# Patient Record
Sex: FEMALE | Race: WHITE | NOT HISPANIC OR LATINO | Employment: UNEMPLOYED | URBAN - NONMETROPOLITAN AREA
[De-identification: names, ages, dates, MRNs, and addresses within clinical notes are randomized per-mention and may not be internally consistent; named-entity substitution may affect disease eponyms.]

---

## 2017-01-04 ENCOUNTER — OFFICE VISIT (OUTPATIENT)
Dept: PODIATRY | Facility: CLINIC | Age: 32
End: 2017-01-04

## 2017-01-04 VITALS — HEIGHT: 63 IN | BODY MASS INDEX: 40.57 KG/M2 | WEIGHT: 229 LBS

## 2017-01-04 DIAGNOSIS — M76.61 ACHILLES TENDINITIS OF BOTH LOWER EXTREMITIES: ICD-10-CM

## 2017-01-04 DIAGNOSIS — G57.51 TARSAL TUNNEL SYNDROME, RIGHT LOWER LIMB: Primary | ICD-10-CM

## 2017-01-04 DIAGNOSIS — M76.62 ACHILLES TENDINITIS OF BOTH LOWER EXTREMITIES: ICD-10-CM

## 2017-01-04 PROCEDURE — 99213 OFFICE O/P EST LOW 20 MIN: CPT | Performed by: PODIATRIST

## 2017-01-04 NOTE — PROGRESS NOTES
Veronica Hicks  1985  31 y.o. female     Patient is here for a recheck of her right achilles tendinitis.      1/4/2017  Chief Complaint   Patient presents with   • Right Foot - Follow-up, achilles tendinitis           History of Present Illness    Patient returns to clinic for follow-up of her bilateral Achilles tendinitis.  She has been ambulating in a tall cam walker. States that she recently had a fall and now has to see an orthopedic surgeon for her shoulder.  Relates that the pain is better today.  However, she states that she has had transient numbness and swelling with Ecchymosis in her right foot and ankle.        Past Medical History   Diagnosis Date   • Abdominal pain    • Acute bronchitis    • Acute left otitis media    • Ankle pain    • Asthma    • Attention deficit hyperactivity disorder    • Bipolar disorder    • Candidiasis    • Candidiasis of vagina    • Diarrhea    • Dizziness    • Dysuria    • Elbow joint pain      elbow joint - painful on movement   • Elbow joint pain    • Elevated blood pressure    • Encounter for long-term (current) use of medications      Long-term (current) use of other medications    • Epigastric pain    • Excessive thirst    • Fall    • Fatigue    • Feeling tired      tired all the time   • Flank pain    • Gastroesophageal reflux disease    • High risk sexual behavior    • History of malignant neoplasm of cervix    • Hordeolum internum right upper eyelid    • Hypokalemia    • Increased frequency of urination    • Infection of skin and subcutaneous tissue    • Irritable bowel syndrome    • Knee pain, left    • Low back pain    • Muscle weakness    • Nausea and vomiting    • Pain in limb    • Pain in wrist    • Postartificial menopausal syndrome    • Postmenopausal state    • Serous otitis media    • Skin lesion    • Smokes tobacco daily    • Upper respiratory infection          Past Surgical History   Procedure Laterality Date   • Appendectomy     • Injection of  medication  04/17/2015     depo medrol 80mg   • Leg surgery     • Oophorectomy  07/22/2015     bilatral, for pain and recurrent ovarian cysts   • Pap smear  09/2012   • Total abdominal hysterectomy       ovaries retained   • Tubal abdominal ligation     • Orif ulna/radius fractures       treat radius/ulna fracutre-2; no pins, fracture L fa         Family History   Problem Relation Age of Onset   • ADD / ADHD Daughter    • Lung cancer Maternal Grandmother    • Cancer Paternal Grandmother    • Diabetes Other    • Liver cancer Other    • Breast cancer Neg Hx    • Colon cancer Neg Hx    • Endometrial cancer Neg Hx    • Ovarian cancer Neg Hx          Social History     Social History   • Marital status: Single     Spouse name: N/A   • Number of children: N/A   • Years of education: N/A     Occupational History   • disabled      Social History Main Topics   • Smoking status: Former Smoker     Types: Electronic Cigarette   • Smokeless tobacco: Never Used      Comment: Pt states she vapes   • Alcohol use No   • Drug use: No   • Sexual activity: Not on file     Other Topics Concern   • Not on file     Social History Narrative         Current Outpatient Prescriptions   Medication Sig Dispense Refill   • amitriptyline (ELAVIL) 25 MG tablet Take one tablet by mouth every other night as needed for sleep. 15 tablet 2   • Ergocalciferol 400 UNITS tablet Take 400 Int'l Units by mouth daily. 30 tablet 2   • glucose blood test strip To test blood sugars once daily 50 each 12   • glucose monitor monitoring kit To test blood sugars once daily 1 each 0   • MethylPREDNISolone (MEDROL) 4 MG tablet Take  by mouth. Take as directed on package instructions.     • MethylPREDNISolone (MEDROL, SARITA,) 4 MG tablet Take as directed on package instructions. 21 tablet 0   • naproxen sodium (ANAPROX) 275 MG tablet Take one tablet by mouth twice daily with food as needed for pain. 60 tablet 0   • neomycin-polymyxin-hydrocortisone (CORTISPORIN)  "3.5-95718-9 ophthalmic suspension Administer 1-2 drops to the right eye Every 4 (Four) Hours While Awake.     • Omega-3 Fatty Acids (FISH OIL) 1000 MG capsule capsule Take 1 capsule by mouth daily with breakfast. 30 capsule 0   • simvastatin (ZOCOR) 20 MG tablet Take 1 tablet by mouth every night. 30 tablet 5   • venlafaxine XR (EFFEXOR XR) 37.5 MG 24 hr capsule Take one capsule by mouth daily for one week and then two capsules by mouth daily. 50 capsule 0   • venlafaxine XR (EFFEXOR-XR) 75 MG 24 hr capsule Take 75 mg by mouth Daily.  0   • clindamycin (CLEOCIN) 300 MG capsule Take 300 mg by mouth 2 (Two) Times a Day.       No current facility-administered medications for this visit.          OBJECTIVE    Visit Vitals   • Ht 62.5\" (158.8 cm)   • Wt 229 lb (104 kg)   • BMI 41.22 kg/m2     Review of Systems   Constitutional: Negative for chills and fever.   Cardiovascular: Negative for chest pain.   Gastrointestinal: Negative for constipation, diarrhea, nausea and vomiting.   Skin: Negative for wound.   Musculoskeletal: bilateral ankle pain      Constitutional: well developed, well nourished    Respiratory: Non labored respirations noted    Cardiovascular:    DP/PT pulses palpable    CFT brisk  to all digits  Skin temp is warm to warm from proximal tibia to distal digits  Pedal hair growth present.   No erythema   Edema noted to bilateral lower extremities.    Musculoskeletal:  Muscle strength is 5/5 for all muscle groups tested   ROM of the 1st MTP is full without pain or crepitus  ROM of the MTJ is full without pain or crepitus    ROM of the STJ is full without pain or crepitus    ROM of the ankle joint is limited with  Pain without crepitus    Pain on palpation to the distal Achilles tendon insertion bilateral.  Rectus foot type     Dermatological:   Nails 1-5 are within normal limits for length and thickness    Skin is warm, dry and intact    Webspaces 1-4 bilateral are clean, dry and intact.   No subcutaneous " nodules or masses noted    No open wounds noted     Neurological:   Protective sensation intact    Sensation intact to light touch  Positive Tinel sign with percussion of the tibial nerve of the right lower extremity.    DTR intact    Psychiatric: A&O x 3 with normal mood and affect. NAD              Procedures        ASSESSMENT AND PLAN    Veronica was seen today for follow-up and achilles tendinitis.    Diagnoses and all orders for this visit:    Tarsal tunnel syndrome, right lower limb  -     MRI ankle right w wo contrast; Future    Achilles tendinitis of both lower extremities        Ordered MRI to evaluate for mass in the tarsal tunnel  Continue weightbearing as tolerated in the Cam Walker.  Patient is in agreement with the current treatment plan. All her questions were answered to her satisfaction.  Return to clinic after MRI.    Jordan Houston DPM  1/5/2017  4:02 PM            This document has been electronically signed by Jordan Houston DPM on January 5, 2017 4:02 PM

## 2017-01-04 NOTE — MR AVS SNAPSHOT
Veronica Hicks   1/4/2017 2:00 PM   Office Visit    Dept Phone:  [phone]   Encounter #:  [id number]    Provider:  Jordan Houston DPM   Department:  [facility] PODIATRY                Your Full Care Plan              Your Updated Medication List          This list is accurate as of: 1/4/17  2:24 PM.  Always use your most recent med list.                amitriptyline 25 MG tablet   Commonly known as:  ELAVIL   Take one tablet by mouth every other night as needed for sleep.       clindamycin 300 MG capsule   Commonly known as:  CLEOCIN       Ergocalciferol 400 UNITS tablet   Take 400 Int'l Units by mouth daily.       fish oil 1000 MG capsule capsule   Take 1 capsule by mouth daily with breakfast.       glucose blood test strip   To test blood sugars once daily       glucose monitor monitoring kit   To test blood sugars once daily       * MethylPREDNISolone 4 MG tablet   Commonly known as:  MEDROL (SARITA)   Take as directed on package instructions.       * MEDROL 4 MG tablet   Generic drug:  MethylPREDNISolone       naproxen sodium 275 MG tablet   Commonly known as:  ANAPROX   Take one tablet by mouth twice daily with food as needed for pain.       neomycin-polymyxin-hydrocortisone 3.5-87075-4 ophthalmic suspension   Commonly known as:  CORTISPORIN       simvastatin 20 MG tablet   Commonly known as:  ZOCOR   Take 1 tablet by mouth every night.       * venlafaxine XR 37.5 MG 24 hr capsule   Commonly known as:  EFFEXOR XR   Take one capsule by mouth daily for one week and then two capsules by mouth daily.       * venlafaxine XR 75 MG 24 hr capsule   Commonly known as:  EFFEXOR-XR       * Notice:  This list has 4 medication(s) that are the same as other medications prescribed for you. Read the directions carefully, and ask your doctor or other care provider to review them with you.            You Were Diagnosed With        Codes Comments    Tarsal tunnel syndrome, right lower  "limb    -  Primary ICD-10-CM: G57.51  ICD-9-CM: 355.5       Instructions     None    Patient Instructions History      Upcoming Appointments     Visit Type Date Time Department    FOLLOW UP 1/4/2017  2:00 PM [facility]    FOLLOW UP 1/16/2017  4:00 PM [facility]      MyChart Signup     Our records indicate that you have declined [facility]. If you would like to sign up for [employer], please email [email or web address] or call [phone] to obtain an activation code.             Other Info from Your Visit           Your Appointments     Jan 16, 2017  4:00 PM CST   Follow Up with Jordan Houston DPM   [facility] PODIATRY (--)    [address]  [facility] [place] [address] [place] [id number] [phone]           Arrive 15 minutes prior to appointment.              Allergies     Bactrim [Sulfamethoxazole-trimethoprim]      Codeine      Erythromycin      Gabapentin      Keflex [Cephalexin]      Penicillins      Zoloft [Sertraline Hcl]        Reason for Visit     Right Foot - Follow-up, achilles tendinitis           Vital Signs     Height Weight Body Mass Index Smoking Status          62.5\" (158.8 cm) 229 lb (104 kg) 41.22 kg/m2 Former Smoker        Problems and Diagnoses Noted     Tarsal tunnel syndrome    -  Primary        "

## 2017-02-03 ENCOUNTER — OFFICE VISIT (OUTPATIENT)
Dept: OBSTETRICS AND GYNECOLOGY | Facility: CLINIC | Age: 32
End: 2017-02-03

## 2017-02-03 VITALS
RESPIRATION RATE: 18 BRPM | DIASTOLIC BLOOD PRESSURE: 90 MMHG | WEIGHT: 228.2 LBS | HEART RATE: 87 BPM | SYSTOLIC BLOOD PRESSURE: 130 MMHG | HEIGHT: 63 IN | BODY MASS INDEX: 40.43 KG/M2

## 2017-02-03 DIAGNOSIS — Z11.3 ENCOUNTER FOR SPECIAL SCREENING EXAMINATION FOR INFECTION WITH PREDOMINANTLY SEXUAL MODE OF TRANSMISSION: ICD-10-CM

## 2017-02-03 DIAGNOSIS — A59.01 TRICHOMONAL VAGINITIS: Primary | ICD-10-CM

## 2017-02-03 PROCEDURE — 87210 SMEAR WET MOUNT SALINE/INK: CPT | Performed by: NURSE PRACTITIONER

## 2017-02-03 PROCEDURE — 99213 OFFICE O/P EST LOW 20 MIN: CPT | Performed by: NURSE PRACTITIONER

## 2017-02-03 PROCEDURE — 87800 DETECT AGNT MULT DNA DIREC: CPT | Performed by: NURSE PRACTITIONER

## 2017-02-03 RX ORDER — LANCETS 33 GAUGE
EACH MISCELLANEOUS
Refills: 12 | COMMUNITY
Start: 2016-11-15 | End: 2017-11-02 | Stop reason: SDUPTHER

## 2017-02-03 RX ORDER — BLOOD-GLUCOSE METER
KIT MISCELLANEOUS
Refills: 0 | COMMUNITY
Start: 2016-11-15

## 2017-02-03 RX ORDER — METRONIDAZOLE 500 MG/1
500 TABLET ORAL 2 TIMES DAILY
Qty: 14 TABLET | Refills: 0 | Status: SHIPPED | OUTPATIENT
Start: 2017-02-03 | End: 2017-02-10

## 2017-02-03 NOTE — PROGRESS NOTES
"Subjective   Veronica Hicks is a 31 y.o. female.     History of Present Illness   Pt presents complaining of vaginal itching, pain, swelling and large amount of discharge with foul odor. States symptoms started approximately 2 weeks ago. Denies intercourse in 3 weeks. Agrees to G/C testing. States she has only had 1 partner in 5 years but showed up positive for STD from a toilet seat before.     Pt has had a complete hysterectomy after abnormal pap smear and cervical cancer. States she went through menopause before her 20's. Had tubal first to \"keep her children from getting HPV.\"     The following portions of the patient's history were reviewed and updated as appropriate: allergies, current medications, past family history, past medical history, past social history, past surgical history and problem list.    Review of Systems   Constitutional: Negative.    Respiratory: Negative.    Cardiovascular: Negative.    Genitourinary: Positive for dysuria, pelvic pain, vaginal discharge and vaginal pain. Negative for genital sores and vaginal bleeding.   Psychiatric/Behavioral:        Bipolar disorder, very anxious about exam       Objective   Physical Exam   Constitutional: She is oriented to person, place, and time. She appears well-developed and well-nourished.   Cardiovascular: Normal rate, regular rhythm and normal heart sounds.    Pulmonary/Chest: Effort normal and breath sounds normal.   Genitourinary: There is tenderness on the right labia. There is no rash, lesion or injury on the right labia. There is tenderness on the left labia. There is no rash, lesion or injury on the left labia. There is erythema and tenderness in the vagina. No bleeding in the vagina. No foreign body in the vagina. No signs of injury around the vagina. Vaginal discharge (copious, foul smelling yellow green frothy discharge, wet prep obtained G/C swab obtained) found.   Genitourinary Comments: Erythema of the entire vulva. S/P hysterectomy " w/BSO. Wet prep results: positive for trichomonads TNTC, WBC TNTC, some clue cells, negative for yeast buds or hyphae. Evaluated by DC Knutson    Neurological: She is alert and oriented to person, place, and time.   Psychiatric: Her mood appears anxious. Her speech is rapid and/or pressured. She is hyperactive.   Vitals reviewed.      Assessment/Plan   Veronica was seen today for vaginitis.    Diagnoses and all orders for this visit:    Trichomonal vaginitis  -     metroNIDAZOLE (FLAGYL) 500 MG tablet; Take 1 tablet by mouth 2 (Two) Times a Day for 7 days. No alcohol up to 48 hours after last dose    Encounter for special screening examination for infection with predominantly sexual mode of transmission  -     C Trachomatis / N Gonorrhoeae PCR         I discussed findings of trichomoniasis with pt x 10 minutes. Pt is insistent that she did not get this infection through sexual contact as she states neither she nor her partner have been with anyone else in many years. She is persistent that she got this from a toilet seat despite my educational information. I printed patient info from [employer] for pt. She told partner while in my office via text message and he was going to go to Urgent Care to receive treatment. I will call with G/C results and prescribe prn.

## 2017-02-05 LAB
C TRACH RRNA CVX QL NAA+PROBE: NOT DETECTED
N GONORRHOEA RRNA SPEC QL NAA+PROBE: NOT DETECTED

## 2017-05-11 ENCOUNTER — OFFICE VISIT (OUTPATIENT)
Dept: ORTHOPEDIC SURGERY | Facility: CLINIC | Age: 32
End: 2017-05-11

## 2017-05-11 VITALS — WEIGHT: 225 LBS | HEIGHT: 62 IN | BODY MASS INDEX: 41.41 KG/M2

## 2017-05-11 DIAGNOSIS — G89.29 CHRONIC PAIN OF BOTH KNEES: Primary | ICD-10-CM

## 2017-05-11 DIAGNOSIS — Z00.00 EVALUATION BY MEDICAL SERVICE REQUIRED: ICD-10-CM

## 2017-05-11 DIAGNOSIS — M25.561 CHRONIC PAIN OF BOTH KNEES: Primary | ICD-10-CM

## 2017-05-11 DIAGNOSIS — M25.562 CHRONIC PAIN OF BOTH KNEES: Primary | ICD-10-CM

## 2017-05-11 DIAGNOSIS — M22.2X2 PATELLOFEMORAL STRESS SYNDROME OF BOTH KNEES: ICD-10-CM

## 2017-05-11 DIAGNOSIS — M22.2X1 PATELLOFEMORAL STRESS SYNDROME OF BOTH KNEES: ICD-10-CM

## 2017-05-11 PROCEDURE — 99214 OFFICE O/P EST MOD 30 MIN: CPT | Performed by: ORTHOPAEDIC SURGERY

## 2017-05-18 DIAGNOSIS — Z00.00 EXAMINATION, MEDICAL, GENERAL: Primary | ICD-10-CM

## 2017-07-05 PROBLEM — M25.571 ACUTE RIGHT ANKLE PAIN: Status: ACTIVE | Noted: 2017-07-05

## 2017-10-02 PROBLEM — M79.671 PAIN OF RIGHT HEEL: Status: ACTIVE | Noted: 2017-10-02

## 2017-10-02 PROBLEM — M79.10 MUSCLE SORENESS: Status: ACTIVE | Noted: 2017-10-02

## 2017-10-02 PROBLEM — M54.50 LOW BACK PAIN: Status: ACTIVE | Noted: 2017-10-02

## 2017-10-02 PROBLEM — S29.011A CHEST WALL MUSCLE STRAIN: Status: ACTIVE | Noted: 2017-10-02

## 2017-11-02 ENCOUNTER — OFFICE VISIT (OUTPATIENT)
Dept: FAMILY MEDICINE CLINIC | Facility: CLINIC | Age: 32
End: 2017-11-02

## 2017-11-02 VITALS
WEIGHT: 236 LBS | BODY MASS INDEX: 43.43 KG/M2 | OXYGEN SATURATION: 99 % | HEART RATE: 72 BPM | DIASTOLIC BLOOD PRESSURE: 74 MMHG | TEMPERATURE: 98.9 F | HEIGHT: 62 IN | SYSTOLIC BLOOD PRESSURE: 110 MMHG | RESPIRATION RATE: 18 BRPM

## 2017-11-02 DIAGNOSIS — R07.9 CHEST PAIN, UNSPECIFIED TYPE: ICD-10-CM

## 2017-11-02 DIAGNOSIS — Z09 HOSPITAL DISCHARGE FOLLOW-UP: Primary | ICD-10-CM

## 2017-11-02 DIAGNOSIS — J45.40 MODERATE PERSISTENT ASTHMA, UNSPECIFIED WHETHER COMPLICATED: ICD-10-CM

## 2017-11-02 DIAGNOSIS — R50.9 FEVER, UNSPECIFIED FEVER CAUSE: ICD-10-CM

## 2017-11-02 DIAGNOSIS — J40 BRONCHITIS: ICD-10-CM

## 2017-11-02 DIAGNOSIS — R09.1 PLEURISY: ICD-10-CM

## 2017-11-02 DIAGNOSIS — I73.9 INTERMITTENT CLAUDICATION (HCC): ICD-10-CM

## 2017-11-02 DIAGNOSIS — T36.95XA ANTIBIOTIC-INDUCED YEAST INFECTION: ICD-10-CM

## 2017-11-02 DIAGNOSIS — IMO0002 UNCONTROLLED TYPE 2 DIABETES MELLITUS WITH COMPLICATION, WITHOUT LONG-TERM CURRENT USE OF INSULIN: ICD-10-CM

## 2017-11-02 DIAGNOSIS — B37.9 ANTIBIOTIC-INDUCED YEAST INFECTION: ICD-10-CM

## 2017-11-02 PROCEDURE — 99214 OFFICE O/P EST MOD 30 MIN: CPT | Performed by: NURSE PRACTITIONER

## 2017-11-02 PROCEDURE — 96372 THER/PROPH/DIAG INJ SC/IM: CPT | Performed by: NURSE PRACTITIONER

## 2017-11-02 RX ORDER — FLUOXETINE 10 MG/1
CAPSULE ORAL
COMMUNITY
Start: 2017-10-19 | End: 2017-11-02 | Stop reason: SDUPTHER

## 2017-11-02 RX ORDER — FLUOXETINE 10 MG/1
TABLET, FILM COATED ORAL
COMMUNITY
End: 2017-11-17 | Stop reason: ALTCHOICE

## 2017-11-02 RX ORDER — BETAMETHASONE SODIUM PHOSPHATE AND BETAMETHASONE ACETATE 3; 3 MG/ML; MG/ML
6 INJECTION, SUSPENSION INTRA-ARTICULAR; INTRALESIONAL; INTRAMUSCULAR; SOFT TISSUE ONCE
Status: COMPLETED | OUTPATIENT
Start: 2017-11-02 | End: 2017-11-02

## 2017-11-02 RX ORDER — LEVOFLOXACIN 500 MG/1
500 TABLET, FILM COATED ORAL DAILY
Qty: 7 TABLET | Refills: 0 | Status: SHIPPED | OUTPATIENT
Start: 2017-11-02 | End: 2017-11-14

## 2017-11-02 RX ORDER — TRAZODONE HYDROCHLORIDE 50 MG/1
TABLET ORAL
COMMUNITY
End: 2018-01-03 | Stop reason: ALTCHOICE

## 2017-11-02 RX ORDER — LANCETS 33 GAUGE
1 EACH MISCELLANEOUS DAILY
Qty: 50 EACH | Refills: 11 | Status: SHIPPED | OUTPATIENT
Start: 2017-11-02 | End: 2019-03-18

## 2017-11-02 RX ORDER — ALBUTEROL SULFATE 90 UG/1
2 AEROSOL, METERED RESPIRATORY (INHALATION) EVERY 6 HOURS PRN
Qty: 1 INHALER | Refills: 3 | Status: SHIPPED | OUTPATIENT
Start: 2017-11-02 | End: 2018-08-28 | Stop reason: SDUPTHER

## 2017-11-02 RX ORDER — METHYLPREDNISOLONE 4 MG/1
TABLET ORAL
COMMUNITY
Start: 2017-10-31 | End: 2017-11-02

## 2017-11-02 RX ORDER — NAPROXEN 500 MG/1
TABLET ORAL
COMMUNITY
Start: 2017-10-31 | End: 2017-11-14

## 2017-11-02 RX ORDER — ALBUTEROL SULFATE 90 UG/1
AEROSOL, METERED RESPIRATORY (INHALATION)
COMMUNITY
End: 2017-11-02

## 2017-11-02 RX ORDER — BLOOD-GLUCOSE METER
KIT MISCELLANEOUS
Qty: 1 EACH | Refills: 0 | Status: SHIPPED | OUTPATIENT
Start: 2017-11-02 | End: 2019-09-16

## 2017-11-02 RX ORDER — FLUCONAZOLE 150 MG/1
TABLET ORAL
Qty: 2 TABLET | Refills: 0 | Status: SHIPPED | OUTPATIENT
Start: 2017-11-02 | End: 2017-11-14

## 2017-11-02 RX ADMIN — BETAMETHASONE SODIUM PHOSPHATE AND BETAMETHASONE ACETATE 6 MG: 3; 3 INJECTION, SUSPENSION INTRA-ARTICULAR; INTRALESIONAL; INTRAMUSCULAR; SOFT TISSUE at 11:19
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
## 2017-11-14 ENCOUNTER — OFFICE VISIT (OUTPATIENT)
Dept: GASTROENTEROLOGY | Facility: CLINIC | Age: 32
End: 2017-11-14

## 2017-11-14 VITALS
DIASTOLIC BLOOD PRESSURE: 72 MMHG | WEIGHT: 226 LBS | BODY MASS INDEX: 41.59 KG/M2 | HEIGHT: 62 IN | SYSTOLIC BLOOD PRESSURE: 118 MMHG | HEART RATE: 97 BPM

## 2017-11-14 DIAGNOSIS — K21.00 GASTROESOPHAGEAL REFLUX DISEASE WITH ESOPHAGITIS: Primary | ICD-10-CM

## 2017-11-14 DIAGNOSIS — R11.2 NON-INTRACTABLE VOMITING WITH NAUSEA, UNSPECIFIED VOMITING TYPE: ICD-10-CM

## 2017-11-14 DIAGNOSIS — R19.7 DIARRHEA, UNSPECIFIED TYPE: ICD-10-CM

## 2017-11-14 DIAGNOSIS — R10.84 GENERALIZED ABDOMINAL PAIN: ICD-10-CM

## 2017-11-14 LAB — ERYTHROCYTE [SEDIMENTATION RATE] IN BLOOD: 7 MM/HR (ref 0–20)

## 2017-11-14 PROCEDURE — 83516 IMMUNOASSAY NONANTIBODY: CPT | Performed by: PHYSICIAN ASSISTANT

## 2017-11-14 PROCEDURE — 86140 C-REACTIVE PROTEIN: CPT | Performed by: PHYSICIAN ASSISTANT

## 2017-11-14 PROCEDURE — 86003 ALLG SPEC IGE CRUDE XTRC EA: CPT | Performed by: PHYSICIAN ASSISTANT

## 2017-11-14 PROCEDURE — 99214 OFFICE O/P EST MOD 30 MIN: CPT | Performed by: PHYSICIAN ASSISTANT

## 2017-11-14 PROCEDURE — 86256 FLUORESCENT ANTIBODY TITER: CPT | Performed by: PHYSICIAN ASSISTANT

## 2017-11-14 PROCEDURE — 85651 RBC SED RATE NONAUTOMATED: CPT | Performed by: PHYSICIAN ASSISTANT

## 2017-11-14 RX ORDER — POLYETHYLENE GLYCOL 3350, SODIUM CHLORIDE, SODIUM BICARBONATE, POTASSIUM CHLORIDE 420; 11.2; 5.72; 1.48 G/4L; G/4L; G/4L; G/4L
4000 POWDER, FOR SOLUTION ORAL ONCE
Qty: 4000 ML | Refills: 0 | Status: SHIPPED | OUTPATIENT
Start: 2017-11-14 | End: 2017-11-14 | Stop reason: SDDI

## 2017-11-14 RX ORDER — DEXTROSE AND SODIUM CHLORIDE 5; .45 G/100ML; G/100ML
30 INJECTION, SOLUTION INTRAVENOUS CONTINUOUS PRN
Status: CANCELLED | OUTPATIENT
Start: 2018-01-10

## 2017-11-14 RX ORDER — POLYETHYLENE GLYCOL 3350 17 G/17G
POWDER, FOR SOLUTION ORAL
Qty: 255 G | Refills: 0 | Status: SHIPPED | OUTPATIENT
Start: 2017-11-14 | End: 2018-01-17

## 2017-11-15 ENCOUNTER — TELEPHONE (OUTPATIENT)
Dept: FAMILY MEDICINE CLINIC | Facility: CLINIC | Age: 32
End: 2017-11-15

## 2017-11-15 DIAGNOSIS — Z13.220 SCREENING FOR HYPERLIPIDEMIA: ICD-10-CM

## 2017-11-15 DIAGNOSIS — Z13.29 SCREENING FOR THYROID DISORDER: ICD-10-CM

## 2017-11-15 DIAGNOSIS — Z13.1 SCREENING FOR DIABETES MELLITUS: ICD-10-CM

## 2017-11-15 DIAGNOSIS — Z13.0 SCREENING FOR DEFICIENCY ANEMIA: ICD-10-CM

## 2017-11-15 DIAGNOSIS — R79.1 PROLONGED BLEEDING TIME: Primary | ICD-10-CM

## 2017-11-15 LAB — CRP SERPL-MCNC: 0.6 MG/DL (ref 0–1)

## 2017-11-15 NOTE — TELEPHONE ENCOUNTER
Just left message for pt to call back need her to fast and get bloodwork done before appt on Friday

## 2017-11-15 NOTE — TELEPHONE ENCOUNTER
Pt called back and spoke about her coming in for blood work needs to be fasting and she was talking about a lot of other things as well.    She was talking about her blood being thin and when she checks her blood sugar it bleeds for over a minute and soaks 2 kleenex so I will ask Valentina to add something to test that pt states that her blood is so thin she cant be on blood thinners.

## 2017-11-16 LAB
ALBUMIN SERPL-MCNC: 4.3 G/DL (ref 3.2–5.5)
ALBUMIN/GLOB SERPL: 1.2 G/DL (ref 1–3)
ALP SERPL-CCNC: 70 U/L (ref 15–121)
ALT SERPL W P-5'-P-CCNC: 30 U/L (ref 10–60)
ANION GAP SERPL CALCULATED.3IONS-SCNC: 11 MMOL/L (ref 5–15)
AST SERPL-CCNC: 22 U/L (ref 10–60)
BASOPHILS # BLD AUTO: 0.03 10*3/MM3 (ref 0–0.2)
BASOPHILS NFR BLD AUTO: 0.3 % (ref 0–2)
BILIRUB SERPL-MCNC: 0.8 MG/DL (ref 0.2–1)
BUN BLD-MCNC: 14 MG/DL (ref 8–25)
BUN/CREAT SERPL: 15.6 (ref 7–25)
CALCIUM SPEC-SCNC: 9.6 MG/DL (ref 8.4–10.8)
CHLORIDE SERPL-SCNC: 103 MMOL/L (ref 100–112)
CHOLEST SERPL-MCNC: 211 MG/DL (ref 150–200)
CO2 SERPL-SCNC: 27 MMOL/L (ref 20–32)
CREAT BLD-MCNC: 0.9 MG/DL (ref 0.4–1.3)
DEPRECATED RDW RBC AUTO: 42.9 FL (ref 36.4–46.3)
EOSINOPHIL # BLD AUTO: 0.24 10*3/MM3 (ref 0–0.7)
EOSINOPHIL NFR BLD AUTO: 2.3 % (ref 0–7)
ERYTHROCYTE [DISTWIDTH] IN BLOOD BY AUTOMATED COUNT: 14.4 % (ref 11.5–14.5)
GFR SERPL CREATININE-BSD FRML MDRD: 74 ML/MIN/1.73 (ref 71–165)
GLIADIN PEPTIDE IGA SER-ACNC: 2 UNITS (ref 0–19)
GLIADIN PEPTIDE IGG SER-ACNC: 2 UNITS (ref 0–19)
GLOBULIN UR ELPH-MCNC: 3.5 GM/DL (ref 2.5–4.6)
GLUCOSE BLD-MCNC: 89 MG/DL (ref 70–100)
HCT VFR BLD AUTO: 46.2 % (ref 35–45)
HDLC SERPL-MCNC: 52 MG/DL (ref 35–100)
HGB BLD-MCNC: 14.7 G/DL (ref 12–15.5)
INR PPP: 1 (ref 0.8–1.2)
LDLC SERPL CALC-MCNC: 134 MG/DL
LDLC/HDLC SERPL: 2.58 {RATIO}
LYMPHOCYTES # BLD AUTO: 2.39 10*3/MM3 (ref 0.6–4.2)
LYMPHOCYTES NFR BLD AUTO: 22.8 % (ref 10–50)
MCH RBC QN AUTO: 26.2 PG (ref 26.5–34)
MCHC RBC AUTO-ENTMCNC: 31.8 G/DL (ref 31.4–36)
MCV RBC AUTO: 82.2 FL (ref 80–98)
MONOCYTES # BLD AUTO: 0.53 10*3/MM3 (ref 0–0.9)
MONOCYTES NFR BLD AUTO: 5.1 % (ref 0–12)
NEUTROPHILS # BLD AUTO: 7.27 10*3/MM3 (ref 2–8.6)
NEUTROPHILS NFR BLD AUTO: 69.5 % (ref 37–80)
PLATELET # BLD AUTO: 266 10*3/MM3 (ref 150–450)
PMV BLD AUTO: 9 FL (ref 8–12)
POTASSIUM BLD-SCNC: 4.2 MMOL/L (ref 3.4–5.4)
PROT SERPL-MCNC: 7.8 G/DL (ref 6.7–8.2)
PROTHROMBIN TIME: 13.1 SECONDS (ref 11.1–15.3)
RBC # BLD AUTO: 5.62 10*6/MM3 (ref 3.77–5.16)
SODIUM BLD-SCNC: 141 MMOL/L (ref 134–146)
TRIGL SERPL-MCNC: 125 MG/DL (ref 35–160)
TTG IGA SER-ACNC: <2 U/ML (ref 0–3)
TTG IGG SER-ACNC: <2 U/ML (ref 0–5)
VLDLC SERPL-MCNC: 25 MG/DL
WBC NRBC COR # BLD: 10.46 10*3/MM3 (ref 3.2–9.8)

## 2017-11-16 PROCEDURE — 85610 PROTHROMBIN TIME: CPT | Performed by: NURSE PRACTITIONER

## 2017-11-16 PROCEDURE — 80053 COMPREHEN METABOLIC PANEL: CPT | Performed by: NURSE PRACTITIONER

## 2017-11-16 PROCEDURE — 84439 ASSAY OF FREE THYROXINE: CPT | Performed by: NURSE PRACTITIONER

## 2017-11-16 PROCEDURE — 85025 COMPLETE CBC W/AUTO DIFF WBC: CPT | Performed by: NURSE PRACTITIONER

## 2017-11-16 PROCEDURE — 80061 LIPID PANEL: CPT | Performed by: NURSE PRACTITIONER

## 2017-11-16 PROCEDURE — 84443 ASSAY THYROID STIM HORMONE: CPT | Performed by: NURSE PRACTITIONER

## 2017-11-17 ENCOUNTER — OFFICE VISIT (OUTPATIENT)
Dept: FAMILY MEDICINE CLINIC | Facility: CLINIC | Age: 32
End: 2017-11-17

## 2017-11-17 VITALS
HEIGHT: 63 IN | BODY MASS INDEX: 40.04 KG/M2 | HEART RATE: 102 BPM | SYSTOLIC BLOOD PRESSURE: 126 MMHG | OXYGEN SATURATION: 99 % | DIASTOLIC BLOOD PRESSURE: 82 MMHG | RESPIRATION RATE: 18 BRPM | WEIGHT: 226 LBS | TEMPERATURE: 99.3 F

## 2017-11-17 DIAGNOSIS — B37.9 ANTIBIOTIC-INDUCED YEAST INFECTION: ICD-10-CM

## 2017-11-17 DIAGNOSIS — G89.29 CHRONIC PAIN OF BOTH KNEES: ICD-10-CM

## 2017-11-17 DIAGNOSIS — M25.561 CHRONIC PAIN OF BOTH KNEES: ICD-10-CM

## 2017-11-17 DIAGNOSIS — J01.01 ACUTE RECURRENT MAXILLARY SINUSITIS: ICD-10-CM

## 2017-11-17 DIAGNOSIS — I73.9 PAD (PERIPHERAL ARTERY DISEASE) (HCC): Primary | ICD-10-CM

## 2017-11-17 DIAGNOSIS — E78.5 HYPERLIPIDEMIA, UNSPECIFIED HYPERLIPIDEMIA TYPE: ICD-10-CM

## 2017-11-17 DIAGNOSIS — F41.9 ANXIETY: ICD-10-CM

## 2017-11-17 DIAGNOSIS — IMO0002 UNCONTROLLED TYPE 2 DIABETES MELLITUS WITH COMPLICATION, WITHOUT LONG-TERM CURRENT USE OF INSULIN: ICD-10-CM

## 2017-11-17 DIAGNOSIS — M79.2 PERIPHERAL NEUROPATHIC PAIN: ICD-10-CM

## 2017-11-17 DIAGNOSIS — M25.562 CHRONIC PAIN OF BOTH KNEES: ICD-10-CM

## 2017-11-17 DIAGNOSIS — T36.95XA ANTIBIOTIC-INDUCED YEAST INFECTION: ICD-10-CM

## 2017-11-17 DIAGNOSIS — G25.81 RESTLESS LEG SYNDROME: ICD-10-CM

## 2017-11-17 DIAGNOSIS — J45.20 MILD INTERMITTENT ASTHMA WITHOUT COMPLICATION: ICD-10-CM

## 2017-11-17 LAB
BAKER'S YEAST IGG QN: <20 UNITS (ref 0–24.9)
P-ANCA ATYPICAL TITR SER IF: NORMAL TITER
SACCHAROMYCES CEREVISIAE AB IGA: <20 UNITS (ref 0–24.9)
T4 FREE SERPL-MCNC: 0.94 NG/DL (ref 0.78–2.19)
TSH SERPL DL<=0.05 MIU/L-ACNC: 1.26 MIU/ML (ref 0.46–4.68)

## 2017-11-17 PROCEDURE — 99214 OFFICE O/P EST MOD 30 MIN: CPT | Performed by: NURSE PRACTITIONER

## 2017-11-17 RX ORDER — DULOXETIN HYDROCHLORIDE 30 MG/1
30 CAPSULE, DELAYED RELEASE ORAL DAILY
Qty: 30 CAPSULE | Refills: 0 | Status: SHIPPED | OUTPATIENT
Start: 2017-11-17 | End: 2017-12-18 | Stop reason: SINTOL

## 2017-11-17 RX ORDER — FLUCONAZOLE 150 MG/1
TABLET ORAL
Qty: 2 TABLET | Refills: 0 | Status: SHIPPED | OUTPATIENT
Start: 2017-11-17 | End: 2018-01-03

## 2017-11-17 RX ORDER — ROPINIROLE 1 MG/1
1 TABLET, FILM COATED ORAL NIGHTLY
Qty: 30 TABLET | Refills: 0 | Status: SHIPPED | OUTPATIENT
Start: 2017-11-17 | End: 2018-01-03 | Stop reason: ALTCHOICE

## 2017-11-17 RX ORDER — LOVASTATIN 10 MG/1
10 TABLET ORAL NIGHTLY
Qty: 30 TABLET | Refills: 0 | Status: SHIPPED | OUTPATIENT
Start: 2017-11-17 | End: 2017-12-18 | Stop reason: SINTOL

## 2017-11-17 RX ORDER — CILOSTAZOL 100 MG/1
100 TABLET ORAL DAILY
Qty: 30 TABLET | Refills: 0 | Status: SHIPPED | OUTPATIENT
Start: 2017-11-17 | End: 2017-12-18 | Stop reason: SDUPTHER

## 2017-11-17 RX ORDER — LEVOFLOXACIN 500 MG/1
500 TABLET, FILM COATED ORAL DAILY
Qty: 5 TABLET | Refills: 0 | Status: SHIPPED | OUTPATIENT
Start: 2017-11-17 | End: 2017-11-22

## 2017-11-17 RX ORDER — ASPIRIN 81 MG/1
81 TABLET ORAL DAILY
Qty: 30 TABLET | Refills: 0 | Status: SHIPPED | OUTPATIENT
Start: 2017-11-17 | End: 2017-12-18 | Stop reason: SDUPTHER

## 2017-11-18 LAB
CALIF WALNUT POLN IGE QN: <0.1 KU/L
CODFISH IGE QN: <0.1 KU/L
CONV CLASS DESCRIPTION: ABNORMAL
COW MILK IGE QN: 0.14 KU/L
EGG WHITE IGE QN: <0.1 KU/L
GLUTEN IGE QN: <0.1 KU/L
HAZELNUT IGE QN: <0.1 KU/L
PEANUT IGE QN: 0.12 KU/L
SCALLOP IGE QN: 0.1 KU/L
SESAME SEED IGE: 0.11 KU/L
SHRIMP IGE: <0.1 KU/L
SOYBEAN IGE QN: <0.1 KU/L
WHEAT IGE QN: <0.1 KU/L
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
## 2017-12-18 ENCOUNTER — OFFICE VISIT (OUTPATIENT)
Dept: FAMILY MEDICINE CLINIC | Facility: CLINIC | Age: 32
End: 2017-12-18

## 2017-12-18 VITALS
SYSTOLIC BLOOD PRESSURE: 110 MMHG | HEIGHT: 63 IN | DIASTOLIC BLOOD PRESSURE: 80 MMHG | BODY MASS INDEX: 40.04 KG/M2 | TEMPERATURE: 98 F | HEART RATE: 93 BPM | WEIGHT: 226 LBS

## 2017-12-18 DIAGNOSIS — R06.02 SHORTNESS OF BREATH: ICD-10-CM

## 2017-12-18 DIAGNOSIS — R05.9 COUGH: ICD-10-CM

## 2017-12-18 DIAGNOSIS — E78.5 HYPERLIPIDEMIA, UNSPECIFIED HYPERLIPIDEMIA TYPE: ICD-10-CM

## 2017-12-18 DIAGNOSIS — F32.A DEPRESSION, UNSPECIFIED DEPRESSION TYPE: ICD-10-CM

## 2017-12-18 DIAGNOSIS — R73.09 ELEVATED RANDOM BLOOD GLUCOSE LEVEL: ICD-10-CM

## 2017-12-18 DIAGNOSIS — I73.9 PAD (PERIPHERAL ARTERY DISEASE) (HCC): Primary | ICD-10-CM

## 2017-12-18 DIAGNOSIS — R11.2 NAUSEA AND VOMITING, INTRACTABILITY OF VOMITING NOT SPECIFIED, UNSPECIFIED VOMITING TYPE: ICD-10-CM

## 2017-12-18 DIAGNOSIS — R50.9 FEVER, UNSPECIFIED FEVER CAUSE: ICD-10-CM

## 2017-12-18 DIAGNOSIS — M79.10 MUSCLE SORENESS: ICD-10-CM

## 2017-12-18 DIAGNOSIS — F41.1 GAD (GENERALIZED ANXIETY DISORDER): ICD-10-CM

## 2017-12-18 LAB
BASOPHILS # BLD AUTO: 0.05 10*3/MM3 (ref 0–0.2)
BASOPHILS NFR BLD AUTO: 0.3 % (ref 0–2)
DEPRECATED RDW RBC AUTO: 41.9 FL (ref 36.4–46.3)
EOSINOPHIL # BLD AUTO: 0.29 10*3/MM3 (ref 0–0.7)
EOSINOPHIL NFR BLD AUTO: 2 % (ref 0–7)
ERYTHROCYTE [DISTWIDTH] IN BLOOD BY AUTOMATED COUNT: 14.5 % (ref 11.5–14.5)
HCT VFR BLD AUTO: 44.7 % (ref 35–45)
HGB BLD-MCNC: 14.6 G/DL (ref 12–15.5)
LYMPHOCYTES # BLD AUTO: 3.76 10*3/MM3 (ref 0.6–4.2)
LYMPHOCYTES NFR BLD AUTO: 26 % (ref 10–50)
MCH RBC QN AUTO: 26.1 PG (ref 26.5–34)
MCHC RBC AUTO-ENTMCNC: 32.7 G/DL (ref 31.4–36)
MCV RBC AUTO: 80 FL (ref 80–98)
MONOCYTES # BLD AUTO: 0.94 10*3/MM3 (ref 0–0.9)
MONOCYTES NFR BLD AUTO: 6.5 % (ref 0–12)
NEUTROPHILS # BLD AUTO: 9.41 10*3/MM3 (ref 2–8.6)
NEUTROPHILS NFR BLD AUTO: 65.2 % (ref 37–80)
PLATELET # BLD AUTO: 317 10*3/MM3 (ref 150–450)
PMV BLD AUTO: 9.2 FL (ref 8–12)
RBC # BLD AUTO: 5.59 10*6/MM3 (ref 3.77–5.16)
WBC NRBC COR # BLD: 14.45 10*3/MM3 (ref 3.2–9.8)

## 2017-12-18 PROCEDURE — 99214 OFFICE O/P EST MOD 30 MIN: CPT | Performed by: NURSE PRACTITIONER

## 2017-12-18 PROCEDURE — 85025 COMPLETE CBC W/AUTO DIFF WBC: CPT | Performed by: NURSE PRACTITIONER

## 2017-12-18 PROCEDURE — 83036 HEMOGLOBIN GLYCOSYLATED A1C: CPT | Performed by: NURSE PRACTITIONER

## 2017-12-18 PROCEDURE — 96372 THER/PROPH/DIAG INJ SC/IM: CPT | Performed by: NURSE PRACTITIONER

## 2017-12-18 RX ORDER — CHLORAL HYDRATE 500 MG
1000 CAPSULE ORAL
Qty: 90 CAPSULE | Refills: 5 | Status: SHIPPED | OUTPATIENT
Start: 2017-12-18 | End: 2019-03-18 | Stop reason: SDUPTHER

## 2017-12-18 RX ORDER — FLUOXETINE HYDROCHLORIDE 20 MG/1
20 CAPSULE ORAL DAILY
Qty: 30 CAPSULE | Refills: 0 | Status: SHIPPED | OUTPATIENT
Start: 2017-12-18 | End: 2018-01-17

## 2017-12-18 RX ORDER — DEXTROMETHORPHAN HYDROBROMIDE AND PROMETHAZINE HYDROCHLORIDE 15; 6.25 MG/5ML; MG/5ML
5 SYRUP ORAL 4 TIMES DAILY PRN
Qty: 118 ML | Refills: 0 | Status: SHIPPED | OUTPATIENT
Start: 2017-12-18 | End: 2018-01-24

## 2017-12-18 RX ORDER — CILOSTAZOL 100 MG/1
100 TABLET ORAL DAILY
Qty: 30 TABLET | Refills: 5 | Status: SHIPPED | OUTPATIENT
Start: 2017-12-18 | End: 2018-08-28 | Stop reason: SDUPTHER

## 2017-12-18 RX ORDER — ASPIRIN 81 MG/1
81 TABLET ORAL DAILY
Qty: 30 TABLET | Refills: 5 | Status: SHIPPED | OUTPATIENT
Start: 2017-12-18 | End: 2019-02-22 | Stop reason: SDUPTHER

## 2017-12-19 LAB — HBA1C MFR BLD: 5.5 % (ref 4–5.6)

## 2017-12-19 RX ORDER — ONDANSETRON 2 MG/ML
4 INJECTION INTRAMUSCULAR; INTRAVENOUS ONCE
Status: COMPLETED | OUTPATIENT
Start: 2017-12-18 | End: 2017-12-19

## 2017-12-19 RX ORDER — ONDANSETRON 2 MG/ML
4 INJECTION INTRAMUSCULAR; INTRAVENOUS ONCE
Qty: 2 ML | Refills: 0
Start: 2017-12-19 | End: 2017-12-19 | Stop reason: SDUPTHER

## 2017-12-19 RX ORDER — METHYLPREDNISOLONE ACETATE 80 MG/ML
80 INJECTION, SUSPENSION INTRA-ARTICULAR; INTRALESIONAL; INTRAMUSCULAR; SOFT TISSUE ONCE
Status: COMPLETED | OUTPATIENT
Start: 2017-12-19 | End: 2017-12-19

## 2017-12-19 RX ADMIN — METHYLPREDNISOLONE ACETATE 80 MG: 80 INJECTION, SUSPENSION INTRA-ARTICULAR; INTRALESIONAL; INTRAMUSCULAR; SOFT TISSUE at 10:01

## 2017-12-19 RX ADMIN — ONDANSETRON 4 MG: 2 INJECTION INTRAMUSCULAR; INTRAVENOUS at 10:27

## 2017-12-19 NOTE — PROGRESS NOTES
"Subjective   Veronica Hicks is a 32 y.o. female who presents to the office for one mtn F/U.     History of Present Illness     DM-Patient states that she is a 'diabetic that her blood sugar goes down when she eats a lot of sugar, but when she eats healthy her blood sugars elevated'.  Patient states that this is a phenomenon that has to do with her pancreas and she has  'had her pancreas enzymes checked in the past and she was told they were elevated but not enough to worry'.  Patient does take her fingerstick blood sugars frequently because she was told to by some doctor in her past to check them, patient states that she is checking her FSBS in the ams and 45mins-1 hr after breakfast, she didn't bring a log but knows she is running 200-300s since I last saw her. Patient denies any changes in diet. Agreeable to A1c today since FSBS level normal with last lab draw. Patient states she needs lancets.       Cardiac problems-states that she has seen a cardiologist years ago and they suggested a stress test on the treadmill however patient wasn't able to use it due to her knee problems.  Patient has not seen a cardiologist from then, states that she has chest pain and can \"feel\" blood clots moving through her chest and into her arm on several different occasions over the past couple years.  She states that when these episodes happen, she has an arrhthymia (no diagnosed she just feels it) and she stops breathing and turns blue, but if her boyfriend hits her sternum, she starts breathing again. Patient states that she's had an EEG done and that they found \"3 dark spots which were mini strokes.\"  Patient also states history of off and on pericarditis. Patient states that her blood pressure fluctuates, usually related to anxiety.  I was unable to find most of the history related to the blood clots in mini strokes and pericarditis in patient's history and records.  Patient could have been misunderstanding some of the " "information given to her in the past on her medical conditions. Patient states at appt today that she has not heard from cardiology, records show that appt with Dr. Antoine cardiologist for 2/9/18 was cancelled \"by patient\". Pt educated to call dr antoine's office and set up appt with him for a time that work for her.      Anxiety/depression-Patient is currently seen Lesa and was taking Prozac for her anxiety but is not working very well. Patient states that her blood pressure fluctuates, usually related to anxiety. Patient states she is not able to get into seeing pennyrile and has tried to schedule a follow up appt with no luck. Patient placed on cymbalta on 11/17 and called about a week later to say it was making her symptoms worse and she wanted to stop it and would F/U with pennyrile. Patient states she recently saw pennyrile and the provider said that she should F/U with me to change her back to prozac. Patient states she would like to go back onto prozac because she knows it works some. Patient states a lot of stress in her life, initially with her son, and that increases her stress level.     Leg pain-Patient describes pain with walking in her legs, along with numbness and tingling, pain will start in her legs after 5-10 minutes of walking every time, decreases when she starts to rest, also hurts to elevate her legs, patient states that she sometimes has swelling in her legs as well. Patient also states have extremely sweating smelly feet despite trying over the counter medications and not wearing socks or tennis shoes, only crocks. Patient states that she shakes her legs because they hurt so much.  Patient states that her friend at nighttime states her legs shake when she is asleep.  LEONID done on 11/14/17 was 0.89 in left leg showing mild PAD.  Patient has not seen cardiologist yet and has an appointment in February. patient states she has had chronic knee pain for years, injured her knees, has " "had multiple surgeries, and states that her knees are no longer fixable with surgery.  States pain when walking on them, more achy pain in the joint. Patient is not taking anything for this would like to be started on something.  Denies any radiation but thinks that it might be linked to her peripheral neuropathy type pain. Patient started on aspirin and pletal and diclofenac and and requip for RLS like symptoms on 11/17/17. Patient states none of them are working and she has stopped taking them. Patient states trying gabapentin 300mg TID in the past and it \"knocked her out\", patient agreeable to continue pletal since its effects take longer than a mtn as well as aspirin and appt with Dr. López to discuss initiation of gabapentin at a lower dose. Patient states history of smoking weed to help with leg pain but has not done it recently.      Sinus infection-Patient went to ER on 10-31-17 at [facility] for suspected pleurisy and was prescribed Medrol Dosepak and naproxen and educated patient to follow-up with primary care provider. Patient states that she is not much better than when I treated her for this on 11/2/2017, patient states no fever because she states she never has a fever.  Patient does have a fever at office visit today.  Also complaining of sinus congestion, sinus pain and pressure maxillary, headache, and cough.  Patient very adamant that she needs a longer dose of antibiotics.  Patient has yeast infections with antibiotic and wants Diflucan to prevent this. On 11/17/17, patient put on levaquin x 5 days and diflucan and albuterol inhaler. Today 12/18/17, patient coming of severe cough, sometimes productive, and pain with coughing. States SOB and chest tightness and that she is no better than a mtn.     HLD: patient placed on lovastatin for hyperlipidemia and states muscle aches in legs since she started and wants to come off of lovastatin but agreeable to being on omega 3 fatty " "acids.     Pt states hx of foreign object described as a metal ring found after she had surgery done by Dr. Smith several years ago. She later states she had a CT or MRI done of her RUQ for chronic pain by Dr. Hutton in [place] and that the radiology tech showed her the metal ring and it had \"migrated into her right pelvic bone.\" patient states Dr. Hutton was not worried about it because it was unrelated to RUQ pain. Patient wants this removed. Patient agreeable to signing for release of records from Dr. Smith and Dr. Hutton to review before further tests are ordered.     The following portions of the patient's history were reviewed and updated as appropriate: allergies, current medications, past family history, past medical history, past social history, past surgical history and problem list.    Review of Systems   Constitutional: Positive for activity change, chills, diaphoresis and fatigue. Negative for fever.   HENT: Positive for congestion, postnasal drip, sinus pain and sinus pressure. Negative for nosebleeds, rhinorrhea, sneezing, sore throat, tinnitus, trouble swallowing and voice change.    Eyes: Negative.    Respiratory: Positive for cough, chest tightness, shortness of breath and wheezing.    Cardiovascular: Positive for chest pain and leg swelling. Negative for palpitations.   Gastrointestinal: Positive for abdominal pain, diarrhea, nausea and vomiting.   Genitourinary: Negative.  Negative for dysuria.   Musculoskeletal: Positive for arthralgias (bilateral lower extremities), gait problem and joint swelling.   Neurological: Negative for light-headedness.   Psychiatric/Behavioral: Negative for self-injury, sleep disturbance and suicidal ideas. The patient is nervous/anxious.        Past Medical History:   Diagnosis Date   • Abdominal pain    • Abnormal Pap smear of cervix    • Acute bronchitis    • Acute left otitis media    • Ankle pain    • Anxiety    • Asthma    • Attention deficit hyperactivity " "disorder    • Bipolar disorder    • Candidiasis    • Candidiasis of vagina    • Depression    • Diabetes mellitus    • Diarrhea    • Dizziness    • Dysuria    • Elbow joint pain     elbow joint - painful on movement   • Elbow joint pain    • Elevated blood pressure    • Encounter for long-term (current) use of medications     Long-term (current) use of other medications    • Epigastric pain    • Excessive thirst    • Fall    • Fatigue    • Feeling tired     tired all the time   • Flank pain    • Gastroesophageal reflux disease    • High risk sexual behavior    • History of malignant neoplasm of cervix    • Hordeolum internum right upper eyelid    • HPV (human papilloma virus) infection    • Hypokalemia    • Increased frequency of urination    • Infection of skin and subcutaneous tissue    • Irritable bowel syndrome    • Knee pain, left    • Low back pain    • Muscle weakness    • Nausea and vomiting    • Pain in limb    • Pain in wrist    • Postartificial menopausal syndrome    • Postmenopausal state    • Serous otitis media    • Skin lesion    • Smokes tobacco daily    • Upper respiratory infection    • Urinary tract infection    • Vaginitis        Family History   Problem Relation Age of Onset   • ADD / ADHD Daughter    • Lung cancer Maternal Grandmother    • Cancer Paternal Grandmother    • Uterine cancer Paternal Grandmother    • Colon cancer Paternal Grandmother    • Diabetes Other    • Liver cancer Other    • Breast cancer Mother    • Ovarian cancer Mother    • Birth defects Brother    • Stroke Paternal Grandfather    • Endometrial cancer Neg Hx           Objective   /80  Pulse 93  Temp 98 °F (36.7 °C)  Ht 160 cm (63\")  Wt 103 kg (226 lb)  Breastfeeding? No  BMI 40.03 kg/m2  Physical Exam   Constitutional: She is oriented to person, place, and time. She appears well-developed and well-nourished. No distress.   HENT:   Right Ear: Hearing, external ear and ear canal normal. Tympanic membrane is " injected and bulging.   Left Ear: Hearing, external ear and ear canal normal. Tympanic membrane is injected and bulging.   Nose: Mucosal edema present. No sinus tenderness. Right sinus exhibits maxillary sinus tenderness. Right sinus exhibits no frontal sinus tenderness. Left sinus exhibits maxillary sinus tenderness. Left sinus exhibits no frontal sinus tenderness.   Mouth/Throat: Uvula is midline and mucous membranes are normal. Dental caries present. Oropharyngeal exudate present.   Eyes: Conjunctivae and EOM are normal. Pupils are equal, round, and reactive to light.   Cardiovascular: Normal rate, regular rhythm, normal heart sounds and intact distal pulses.  Exam reveals no gallop and no friction rub.    No murmur heard.  Pulses:       Radial pulses are 2+ on the right side, and 2+ on the left side.        Dorsalis pedis pulses are 2+ on the right side, and 2+ on the left side.        Posterior tibial pulses are 2+ on the right side, and 2+ on the left side.   No swelling of lower extremities   Pulmonary/Chest: Effort normal. No respiratory distress. She has decreased breath sounds. She has no wheezes. She has no rales.   Abdominal: Soft. Normal appearance and bowel sounds are normal. She exhibits no shifting dullness, no distension, no pulsatile liver, no fluid wave, no abdominal bruit, no ascites, no pulsatile midline mass and no mass. There is no hepatosplenomegaly. There is no tenderness. There is no rigidity, no rebound, no guarding and no CVA tenderness. No hernia.   Musculoskeletal:        Right knee: She exhibits normal range of motion. Tenderness found.        Left knee: She exhibits normal range of motion. Tenderness found.   Neurological: She is alert and oriented to person, place, and time. She is not disoriented.   Skin: Skin is warm, dry and intact.   Psychiatric: She has a normal mood and affect. Her speech is normal and behavior is normal. Thought content normal. She is not actively  hallucinating. Cognition and memory are normal.   Patient is dressed appropriately for weather and situation, makes eye contact, and engages in conversation. Patient will not sit still during office visit keeps shaking legs and rocking in chair intermittently. States leg pain is bothering her a lot today and she doesn't feel good. She is attentive.   Nursing note and vitals reviewed.       PHQ-2/PHQ-9 Depression Screening 11/17/2017   Little interest or pleasure in doing things 2   Feeling down, depressed, or hopeless 2   Trouble falling or staying asleep, or sleeping too much 3   Feeling tired or having little energy 3   Poor appetite or overeating 0   Feeling bad about yourself - or that you are a failure or have let yourself or your family down 0   Trouble concentrating on things, such as reading the newspaper or watching television 3   Moving or speaking so slowly that other people could have noticed. Or the opposite - being so fidgety or restless that you have been moving around a lot more than usual 0   Thoughts that you would be better off dead, or of hurting yourself in some way 0   Total Score 13   If you checked off any problems, how difficult have these problems made it for you to do your work, take care of things at home, or get along with other people? Not difficult at all         Assessment/Plan   Veronica was seen today for follow-up.    Diagnoses and all orders for this visit:    Hyperlipidemia, unspecified hyperlipidemia type  -     Omega-3 Fatty Acids (FISH OIL) 1000 MG capsule capsule; Take 1 capsule by mouth 3 (Three) Times a Day With Meals.    PAD (peripheral artery disease)  Comments:  minimal on left leg  Orders:  -     cilostazol (PLETAL) 100 MG tablet; Take 1 tablet by mouth Daily. For PAD  -     aspirin 81 MG EC tablet; Take 1 tablet by mouth Daily. For PAD    Cough  -     XR Chest 2 View  -     promethazine-dextromethorphan (PROMETHAZINE-DM) 6.25-15 MG/5ML syrup; Take 5 mL by mouth 4  (Four) Times a Day As Needed (cough and nausea). Do not drive with this medication  -     methylPREDNISolone acetate (DEPO-medrol) injection 80 mg; Inject 1 mL into the shoulder, thigh, or buttocks 1 (One) Time.    Shortness of breath  -     XR Chest 2 View  -     methylPREDNISolone acetate (DEPO-medrol) injection 80 mg; Inject 1 mL into the shoulder, thigh, or buttocks 1 (One) Time.    Depression, unspecified depression type  -     FLUoxetine (PROzac) 20 MG capsule; Take 1 capsule by mouth Daily.  -     methylPREDNISolone acetate (DEPO-medrol) injection 80 mg; Inject 1 mL into the shoulder, thigh, or buttocks 1 (One) Time.    OLAMIDE (generalized anxiety disorder)  -     FLUoxetine (PROzac) 20 MG capsule; Take 1 capsule by mouth Daily.    Fever, unspecified fever cause  -     CBC & Differential  -     CBC Auto Differential    Elevated random blood glucose level  -     Hemoglobin A1c    Nausea and vomiting, intractability of vomiting not specified, unspecified vomiting type  -     ondansetron (ZOFRAN) injection 4 mg; Inject 2 mL into the shoulder, thigh, or buttocks 1 (One) Time.    Other orders  -     Discontinue: ondansetron (ZOFRAN) 4 MG/2ML injection; Inject 2 mL into the shoulder, thigh, or buttocks 1 (One) Time for 1 dose.      DM-refill on lancets, ordered A1c, educated patient to take FSBS before meal and at least two hrs after, to record and bring into next appt in one mtn.      Cardiac problems-patient told to schedule appt with Dr. enrique      Anxiety-d/c cymbalta, start on prozac again but at 20mg daily. F/u in one mtn.     Leg pain-d/c diclofenac, d/c requip, continue pletal and aspirin, appt with dr healy to start gabapentin     Sinus infection and fever-CXR ordered, CBC ordered, depo medrol shot and phen dm for cough, f/u based on cxr    HLD-d/c lovastatin, start on omega 3 fatty acids, recheck lipids in six mtns, diet education provided as well     Pt states hx of foreign object found in right  pelvic bone-patient will request records to be faxed from dr benavides and dr butcher    Nausea/Vomitting in office-zofran IM given in office, helped to stop vomiting and nausea reports pt after 30 mins     Patient educated to follow-up sooner than next scheduled appointment if condition(s) worse or do not improve. Patient states understanding and is in agreeance with plan of care. An After Visit Summary was printed and given to the patient.      Current Outpatient Prescriptions:   •  albuterol (PROVENTIL HFA;VENTOLIN HFA) 108 (90 Base) MCG/ACT inhaler, Inhale 2 puffs Every 6 (Six) Hours As Needed for Wheezing or Shortness of Air., Disp: 1 inhaler, Rfl: 3  •  aspirin 81 MG EC tablet, Take 1 tablet by mouth Daily. For PAD, Disp: 30 tablet, Rfl: 5  •  Blood Glucose Monitoring Suppl (ONE TOUCH ULTRA MINI) W/DEVICE kit, , Disp: , Rfl: 0  •  cilostazol (PLETAL) 100 MG tablet, Take 1 tablet by mouth Daily. For PAD, Disp: 30 tablet, Rfl: 5  •  fluconazole (DIFLUCAN) 150 MG tablet, Take 1-150 mg tablet now and 1-150mg tablet in 7 days., Disp: 2 tablet, Rfl: 0  •  glucose blood test strip, 1 each by Other route Daily., Disp: 50 each, Rfl: 11  •  glucose monitor monitoring kit, To test blood sugars once daily, Disp: 1 each, Rfl: 0  •  ONETOUCH DELICA LANCETS 33G misc, 1 application by Other route Daily., Disp: 50 each, Rfl: 11  •  rOPINIRole (REQUIP) 1 MG tablet, Take 1 tablet by mouth Every Night. For leg pain, Disp: 30 tablet, Rfl: 0  •  FLUoxetine (PROzac) 20 MG capsule, Take 1 capsule by mouth Daily., Disp: 30 capsule, Rfl: 0  •  Omega-3 Fatty Acids (FISH OIL) 1000 MG capsule capsule, Take 1 capsule by mouth 3 (Three) Times a Day With Meals., Disp: 90 capsule, Rfl: 5  •  polyethylene glycol (MIRALAX) powder, As directed per instruction sheet for colonoscopy, Disp: 255 g, Rfl: 0  •  promethazine-dextromethorphan (PROMETHAZINE-DM) 6.25-15 MG/5ML syrup, Take 5 mL by mouth 4 (Four) Times a Day As Needed (cough and nausea). Do not  drive with this medication, Disp: 118 mL, Rfl: 0  •  traZODone (DESYREL) 50 MG tablet, QHS PRN as needed for SLEEPLESSNESS, Disp: , Rfl:   No current facility-administered medications for this visit.       ABHISHEK Bowman        This document has been electronically signed by ABHISHEK Bowman on December 19, 2017 6:04 PM      EMR/Transcription Dragon Disclaimer:  Some of this note may be an electronic dragon transcription/translation of spoken language to printed text. The electronic translation of spoken language may permit erroneous, or at times, nonsensical words or phrases to be inadvertently transcribed. Although I have reviewed the note for such errors, some may still exist.

## 2017-12-20 ENCOUNTER — TELEPHONE (OUTPATIENT)
Dept: FAMILY MEDICINE CLINIC | Facility: CLINIC | Age: 32
End: 2017-12-20

## 2017-12-20 RX ORDER — BACLOFEN 10 MG/1
10 TABLET ORAL 2 TIMES DAILY PRN
Qty: 60 TABLET | Refills: 0 | Status: SHIPPED | OUTPATIENT
Start: 2017-12-20 | End: 2018-03-19

## 2017-12-20 NOTE — TELEPHONE ENCOUNTER
----- Message from ABHISHEK Bowman sent at 12/19/2017 10:58 AM CST -----  Chest xray normal so most likely bronchitis which the steriod will help with and tell her to use albuterol inhaler q 4-6 hrs until cough improves some then she can wean off off, WBC still slightly elevated but nothing concerning, need to recheck in one mtn just to be safe. Most likely still elevated because of the bronchitis she has in her chest. Looks like she had cardiology appt with Dr. enrique (patient states she hadn't heard from them yet) on 2/9/18 that states patient cancelled appt, could be a mistake so just tell her to call them and reschedule for an appt that works for her. thanks

## 2017-12-20 NOTE — TELEPHONE ENCOUNTER
Pt has an appt with Violet for blood clots. She isn't going to see heatherly anymore. Pt came in here to get results read them face to face

## 2017-12-21 ENCOUNTER — TELEPHONE (OUTPATIENT)
Dept: FAMILY MEDICINE CLINIC | Facility: CLINIC | Age: 32
End: 2017-12-21

## 2018-01-15 ENCOUNTER — TELEPHONE (OUTPATIENT)
Dept: GASTROENTEROLOGY | Facility: CLINIC | Age: 33
End: 2018-01-15

## 2018-01-15 NOTE — TELEPHONE ENCOUNTER
Patient has been contacted and made aware that an appointment has been scheduled for her to see Dr. Hoffman on Tuesday January 30 at 9:00. Patient voiced understanding.

## 2018-01-15 NOTE — TELEPHONE ENCOUNTER
----- Message from Ousmane uMrphy PA-C sent at 1/11/2018  5:04 PM CST -----  Have her come back and see Dr. Bee  ----- Message -----     From: Pattie Dhaliwal MA     Sent: 1/11/2018   4:57 PM       To: Ousmane Murphy PA-C    I was able to speak to her briefly then her phone shut off. I called her back however she did not answer but I did leave her a voicemail. She stated that she is hypoglycemic and her sugar bottomed out then her phone shut off. Any recommendations for this? She took miralax/gatorade.   ----- Message -----     From: Shannen Matos     Sent: 1/9/2018   2:05 PM       To: Pattie Dhaliwal MA    Pt states during the prep today she could not control her blood sugar, pt would like a call back

## 2018-01-17 ENCOUNTER — OFFICE VISIT (OUTPATIENT)
Dept: CARDIAC SURGERY | Facility: CLINIC | Age: 33
End: 2018-01-17

## 2018-01-17 VITALS
HEART RATE: 82 BPM | TEMPERATURE: 98.3 F | SYSTOLIC BLOOD PRESSURE: 100 MMHG | BODY MASS INDEX: 39.51 KG/M2 | DIASTOLIC BLOOD PRESSURE: 82 MMHG | HEIGHT: 63 IN | WEIGHT: 223 LBS | OXYGEN SATURATION: 98 %

## 2018-01-17 DIAGNOSIS — G45.9 TRANSIENT CEREBRAL ISCHEMIA, UNSPECIFIED TYPE: ICD-10-CM

## 2018-01-17 DIAGNOSIS — F17.218 NICOTINE DEPENDENCE, CIGARETTES, WITH OTHER NICOTINE-INDUCED DISORDERS: ICD-10-CM

## 2018-01-17 DIAGNOSIS — IMO0001 CLASS 3 OBESITY DUE TO EXCESS CALORIES WITH SERIOUS COMORBIDITY AND BODY MASS INDEX (BMI) OF 40.0 TO 44.9 IN ADULT: ICD-10-CM

## 2018-01-17 DIAGNOSIS — I73.9 PAD (PERIPHERAL ARTERY DISEASE) (HCC): Primary | ICD-10-CM

## 2018-01-17 DIAGNOSIS — IMO0002 UNCONTROLLED TYPE 2 DIABETES MELLITUS WITH COMPLICATION, WITHOUT LONG-TERM CURRENT USE OF INSULIN: ICD-10-CM

## 2018-01-17 DIAGNOSIS — K58.9 IRRITABLE BOWEL SYNDROME, UNSPECIFIED TYPE: ICD-10-CM

## 2018-01-17 PROCEDURE — 99204 OFFICE O/P NEW MOD 45 MIN: CPT | Performed by: THORACIC SURGERY (CARDIOTHORACIC VASCULAR SURGERY)

## 2018-01-17 PROCEDURE — 99406 BEHAV CHNG SMOKING 3-10 MIN: CPT | Performed by: THORACIC SURGERY (CARDIOTHORACIC VASCULAR SURGERY)

## 2018-01-17 NOTE — PROGRESS NOTES
1/17/2018    Veronicaphillip Hicks  1985    Chief Complaint:    Chief Complaint   Patient presents with   • Peripheral Vascular Disease       HPI:      PCP:  Fawn Anand APRN, Gina WEISS    32 y.o. female with DM2(stable, uncontrolled, increased risk cardiovascular events), PVD(new, increased risk cardiovascular events), Morbid Obesity(stable, BMI 40, uncontrolled, increased risk cardiovascular events), peripheral neuropathy(stable, increased risk feet injury).  smokes vapor PPD.  Moderate constant burning, stinging, falling asleep feet x 1 year.  Feet swell end of day.  Turn red/purple.  Multiple ministrokes.  No TIA  Amaurosis, prior stroke.  No MI claudication. No other associated signs, symptoms or modifying factors.    11/2017 ECG:  NSR 88, QTc 481  11/2017 LEONID([facility]):  RIGHT 1.0 triphasic.  LEFT .89 triphasic.  SBP RIGHT arm 180, LEFT arm 140mmHg  1/17/2018 LEONID:  RIGHT 1.2 triphasic.  LEFT 1.1 triphasic.  normal PPG waveforms bilateral toes.  SBP RIGHT arm 121, LEFT arm 125mmHg    The following portions of the patient's history were reviewed and updated as appropriate: allergies, current medications, past family history, past medical history, past social history, past surgical history and problem list.  Recent images independently reviewed.  Available laboratory values reviewed.    PMH:  Past Medical History:   Diagnosis Date   • Abdominal pain    • Abnormal Pap smear of cervix    • Acute bronchitis    • Acute left otitis media    • Ankle pain    • Anxiety    • Asthma    • Attention deficit hyperactivity disorder    • Bipolar disorder    • Candidiasis    • Candidiasis of vagina    • Depression    • Diabetes mellitus    • Diarrhea    • Dizziness    • Dysuria    • Elbow joint pain     elbow joint - painful on movement   • Elbow joint pain    • Elevated blood pressure    • Encounter for long-term (current) use of medications     Long-term (current) use of other medications    • Epigastric pain    •  Excessive thirst    • Fall    • Fatigue    • Feeling tired     tired all the time   • Flank pain    • Gastroesophageal reflux disease    • High risk sexual behavior    • History of malignant neoplasm of cervix    • Hordeolum internum right upper eyelid    • HPV (human papilloma virus) infection    • Hx of blood clots    • Hypokalemia    • Increased frequency of urination    • Infection of skin and subcutaneous tissue    • Irritable bowel syndrome    • Knee pain, left    • Low back pain    • Muscle weakness    • Nausea and vomiting    • Pain in limb    • Pain in wrist    • Peripheral arterial disease    • Postartificial menopausal syndrome    • Postmenopausal state    • Serous otitis media    • Skin lesion    • Smokes tobacco daily    • Upper respiratory infection    • Urinary tract infection    • Vaginitis        ALLERGIES:  Allergies   Allergen Reactions   • Penicillins Anaphylaxis   • Tomato Anaphylaxis   • Azithromycin Hives   • Bactrim [Sulfamethoxazole-Trimethoprim] Hives   • Cherry Flavor Swelling   • Zoloft [Sertraline Hcl] Hives   • Codeine      Jittery feeling    • Erythromycin Rash   • Keflex [Cephalexin] Rash         MEDICATIONS:    Current Outpatient Prescriptions:   •  albuterol (PROVENTIL HFA;VENTOLIN HFA) 108 (90 Base) MCG/ACT inhaler, Inhale 2 puffs Every 6 (Six) Hours As Needed for Wheezing or Shortness of Air., Disp: 1 inhaler, Rfl: 3  •  aspirin 81 MG EC tablet, Take 1 tablet by mouth Daily. For PAD, Disp: 30 tablet, Rfl: 5  •  baclofen (LIORESAL) 10 MG tablet, Take 1 tablet by mouth 2 (Two) Times a Day As Needed for Muscle Spasms., Disp: 60 tablet, Rfl: 0  •  Blood Glucose Monitoring Suppl (ONE TOUCH ULTRA MINI) W/DEVICE kit, , Disp: , Rfl: 0  •  cilostazol (PLETAL) 100 MG tablet, Take 1 tablet by mouth Daily. For PAD, Disp: 30 tablet, Rfl: 5  •  glucose blood test strip, 1 each by Other route Daily., Disp: 50 each, Rfl: 11  •  glucose monitor monitoring kit, To test blood sugars once daily,  Disp: 1 each, Rfl: 0  •  Omega-3 Fatty Acids (FISH OIL) 1000 MG capsule capsule, Take 1 capsule by mouth 3 (Three) Times a Day With Meals., Disp: 90 capsule, Rfl: 5  •  ONETOUCH DELICA LANCETS 33G misc, 1 application by Other route Daily., Disp: 50 each, Rfl: 11  •  promethazine-dextromethorphan (PROMETHAZINE-DM) 6.25-15 MG/5ML syrup, Take 5 mL by mouth 4 (Four) Times a Day As Needed (cough and nausea). Do not drive with this medication, Disp: 118 mL, Rfl: 0    Review of Systems   Review of Systems   Constitution: Positive for malaise/fatigue and night sweats. Negative for weight loss.   HENT: Negative for hearing loss, hoarse voice and stridor.    Eyes: Negative for vision loss in left eye, vision loss in right eye and visual disturbance.   Cardiovascular: Positive for chest pain, claudication, irregular heartbeat and leg swelling. Negative for palpitations.   Respiratory: Positive for cough. Negative for hemoptysis and shortness of breath.    Hematologic/Lymphatic: Positive for bleeding problem. Negative for adenopathy. Bruises/bleeds easily.   Skin: Positive for color change, dry skin and poor wound healing. Negative for rash.   Musculoskeletal: Positive for arthritis, back pain, muscle weakness and neck pain.   Gastrointestinal: Positive for heartburn. Negative for abdominal pain and dysphagia.   Neurological: Positive for dizziness, headaches, numbness and paresthesias. Negative for seizures.   Psychiatric/Behavioral: Positive for depression. Negative for altered mental status and memory loss. The patient is nervous/anxious.        Physical Exam   Physical Exam   Constitutional: She is oriented to person, place, and time. She is active and cooperative. She does not appear ill. No distress.   HENT:   Head: Atraumatic.   Right Ear: Hearing normal.   Left Ear: Hearing normal.   Nose: No nasal deformity. No epistaxis.   Mouth/Throat: She does not have dentures. Normal dentition.   Eyes: Conjunctivae and lids are  normal. Right pupil is round and reactive. Left pupil is round and reactive.   Neck: No JVD present. Carotid bruit is not present. No tracheal deviation present. No thyroid mass and no thyromegaly present.   Cardiovascular: Normal rate and regular rhythm.    No murmur heard.  Pulses:       Carotid pulses are 2+ on the right side, and 2+ on the left side.       Radial pulses are 2+ on the right side, and 2+ on the left side.        Dorsalis pedis pulses are 1+ on the right side, and 1+ on the left side.        Posterior tibial pulses are 1+ on the right side, and 1+ on the left side.   Pulmonary/Chest: Effort normal and breath sounds normal.   Abdominal: Soft. She exhibits no distension and no mass. There is no splenomegaly or hepatomegaly. There is no tenderness.   Musculoskeletal: She exhibits no deformity.   Gait normal.    Lymphadenopathy:     She has no cervical adenopathy.        Right: No supraclavicular adenopathy present.        Left: No supraclavicular adenopathy present.   Neurological: She is alert and oriented to person, place, and time. She has normal strength.   Skin: Skin is warm and dry. No cyanosis or erythema. No pallor.   No venous staining   Psychiatric: She has a normal mood and affect. Her speech is normal. Judgment and thought content normal.     Results for VERONICA NEWELL (MRN [id number]) as of 1/17/2018 15:07   Ref. Range 11/16/2017 11:24   Creatinine Latest Ref Range: 0.40 - 1.30 mg/dL 0.90   BUN Latest Ref Range: 8 - 25 mg/dL 14     ASSESSMENT:  Veronica was seen today for peripheral vascular disease.    Diagnoses and all orders for this visit:    PAD (peripheral artery disease)  -     Doppler Ankle Brachial Index Single Level CAR    Irritable bowel syndrome, unspecified type    Uncontrolled type 2 diabetes mellitus with complication, without long-term current use of insulin    Transient cerebral ischemia, unspecified type    Nicotine dependence, cigarettes, with other nicotine-induced  disorders    Class 3 obesity due to excess calories with serious comorbidity and body mass index (BMI) of 40.0 to 44.9 in adult    PLAN:  Detailed discussion with Vreonica Hicks regarding situation and options.  Normal arterial perfusion to feet.  Symptoms consistent with diabetic peripheral neuropathy.  Multiple risk factors with severe comorbidities.  No intervention indicated at this time.   Risks, benefits discussed.  Understands and wishes to proceed with plan.    Return as needed or worsening symptoms.  Smoking cessation advised and assistance options offered including behavioral counseling ([facility]), Nicotine replacement therapy (patches or gum), pharmacologic therapy (Chantix, Wellbutrin). patient understands that continued use of tobacco products increases her risk of MI, CVA, PAD, cancer; counseling for 3-5min.  Obesity Class 3. Options for weight management, heart healthy diet, exercise programs, and associated health risks of obesity discussed.   Recommended regular physical activity, progressive walking program.  Continue current medications as directed.  Will Obtain relevant old records.    Thank you for the opportunity to participate in this patient's care.    Copy to primary care provider.    EMR Dragon/Transcription disclaimer:   Much of this encounter note is an electronic transcription/translation of spoken language to printed text. The electronic translation of spoken language may permit erroneous, or at times, nonsensical words or phrases to be inadvertently transcribed; Although I have reviewed the note for such errors, some may still exist.

## 2018-01-18 LAB
BH CV LOWER ARTERIAL LEFT ABI RATIO: 1.11
BH CV LOWER ARTERIAL LEFT DORSALIS PEDIS SYS MAX: 139 MMHG
BH CV LOWER ARTERIAL LEFT POST TIBIAL SYS MAX: 134 MMHG
BH CV LOWER ARTERIAL RIGHT ABI RATIO: 1.16
BH CV LOWER ARTERIAL RIGHT DORSALIS PEDIS SYS MAX: 141 MMHG
BH CV LOWER ARTERIAL RIGHT POST TIBIAL SYS MAX: 145 MMHG
UPPER ARTERIAL LEFT ARM BRACHIAL SYS MAX: 125 MMHG
UPPER ARTERIAL RIGHT ARM BRACHIAL SYS MAX: 121 MMHG

## 2018-01-19 ENCOUNTER — TELEPHONE (OUTPATIENT)
Dept: FAMILY MEDICINE CLINIC | Facility: CLINIC | Age: 33
End: 2018-01-19

## 2018-01-19 NOTE — TELEPHONE ENCOUNTER
----- Message from ABHISHEK Bowman sent at 1/17/2018  3:58 PM CST -----  She was switched to prozac but no a lower dose and was supposed to schedule a one mtn f/u with me to see how she was doing on that and with all her referrrals. So she needs an appt if she wants to increase or change prozac, if not and she just wants it refilled at 20mg daily, I can do that. Also, she still needs to see dr enrique. Dr darling is a vascular specialist so he looks at your risk factors and peripheral arterial disease that we dx via her LEONID she had done and decides if she needs to be on anything stronger than aspirin which he said she didn't. arash is a cardiologist that will see her for her Chest pain and other heart problems, he will most likely do an echo and stress test so she needs to reschedule an appt to see him to get further testing done. thanks

## 2018-01-24 ENCOUNTER — OFFICE VISIT (OUTPATIENT)
Dept: FAMILY MEDICINE CLINIC | Facility: CLINIC | Age: 33
End: 2018-01-24

## 2018-01-24 VITALS
DIASTOLIC BLOOD PRESSURE: 78 MMHG | BODY MASS INDEX: 39.51 KG/M2 | OXYGEN SATURATION: 99 % | HEART RATE: 79 BPM | RESPIRATION RATE: 18 BRPM | TEMPERATURE: 98.1 F | HEIGHT: 63 IN | SYSTOLIC BLOOD PRESSURE: 118 MMHG | WEIGHT: 223 LBS

## 2018-01-24 DIAGNOSIS — R53.83 OTHER FATIGUE: Primary | ICD-10-CM

## 2018-01-24 DIAGNOSIS — R07.9 CHEST PAIN, UNSPECIFIED TYPE: ICD-10-CM

## 2018-01-24 DIAGNOSIS — L70.0 ACNE VULGARIS: ICD-10-CM

## 2018-01-24 DIAGNOSIS — Z86.2 HISTORY OF IRON DEFICIENCY ANEMIA: ICD-10-CM

## 2018-01-24 DIAGNOSIS — D36.7 CYST, DERMOID, LEG, LEFT: ICD-10-CM

## 2018-01-24 PROCEDURE — 99214 OFFICE O/P EST MOD 30 MIN: CPT | Performed by: NURSE PRACTITIONER

## 2018-01-24 RX ORDER — FLUOXETINE HYDROCHLORIDE 20 MG/1
20 CAPSULE ORAL DAILY
COMMUNITY
End: 2018-01-30 | Stop reason: SDUPTHER
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
## 2018-01-26 ENCOUNTER — TELEPHONE (OUTPATIENT)
Dept: FAMILY MEDICINE CLINIC | Facility: CLINIC | Age: 33
End: 2018-01-26

## 2018-01-26 NOTE — TELEPHONE ENCOUNTER
----- Message from Juan Parsons sent at 1/26/2018  9:07 AM CST -----  Patient called wanting to speak to you. It was concerning her son Chandan's lab results and she states she needs to ask you a question.

## 2018-01-30 ENCOUNTER — TELEPHONE (OUTPATIENT)
Dept: FAMILY MEDICINE CLINIC | Facility: CLINIC | Age: 33
End: 2018-01-30

## 2018-01-30 DIAGNOSIS — F41.9 ANXIETY: Primary | ICD-10-CM

## 2018-01-30 DIAGNOSIS — E16.2 HYPOGLYCEMIA: ICD-10-CM

## 2018-01-30 RX ORDER — FLUOXETINE HYDROCHLORIDE 20 MG/1
20 CAPSULE ORAL DAILY
Qty: 30 CAPSULE | Refills: 5 | Status: SHIPPED | OUTPATIENT
Start: 2018-01-30 | End: 2018-06-20

## 2018-01-30 NOTE — TELEPHONE ENCOUNTER
----- Message from Bee Hernanedz sent at 1/29/2018  1:43 PM CST -----  Patient called in requesting a refill on   FLUoxetine (PROZAC) 20 MG capsule [20580] (Order [id number])    ## [facility] [place]

## 2018-01-31 ENCOUNTER — TELEPHONE (OUTPATIENT)
Dept: FAMILY MEDICINE CLINIC | Facility: CLINIC | Age: 33
End: 2018-01-31

## 2018-01-31 NOTE — TELEPHONE ENCOUNTER
Went over results with mom documentation is in the chart     ALSO we have changed derm Dr because other Dr did not take insurance so pt has been referred to another Dr Carrasco Parent

## 2018-02-01 LAB
BASOPHILS # BLD AUTO: 0.02 10*3/MM3 (ref 0–0.2)
BASOPHILS NFR BLD AUTO: 0.2 % (ref 0–2)
DEPRECATED RDW RBC AUTO: 42 FL (ref 36.4–46.3)
EOSINOPHIL # BLD AUTO: 0.22 10*3/MM3 (ref 0–0.7)
EOSINOPHIL NFR BLD AUTO: 2.6 % (ref 0–7)
ERYTHROCYTE [DISTWIDTH] IN BLOOD BY AUTOMATED COUNT: 14.5 % (ref 11.5–14.5)
HCT VFR BLD AUTO: 43.5 % (ref 35–45)
HGB BLD-MCNC: 14.1 G/DL (ref 12–15.5)
LYMPHOCYTES # BLD AUTO: 2.28 10*3/MM3 (ref 0.6–4.2)
LYMPHOCYTES NFR BLD AUTO: 27.4 % (ref 10–50)
MCH RBC QN AUTO: 26.1 PG (ref 26.5–34)
MCHC RBC AUTO-ENTMCNC: 32.4 G/DL (ref 31.4–36)
MCV RBC AUTO: 80.4 FL (ref 80–98)
MONOCYTES # BLD AUTO: 0.41 10*3/MM3 (ref 0–0.9)
MONOCYTES NFR BLD AUTO: 4.9 % (ref 0–12)
NEUTROPHILS # BLD AUTO: 5.39 10*3/MM3 (ref 2–8.6)
NEUTROPHILS NFR BLD AUTO: 64.9 % (ref 37–80)
PLATELET # BLD AUTO: 271 10*3/MM3 (ref 150–450)
PMV BLD AUTO: 9 FL (ref 8–12)
RBC # BLD AUTO: 5.41 10*6/MM3 (ref 3.77–5.16)
WBC NRBC COR # BLD: 8.32 10*3/MM3 (ref 3.2–9.8)

## 2018-02-01 PROCEDURE — 83540 ASSAY OF IRON: CPT | Performed by: NURSE PRACTITIONER

## 2018-02-01 PROCEDURE — 82306 VITAMIN D 25 HYDROXY: CPT | Performed by: NURSE PRACTITIONER

## 2018-02-01 PROCEDURE — 82607 VITAMIN B-12: CPT | Performed by: NURSE PRACTITIONER

## 2018-02-01 PROCEDURE — 83550 IRON BINDING TEST: CPT | Performed by: NURSE PRACTITIONER

## 2018-02-01 PROCEDURE — 82728 ASSAY OF FERRITIN: CPT | Performed by: NURSE PRACTITIONER

## 2018-02-01 PROCEDURE — 85025 COMPLETE CBC W/AUTO DIFF WBC: CPT | Performed by: NURSE PRACTITIONER

## 2018-02-02 DIAGNOSIS — E55.9 VITAMIN D DEFICIENCY: Primary | ICD-10-CM

## 2018-02-02 LAB
25(OH)D3 SERPL-MCNC: 21.7 NG/ML (ref 30–100)
FERRITIN SERPL-MCNC: 123 NG/ML (ref 6.2–137)
IRON 24H UR-MRATE: 99 MCG/DL (ref 37–170)
IRON SATN MFR SERPL: 35 % (ref 15–50)
TIBC SERPL-MCNC: 280 MCG/DL (ref 265–497)
VIT B12 BLD-MCNC: 345 PG/ML (ref 239–931)

## 2018-02-05 ENCOUNTER — TELEPHONE (OUTPATIENT)
Dept: FAMILY MEDICINE CLINIC | Facility: CLINIC | Age: 33
End: 2018-02-05

## 2018-02-05 DIAGNOSIS — E53.8 VITAMIN B 12 DEFICIENCY: Primary | ICD-10-CM

## 2018-02-05 RX ORDER — CYANOCOBALAMIN 1000 UG/ML
INJECTION, SOLUTION INTRAMUSCULAR; SUBCUTANEOUS
Qty: 1 ML | Refills: 19 | Status: SHIPPED | OUTPATIENT
Start: 2018-02-05 | End: 2018-08-28 | Stop reason: SDUPTHER

## 2018-02-05 NOTE — TELEPHONE ENCOUNTER
----- Message from ABHISHEK Bowman sent at 2/2/2018  4:02 PM CST -----  Iron panel and ferritin are normal which means that she doesn't have iron deficiency anemia.  I think that she has anemia related to her B12 being low.  Her B12 is still normal but it is 345 which means it on the lower end of normal and since she is having symptoms of fatigue with it, she can have B12 injections that she would like them.  Insurance usually covers a better she comes to get them here.  Let her know how I do the vitamin B12 injections please and let me know. Vit d very low but not low enough to have weekly dose however she needs to be on a daily supplement which I will call in to the pharmacy.  Let me know about vitamin B12 injections.

## 2018-02-06 ENCOUNTER — TELEPHONE (OUTPATIENT)
Dept: FAMILY MEDICINE CLINIC | Facility: CLINIC | Age: 33
End: 2018-02-06

## 2018-02-06 NOTE — TELEPHONE ENCOUNTER
Spoke with pt returned call pt left message yesterday     She will come to office for intial visit to do injections then she will do at home     And she gave permission to enforce Pedro food intolerances

## 2018-02-12 ENCOUNTER — OFFICE VISIT (OUTPATIENT)
Dept: FAMILY MEDICINE CLINIC | Facility: CLINIC | Age: 33
End: 2018-02-12

## 2018-02-12 VITALS
SYSTOLIC BLOOD PRESSURE: 110 MMHG | BODY MASS INDEX: 39.51 KG/M2 | RESPIRATION RATE: 16 BRPM | TEMPERATURE: 98 F | HEIGHT: 63 IN | DIASTOLIC BLOOD PRESSURE: 87 MMHG | OXYGEN SATURATION: 99 % | HEART RATE: 71 BPM | WEIGHT: 223 LBS

## 2018-02-12 DIAGNOSIS — R10.9 ABDOMINAL CRAMPING: ICD-10-CM

## 2018-02-12 DIAGNOSIS — R21 RASH: ICD-10-CM

## 2018-02-12 DIAGNOSIS — R19.7 DIARRHEA, UNSPECIFIED TYPE: ICD-10-CM

## 2018-02-12 DIAGNOSIS — R73.09 ABNORMAL BLOOD SUGAR: ICD-10-CM

## 2018-02-12 DIAGNOSIS — E53.8 VITAMIN B12 DEFICIENCY: ICD-10-CM

## 2018-02-12 DIAGNOSIS — R10.2 PELVIC PAIN: ICD-10-CM

## 2018-02-12 DIAGNOSIS — E16.2 HYPOGLYCEMIA: Primary | ICD-10-CM

## 2018-02-12 DIAGNOSIS — G47.00 INSOMNIA, UNSPECIFIED TYPE: ICD-10-CM

## 2018-02-12 PROCEDURE — 82784 ASSAY IGA/IGD/IGG/IGM EACH: CPT | Performed by: NURSE PRACTITIONER

## 2018-02-12 PROCEDURE — 83516 IMMUNOASSAY NONANTIBODY: CPT | Performed by: NURSE PRACTITIONER

## 2018-02-12 PROCEDURE — 86255 FLUORESCENT ANTIBODY SCREEN: CPT | Performed by: NURSE PRACTITIONER

## 2018-02-12 PROCEDURE — 99214 OFFICE O/P EST MOD 30 MIN: CPT | Performed by: NURSE PRACTITIONER

## 2018-02-12 RX ORDER — HYDROXYZINE HYDROCHLORIDE 25 MG/1
TABLET, FILM COATED ORAL
Qty: 60 TABLET | Refills: 0 | Status: SHIPPED | OUTPATIENT
Start: 2018-02-12 | End: 2018-06-20

## 2018-02-13 ENCOUNTER — TELEPHONE (OUTPATIENT)
Dept: FAMILY MEDICINE CLINIC | Facility: CLINIC | Age: 33
End: 2018-02-13

## 2018-02-13 NOTE — PROGRESS NOTES
Pt came in for B12 Injection training I did instruct patient as well as also gave pt her injection to demonstrate how to draw med's out of vial    B12 ML vial NDC 9156-8365-66  Lot # [id number]   EXP 05/2019    Pt tolerated well no blood

## 2018-02-13 NOTE — TELEPHONE ENCOUNTER
----- Message from ABHISHEK Bowman sent at 2/13/2018 10:05 AM CST -----  If you didn't let patient know, let her know that Dr. Jeffrey méndez ordered the same tests Chandan had that she wanted me to order yesterday and they were all negative. Insurance will not cover them to be done again since the results wouldn't have changed.

## 2018-02-13 NOTE — TELEPHONE ENCOUNTER
----- Message from ABHISHEK Bowman sent at 2/12/2018  3:04 PM CST -----  You can wait to tell her the results until the other GI lab work comes in.  X-ray of lower abdomen and pelvis shows single surgical clip and right adnexa.  No issues with the bowels.  And they are pelvic calcifications most likely representing the pheboliths which is a common finding that is not significant, just means calcification build up in the veins which most everyone has.

## 2018-02-13 NOTE — PROGRESS NOTES
"Subjective   Veronica Hicks is a 32 y.o. female who presents to the office for \"allergy Testing\" and being taught who to do Vit b12 injections    History of Present Illness     Fluctuating blood sugars/hypoglycemia-Patient states that she is a 'diabetic that her blood sugar goes down when she eats a lot of sugar, but when she eats healthy her blood sugars elevated'.  Patient states that this is a phenomenon that has to do with her pancreas and she has  'had her pancreas enzymes checked in the past and she was told they were elevated but not enough to worry'.  Patient does take her fingerstick blood sugars frequently because she was told to by some doctor in her past to check them, patient states that she is checking her FSBS in the ams and 45mins-1 hr after breakfast, she didn't bring a log but knows she is running 200-300s since I last saw her.  Patient also states that her blood sugar drops as low as the 40s.  Patient states when her blood sugar drops, she does not feel good and gets shaky and knows that it is her blood sugar dropping low.  Patient has brought in her glucometer and blood sugars have ranged from high 60s to 200s. Patient denies any changes in diet. A1c drawn since FSBS level normal with last lab draw, A1c on 12/18/17 was 5.5.  Patient very adamant about having blood sugar issues and being a diabetic, patient insistent on being referred to endocrinologist.  Patient has appointment with endocrinologist in May. Needs refills on lancets and test strips. Would like script for glucose tablets for if FSBS drops low when she is out and cannot get something to eat fast enough.    Patient has history of chronic diarrhea, some nausea and vomiting, and recurrent rash on her back: We have discussed about rash being related to acne in the past, however patient thinks that this is a gluten rash because it breaks out when she eats too much gluten.  Patient also states that she has more GI symptoms when she eats a " "lot of gluten.  Patient also states that she is very sensitive to chicken and has a lot of abdominal cramping and tightening after she eats chicken.  Patient would like inflammatory bowel disease panel and recurrent GI distress panel done as I did for her son a couple weeks ago.  I suggested a celiac test as well since patient thinks she has a sensitivity to gluten.     Patient has low vitamin B12 with symptoms of severe fatigue, started her on vitamin B12 injections, patient would like to do these at home so she is here today with her boyfriend to learn how to do the injections at home.  Patient needs syringes and needles in order to give these injections.    Insomnia: Patient states that she goes to psych for medications but they will not prescribe her anything for insomnia and states that she \"has to\" ask me about something for insomnia.  Pt states that she has trouble falling asleep as well as staying asleep.  He shouldn't states that she maybe gets 2-3 hours of sleep at nighttime.  Patient states that she has been on trazodone before and that it makes it worse and does not help her sleep at all.  She does not remember what dosage of trazodone she was on.  Patient states she has been on amitriptyline before and it does not help with anxiety and it does not make her sleepy.  He shouldn't states she has never tried hydroxyzine before.    Left hand and wrist swelling: Times the past 2 days.  Patient states it has swelled up and been slightly painful.  Patient states that she does not remember hitting anything on it.  Patient states painful to touch.  Patient states she has not tried anything for it yet.    Pt states hx of foreign object described as a metal ring found after she had surgery done by Dr. Smith several years ago. She later states she had a CT or MRI done of her RUQ for chronic pain by Dr. Hutton in [place] and that the radiology tech showed her the metal ring and it had \"migrated into her right " "pelvic bone.\" patient states Dr. Hutton was not worried about it because it was unrelated to RUQ pain. Patient wants this removed. Patient agreeable to doing an x-ray today and further testing if applicable. Pt states lots of pain in RLQ/right pelvic area lately.     The following portions of the patient's history were reviewed and updated as appropriate: allergies, current medications, past family history, past medical history, past social history, past surgical history and problem list.    Review of Systems   Constitutional: Positive for activity change and fatigue. Negative for chills, diaphoresis and fever.   HENT: Negative for congestion, nosebleeds, postnasal drip, rhinorrhea, sinus pain, sinus pressure, sneezing, sore throat, tinnitus, trouble swallowing and voice change.    Eyes: Negative.    Respiratory: Positive for cough and shortness of breath. Negative for chest tightness and wheezing.    Cardiovascular: Positive for chest pain and leg swelling. Negative for palpitations.   Gastrointestinal: Positive for abdominal pain, diarrhea, nausea and vomiting.   Genitourinary: Negative.  Negative for dysuria.   Musculoskeletal: Positive for arthralgias (bilateral lower extremities), gait problem and joint swelling.   Neurological: Negative for light-headedness.   Psychiatric/Behavioral: Negative for self-injury, sleep disturbance and suicidal ideas. The patient is nervous/anxious.        Past Medical History:   Diagnosis Date   • Abdominal pain    • Abnormal Pap smear of cervix    • Acute bronchitis    • Acute left otitis media    • Ankle pain    • Anxiety    • Asthma    • Attention deficit hyperactivity disorder    • Bipolar disorder    • Candidiasis    • Candidiasis of vagina    • Depression    • Diabetes mellitus    • Diarrhea    • Dizziness    • Dysuria    • Elbow joint pain     elbow joint - painful on movement   • Elbow joint pain    • Elevated blood pressure    • Encounter for long-term (current) use of " "medications     Long-term (current) use of other medications    • Epigastric pain    • Excessive thirst    • Fall    • Fatigue    • Feeling tired     tired all the time   • Flank pain    • Gastroesophageal reflux disease    • High risk sexual behavior    • History of malignant neoplasm of cervix    • Hordeolum internum right upper eyelid    • HPV (human papilloma virus) infection    • Hx of blood clots    • Hypokalemia    • Increased frequency of urination    • Infection of skin and subcutaneous tissue    • Irritable bowel syndrome    • Knee pain, left    • Low back pain    • Muscle weakness    • Nausea and vomiting    • Pain in limb    • Pain in wrist    • Peripheral arterial disease    • Postartificial menopausal syndrome    • Postmenopausal state    • Serous otitis media    • Skin lesion    • Smokes tobacco daily    • Upper respiratory infection    • Urinary tract infection    • Vaginitis        Family History   Problem Relation Age of Onset   • ADD / ADHD Daughter    • Lung cancer Maternal Grandmother    • Cancer Paternal Grandmother    • Uterine cancer Paternal Grandmother    • Colon cancer Paternal Grandmother    • Diabetes Other    • Liver cancer Other    • Breast cancer Mother    • Ovarian cancer Mother    • Birth defects Brother    • Stroke Paternal Grandfather    • Endometrial cancer Neg Hx           Objective   /87  Pulse 71  Temp 98 °F (36.7 °C) (Temporal Artery )   Resp 16  Ht 160 cm (63\")  Wt 101 kg (223 lb)  SpO2 99%  Breastfeeding? No  BMI 39.5 kg/m2  Physical Exam   Constitutional: She is oriented to person, place, and time. She appears well-developed and well-nourished. No distress.   HENT:   Right Ear: Hearing, tympanic membrane, external ear and ear canal normal. Tympanic membrane is not injected and not bulging.   Left Ear: Hearing, tympanic membrane, external ear and ear canal normal. Tympanic membrane is not injected and not bulging.   Nose: No mucosal edema or sinus tenderness. " Right sinus exhibits no maxillary sinus tenderness and no frontal sinus tenderness. Left sinus exhibits no maxillary sinus tenderness and no frontal sinus tenderness.   Mouth/Throat: Uvula is midline and mucous membranes are normal. No dental caries. No oropharyngeal exudate.   Eyes: Conjunctivae and EOM are normal. Pupils are equal, round, and reactive to light.   Cardiovascular: Normal rate, regular rhythm, normal heart sounds and intact distal pulses.  Exam reveals no gallop and no friction rub.    No murmur heard.  Pulses:       Radial pulses are 2+ on the right side, and 2+ on the left side.        Dorsalis pedis pulses are 2+ on the right side, and 2+ on the left side.        Posterior tibial pulses are 2+ on the right side, and 2+ on the left side.   No swelling of lower extremities   Pulmonary/Chest: Effort normal. No respiratory distress. She has no decreased breath sounds. She has no wheezes. She has no rales.   Abdominal: Soft. Normal appearance and bowel sounds are normal. She exhibits no shifting dullness, no distension, no pulsatile liver, no fluid wave, no abdominal bruit, no ascites, no pulsatile midline mass and no mass. There is no hepatosplenomegaly. There is generalized tenderness. There is no rigidity, no rebound, no guarding and no CVA tenderness. No hernia.       Musculoskeletal:        Right knee: She exhibits normal range of motion. Tenderness found.        Left knee: She exhibits normal range of motion. Tenderness found.   Neurological: She is alert and oriented to person, place, and time. She is not disoriented.   Skin: Skin is warm, dry and intact.        Psychiatric: She has a normal mood and affect. Her speech is normal and behavior is normal. Thought content normal. She is not actively hallucinating. Cognition and memory are normal.   Patient is dressed appropriately for weather and situation, makes eye contact, and engages in conversation. Patient will not sit still during office  visit keeps shaking legs and rocking in chair intermittently. States leg pain is bothering her a lot today and she doesn't feel good. She is attentive.   Nursing note and vitals reviewed.       PHQ-2/PHQ-9 Depression Screening 2/12/2018   Little interest or pleasure in doing things 0   Feeling down, depressed, or hopeless 0   Trouble falling or staying asleep, or sleeping too much -   Feeling tired or having little energy -   Poor appetite or overeating -   Feeling bad about yourself - or that you are a failure or have let yourself or your family down -   Trouble concentrating on things, such as reading the newspaper or watching television -   Moving or speaking so slowly that other people could have noticed. Or the opposite - being so fidgety or restless that you have been moving around a lot more than usual -   Thoughts that you would be better off dead, or of hurting yourself in some way -   Total Score 0   If you checked off any problems, how difficult have these problems made it for you to do your work, take care of things at home, or get along with other people? -         Assessment/Plan   Veronica was seen today for follow-up.    Diagnoses and all orders for this visit:    Hypoglycemia  -     ONE TOUCH LANCETS misc; 1 each Daily.  -     glucose blood test strip; 1 each by Other route 3 (Three) Times a Day. Use as instructed  -     glucose (B-D GLUCOSE) 5 g chewable tablet; Chew 3 tablets As Needed (for blood sugar below 60).    Abdominal cramping  -     Cancel: Inflammatory Bowel Disease Panel  -     Cancel: Recurrent Gastrointestinal Distress  -     Celiac Disease Panel    Rash  -     Celiac Disease Panel    Diarrhea, unspecified type  -     Cancel: Inflammatory Bowel Disease Panel  -     Cancel: Recurrent Gastrointestinal Distress  -     Celiac Disease Panel    Pelvic pain  Comments:  right lower    Orders:  -     XR Abdomen KUB    Insomnia, unspecified type  -     hydrOXYzine (ATARAX) 25 MG tablet; take  "1-2 tablets at bedtime for insomnia    Vitamin B12 deficiency  -     Syringe/Needle, Disp, 22G X 1-1/2\" 3 ML misc; 1 syringe per Vit b12 injection, 1st mtn: 1/wwlp3evzd, 1/wkx5wk, 1/mtnx11 mtns    Abnormal blood sugar  Comments:  per pt      Fluctuating blood sugars/hypoglycemia: Prescribed glucose tablets for hypoglycemia, refills for lancets and test strips for patient to check blood sugar.    Diarrhea/abdominal pain/some nausea and vomiting/rash: Patient wanted recurrent GI distress panel and inflammatory bowel disease panel, after doing some research Dr. Murphy has done both these levels and they were negative.  Insurance will not cover for these to be done again.  Patient is seen GI specialist Dr. Murphy.  Ordered celiac panel.  Will call patient with results.    Vit b12 def: Betsy KRAUS will instruct patient on how to do injections, called in prescription for needles and syringes for patient to give self vitamin B12 injections at home.      Insomnia: started pt on hydroxyzine 25-50mg at bedtime. F/U prn.     Left hand and wrist swelling: Heat/cold whichever tolerable 20 minutes on 20 minutes off, educated patient to monitor for changes and notify me if changes occur or condition does not continue to improve.    Pelvic pain related to hx of foreign object:  Ordered KUB x-ray, will call patient and do further evaluation pending results.  Toradol shot given in office.     Patient educated to follow-up sooner than next scheduled appointment if condition(s) worse or do not improve. Patient states understanding and is in agreeance with plan of care. An After Visit Summary was printed and given to the patient.      Current Outpatient Prescriptions:   •  albuterol (PROVENTIL HFA;VENTOLIN HFA) 108 (90 Base) MCG/ACT inhaler, Inhale 2 puffs Every 6 (Six) Hours As Needed for Wheezing or Shortness of Air., Disp: 1 inhaler, Rfl: 3  •  aspirin 81 MG EC tablet, Take 1 tablet by mouth Daily. For PAD, Disp: 30 tablet, Rfl: 5  • " " baclofen (LIORESAL) 10 MG tablet, Take 1 tablet by mouth 2 (Two) Times a Day As Needed for Muscle Spasms., Disp: 60 tablet, Rfl: 0  •  Blood Glucose Monitoring Suppl (ONE TOUCH ULTRA MINI) W/DEVICE kit, , Disp: , Rfl: 0  •  Calcium Carb-Cholecalciferol (CALCIUM-VITAMIN D) 500-200 MG-UNIT per tablet, Take 1 tablet by mouth 2 (Two) Times a Day With Meals., Disp: 60 tablet, Rfl: 5  •  cilostazol (PLETAL) 100 MG tablet, Take 1 tablet by mouth Daily. For PAD, Disp: 30 tablet, Rfl: 5  •  cyanocobalamin 1000 MCG/ML injection, 1,000mcg/1mL Vitamin B12 IM, every day x5days, every week [id number], then once/mtn x 11 months. #20 doses total, Disp: 1 mL, Rfl: 19  •  FLUoxetine (PROZAC) 20 MG capsule, Take 1 capsule by mouth Daily., Disp: 30 capsule, Rfl: 5  •  glucose blood test strip, 1 each by Other route Daily., Disp: 50 each, Rfl: 11  •  glucose monitor monitoring kit, To test blood sugars once daily, Disp: 1 each, Rfl: 0  •  Omega-3 Fatty Acids (FISH OIL) 1000 MG capsule capsule, Take 1 capsule by mouth 3 (Three) Times a Day With Meals., Disp: 90 capsule, Rfl: 5  •  ONETOUCH DELICA LANCETS 33G misc, 1 application by Other route Daily., Disp: 50 each, Rfl: 11  •  glucose (B-D GLUCOSE) 5 g chewable tablet, Chew 3 tablets As Needed (for blood sugar below 60)., Disp: 50 tablet, Rfl: 5  •  glucose blood test strip, 1 each by Other route 3 (Three) Times a Day. Use as instructed, Disp: 100 each, Rfl: 11  •  hydrOXYzine (ATARAX) 25 MG tablet, take 1-2 tablets at bedtime for insomnia, Disp: 60 tablet, Rfl: 0  •  ONE TOUCH LANCETS misc, 1 each Daily., Disp: 100 each, Rfl: 11  •  Syringe/Needle, Disp, 22G X 1-1/2\" 3 ML misc, 1 syringe per Vit b12 injection, 1st mtn: 1/pguw6lczg, 1/wkx5wk, 1/mtnx11 mtns, Disp: 9 each, Rfl: 11      ABHISHEK Bowman        This document has been electronically signed by ABHISHEK Bowman on February 13, 2018 4:03 PM      EMR/Transcription Dragon Disclaimer:  Some of this note may be an " electronic dragon transcription/translation of spoken language to printed text. The electronic translation of spoken language may permit erroneous, or at times, nonsensical words or phrases to be inadvertently transcribed. Although I have reviewed the note for such errors, some may still exist.

## 2018-02-14 DIAGNOSIS — E53.8 VITAMIN B12 DEFICIENCY: ICD-10-CM

## 2018-02-14 LAB
ENDOMYSIUM IGA SER QL: NEGATIVE
IGA SERPL-MCNC: 119 MG/DL (ref 87–352)
TTG IGA SER-ACNC: <2 U/ML (ref 0–3)

## 2018-02-14 NOTE — TELEPHONE ENCOUNTER
Spoke to pt relayed results then pt had addiotnal questions:    About her referrals to Derm ?     Then no one has contact her for Endocrinology     And the B12 is making the pt crash out so tired 45 to 30 minutes after the shot.

## 2018-02-15 ENCOUNTER — PATIENT MESSAGE (OUTPATIENT)
Dept: FAMILY MEDICINE CLINIC | Facility: CLINIC | Age: 33
End: 2018-02-15

## 2018-02-15 NOTE — TELEPHONE ENCOUNTER
Spoke to Angelito's office. They never received the referral because it was faxed to the incorrect fax number. I got the correct number and sent it to them. They will contact the patient.

## 2018-02-15 NOTE — TELEPHONE ENCOUNTER
DEEPIKA, can you see why derm has not called her please and then let patient know or let me know if there is a problem. Betsy, when you talk to her, let her know her derm is [facility] in [place] because hes the only one that accepts medicaid.     Betsy:     Endocrine...she just needs to try them again, their office may have been closed that day, we can get in touch with them any easier than she can.     Celiac panel was normal.    The surgical clip in the xray is in her adexa on the right side and that is normally where they place a surgical clip during hysterectomy. If she is still seeing the doc who did the surgery she can call and ask if surgical clip or get me the date/dr and I will find out. I will review the records I have by Monday and get back to her on that.     Vit b12 should not make her tired, she may need to stop it. If it is just making her sleepy I wouldn't worry about it but severely sleepy or any other symptoms and she should stop the injections and just do PO vit b12

## 2018-02-15 NOTE — TELEPHONE ENCOUNTER
Spoke with pt relayed all results and instructions.    Pt is going to set up appt with Gina to go over some medical history to find out why pt isn't feeling good

## 2018-02-15 NOTE — TELEPHONE ENCOUNTER
Great thanks DEEPIKA Meyer, let jose antonio know that DEEPIKA got referral fixed and to let us know by mid next week if no call for derm.

## 2018-02-20 ENCOUNTER — TELEPHONE (OUTPATIENT)
Dept: FAMILY MEDICINE CLINIC | Facility: CLINIC | Age: 33
End: 2018-02-20

## 2018-02-20 NOTE — TELEPHONE ENCOUNTER
They did call her with results spoke to her about her visit and up coming appt and her son caitlin. We will address any issues in the office at the appt

## 2018-02-20 NOTE — TELEPHONE ENCOUNTER
----- Message from ABHISHEK Bowman sent at 2/19/2018  4:30 PM CST -----  Xray of right shoulder ordered by [facility] was negative in case they didn't let you know.

## 2018-02-22 ENCOUNTER — DOCUMENTATION (OUTPATIENT)
Dept: FAMILY MEDICINE CLINIC | Facility: CLINIC | Age: 33
End: 2018-02-22
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
## 2018-02-22 NOTE — PROGRESS NOTES
"Reviewed records from Dr. [facility].  Records summarized below.  Copies of these records can be found in media.    12/3/15 no show    Office visit 9/29/15: Patient states history of hysterectomy of uterus and cervix in 2011.  Patient currently on estradiol 0.375 mg patch.  Note from providers stating psych issues very complex and patient stating allergic to all psych medications, being seen by cici well in the past.  History of abnormal Pap on 5/12/15 showing LGSIL/CINI.  History of suicidal attempt, hypertension, depression, ADHD, and panic attacks.  Patient states \"has the ability to make portable electronic device batteries lose their charge\".     9/10/15 no-show    Office visit 8/13/15: Post op BSO.  Patient complaining of pain.  Patient had laparoscopic BSO recently and is complaining of much pain for right lower quadrant.  Patient states that ER after surgery told her that she had a hematoma.  Patient also complaining of hot flashes and decreased libido.  Pelvic exam of cervix showed post hysterectomy and vaginal cuff intact.  Patient put on Vivelle/Dot 0.1 mg/24 hours and Estrace.    Office visit 5/12/15: Patient here for new annual visit, patient states she has not been seen since 2011.  Pt complaining of cyst on ovaries.  Patient states that she has been to 13 ER visits for this problem.  History of laparoscopic hysterectomy of ovaries and cervix in 2011, tubal ligation in 2009.  Exam normal except right ovary tenderness and right lower quadrant tenderness of the abdomen.  Diagnosis of pelvic pain, patient currently on Depo shot.  Patient scheduled for laparoscopic BSO, Pap and STD testing done during this visit.      Office visit 7/17/15: Patient here for procedure of laparoscopy me exam with BSO.  Results showed benign ovaries with cystic follicles and endometriosis.  GNC negative.  Negative for intraepithelial lesions and malignancy.    "

## 2018-02-27 ENCOUNTER — TELEPHONE (OUTPATIENT)
Dept: FAMILY MEDICINE CLINIC | Facility: CLINIC | Age: 33
End: 2018-02-27

## 2018-02-27 DIAGNOSIS — R10.2 PELVIC PAIN: Primary | ICD-10-CM

## 2018-03-08 ENCOUNTER — TELEPHONE (OUTPATIENT)
Dept: OBSTETRICS AND GYNECOLOGY | Facility: CLINIC | Age: 33
End: 2018-03-08

## 2018-03-08 NOTE — TELEPHONE ENCOUNTER
----- Message from Lizz Garcia sent at 3/8/2018 11:04 AM CST -----  Regarding: new gyn  Contact: [phone]  Referral nan Whittaker...foreign object       Friday spoke with pt.

## 2018-03-16 DIAGNOSIS — J40 BRONCHITIS: Primary | ICD-10-CM

## 2018-03-16 RX ORDER — CIPROFLOXACIN 500 MG/1
500 TABLET, FILM COATED ORAL 2 TIMES DAILY
Qty: 14 TABLET | Refills: 0 | Status: SHIPPED | OUTPATIENT
Start: 2018-03-16 | End: 2018-03-26

## 2018-03-16 RX ORDER — DEXTROMETHORPHAN HYDROBROMIDE AND PROMETHAZINE HYDROCHLORIDE 15; 6.25 MG/5ML; MG/5ML
5 SYRUP ORAL 4 TIMES DAILY PRN
Qty: 240 ML | Refills: 0 | Status: SHIPPED | OUTPATIENT
Start: 2018-03-16 | End: 2018-06-20

## 2018-03-16 RX ORDER — GUAIFENESIN 600 MG/1
1200 TABLET, EXTENDED RELEASE ORAL 2 TIMES DAILY
Qty: 40 TABLET | Refills: 0 | Status: SHIPPED | OUTPATIENT
Start: 2018-03-16 | End: 2018-06-20

## 2018-03-19 ENCOUNTER — OFFICE VISIT (OUTPATIENT)
Dept: CARDIOLOGY | Facility: CLINIC | Age: 33
End: 2018-03-19

## 2018-03-19 VITALS
HEART RATE: 88 BPM | BODY MASS INDEX: 40.04 KG/M2 | DIASTOLIC BLOOD PRESSURE: 88 MMHG | HEIGHT: 63 IN | WEIGHT: 226 LBS | SYSTOLIC BLOOD PRESSURE: 126 MMHG

## 2018-03-19 DIAGNOSIS — R07.89 OTHER CHEST PAIN: ICD-10-CM

## 2018-03-19 DIAGNOSIS — R06.02 SOB (SHORTNESS OF BREATH): ICD-10-CM

## 2018-03-19 DIAGNOSIS — I73.9 PERIPHERAL ARTERIAL DISEASE (HCC): ICD-10-CM

## 2018-03-19 DIAGNOSIS — E11.9 TYPE 2 DIABETES MELLITUS WITHOUT COMPLICATION, WITHOUT LONG-TERM CURRENT USE OF INSULIN (HCC): Primary | ICD-10-CM

## 2018-03-19 PROCEDURE — 93000 ELECTROCARDIOGRAM COMPLETE: CPT | Performed by: INTERNAL MEDICINE

## 2018-03-19 PROCEDURE — 99203 OFFICE O/P NEW LOW 30 MIN: CPT | Performed by: INTERNAL MEDICINE

## 2018-03-19 NOTE — PROGRESS NOTES
[facility]  OFFICE NOTE    Veronica Hicks  32 y.o. female    03/19/2018  1. Type 2 diabetes mellitus without complication, without long-term current use of insulin    2. Peripheral arterial disease    3. Other chest pain    4. SOB (shortness of breath)        Chief complaint -Chest pain and short of breath      History of present Illness- 32-year-old lady with a BMI of 40, was a smoker before now using water vapour for her tobacco addiction without nicotine, has been having chest pain atypical in nature.  She is a diabetic on diet control.  She is not on any statins or cholesterol medicine she had some peripheral vascular disease was had a LEONID which was managed being conservatively on Pletal.  And was seen in the vascular clinic.  Talked to her about diet and exercise.  She is on antidepressants              Allergies   Allergen Reactions   • Penicillins Anaphylaxis   • Tomato Anaphylaxis   • Azithromycin Hives   • Bactrim [Sulfamethoxazole-Trimethoprim] Hives   • Cherry Flavor Swelling   • Zoloft [Sertraline Hcl] Hives   • Codeine      Jittery feeling    • Erythromycin Rash   • Keflex [Cephalexin] Rash         Past Medical History:   Diagnosis Date   • Abdominal pain    • Abnormal Pap smear of cervix    • Acute bronchitis    • Acute left otitis media    • Ankle pain    • Anxiety    • Asthma    • Attention deficit hyperactivity disorder    • Bipolar disorder    • Candidiasis    • Candidiasis of vagina    • Depression    • Diabetes mellitus    • Diarrhea    • Dizziness    • Dysuria    • Elbow joint pain     elbow joint - painful on movement   • Elbow joint pain    • Elevated blood pressure    • Encounter for long-term (current) use of medications     Long-term (current) use of other medications    • Epigastric pain    • Excessive thirst    • Fall    • Fatigue    • Feeling tired     tired all the time   • Flank pain    • Gastroesophageal reflux disease    • High risk sexual behavior    •  History of malignant neoplasm of cervix    • Hordeolum internum right upper eyelid    • HPV (human papilloma virus) infection    • Hx of blood clots    • Hypokalemia    • Increased frequency of urination    • Infection of skin and subcutaneous tissue    • Irritable bowel syndrome    • Knee pain, left    • Low back pain    • Muscle weakness    • Nausea and vomiting    • Pain in limb    • Pain in wrist    • Peripheral arterial disease    • Postartificial menopausal syndrome    • Postmenopausal state    • Serous otitis media    • Skin lesion    • Smokes tobacco daily    • Upper respiratory infection    • Urinary tract infection    • Vaginitis          Past Surgical History:   Procedure Laterality Date   • APPENDECTOMY     • INJECTION OF MEDICATION  04/17/2015    depo medrol 80mg   • LEG SURGERY     • OOPHORECTOMY  07/22/2015    bilatral, for pain and recurrent ovarian cysts   • ORIF ULNA/RADIUS FRACTURES      treat radius/ulna fracutre-2; no pins, fracture L fa   • PAP SMEAR  09/2012   • TOTAL ABDOMINAL HYSTERECTOMY      w bso   • TUBAL ABDOMINAL LIGATION           Family History   Problem Relation Age of Onset   • ADD / ADHD Daughter    • Lung cancer Maternal Grandmother    • Cancer Paternal Grandmother    • Uterine cancer Paternal Grandmother    • Colon cancer Paternal Grandmother    • Diabetes Other    • Liver cancer Other    • Breast cancer Mother    • Ovarian cancer Mother    • Birth defects Brother    • Stroke Paternal Grandfather    • Endometrial cancer Neg Hx          Social History     Social History   • Marital status: Single     Spouse name: N/A   • Number of children: N/A   • Years of education: N/A     Occupational History   • disabled      Social History Main Topics   • Smoking status: Current Every Day Smoker     Types: Electronic Cigarette   • Smokeless tobacco: Never Used      Comment: Pt states she vapes   • Alcohol use No   • Drug use: No   • Sexual activity: Yes     Partners: Male     Birth control/  protection: Surgical     Other Topics Concern   • Not on file     Social History Narrative   • No narrative on file         Current Outpatient Prescriptions   Medication Sig Dispense Refill   • albuterol (PROVENTIL HFA;VENTOLIN HFA) 108 (90 Base) MCG/ACT inhaler Inhale 2 puffs Every 6 (Six) Hours As Needed for Wheezing or Shortness of Air. 1 inhaler 3   • aspirin 81 MG EC tablet Take 1 tablet by mouth Daily. For PAD 30 tablet 5   • Blood Glucose Monitoring Suppl (ONE TOUCH ULTRA MINI) W/DEVICE kit   0   • Calcium Carb-Cholecalciferol (CALCIUM-VITAMIN D) 500-200 MG-UNIT per tablet Take 1 tablet by mouth 2 (Two) Times a Day With Meals. 60 tablet 5   • cilostazol (PLETAL) 100 MG tablet Take 1 tablet by mouth Daily. For PAD 30 tablet 5   • ciprofloxacin (CIPRO) 500 MG tablet Take 1 tablet by mouth 2 (Two) Times a Day for 10 days. 14 tablet 0   • cyanocobalamin 1000 MCG/ML injection 1,000mcg/1mL Vitamin B12 IM, every day x5days, every week [id number], then once/mtn x 11 months. #20 doses total 1 mL 19   • FLUoxetine (PROZAC) 20 MG capsule Take 1 capsule by mouth Daily. 30 capsule 5   • glucose (B-D GLUCOSE) 5 g chewable tablet Chew 3 tablets As Needed (for blood sugar below 60). 50 tablet 5   • glucose blood test strip 1 each by Other route Daily. 50 each 11   • glucose blood test strip 1 each by Other route 3 (Three) Times a Day. Use as instructed 100 each 11   • glucose monitor monitoring kit To test blood sugars once daily 1 each 0   • guaiFENesin (MUCINEX) 600 MG 12 hr tablet Take 2 tablets by mouth 2 (Two) Times a Day. 40 tablet 0   • ibuprofen (ADVIL,MOTRIN) 600 MG tablet Take 1 tablet by mouth Every 8 (Eight) Hours As Needed for Mild Pain . 30 tablet 0   • Omega-3 Fatty Acids (FISH OIL) 1000 MG capsule capsule Take 1 capsule by mouth 3 (Three) Times a Day With Meals. 90 capsule 5   • ONE TOUCH LANCETS misc 1 each Daily. 100 each 11   • ONETOUCH DELICA LANCETS 33G misc 1 application by Other route Daily. 50 each 11  "  • promethazine-dextromethorphan (PROMETHAZINE-DM) 6.25-15 MG/5ML syrup Take 5 mL by mouth 4 (Four) Times a Day As Needed (cough and nausea). Do not drive with this medication 240 mL 0   • Syringe/Needle, Disp, 22G X 1-1/2\" 3 ML misc 1 syringe per Vit b12 injection, 1st mtn: 1/aczh2ycua, 1/wkx5wk, 1/mtnx11 mtns 9 each 11   • hydrOXYzine (ATARAX) 25 MG tablet take 1-2 tablets at bedtime for insomnia 60 tablet 0     No current facility-administered medications for this visit.          Review of Systems     Constitution: Denies any fatigue, fever or chills    HENT: Denies any headache, hearing impairment,     Eyes: Denies any blurring of vision, or photophobia     Cardivascular - As per history of present illness     Respiratory system-denies any COPD, shortness of breath,   sleep apnea.     Endocrine:  No history of hyperlipidemia, diabetes,                      Hypothyrodism       Musculoskeletal:   history of  musculoskeletal problems    Gastrointestinal: No nausea, vomiting, or melena    Genitourinary: No dysuria or hematuria    Neurological:   No history of seizure disorder, stroke, memory problems    Psychiatric/Behavioral:         history of depression    Hematological- no history of easy bruising or any bleeding diathesis            OBJECTIVE    /88   Pulse 88   Ht 160 cm (62.99\")   Wt 103 kg (226 lb)   BMI 40.04 kg/m²       Physical Exam     Constitutional: is oriented to person, place, and time.     Skin-warm and dry    Well developed and nourished in no acute distress      Head: Normocephalic and atraumatic.     Eyes: Pupils are equal    Neck: Neck supple. No bruit in the carotids    Cardiovascular: [place] in the fifth intercostal space   Regular rate, and  Rhythm,    S1 greater than S2, no S3 or S4, no gallop     Pulmonary/Chest:   Air  Entry is equal on both sides  No wheezing or crackles,      Abdominal: Soft.  No hepatosplenomegaly, bowel sounds are present    Musculoskeletal: No kyphoscoliosis, " no significant thickening of the joints    Neurological: is alert and oriented to person, place, and time.    cranial nerve are intact .   No motor or sensory deficit    Extremities-no edema, no radial femoral delay      Psychiatric: He has a normal mood and affect.                  His behavior is normal.             ECG 12 Lead  Date/Time: 3/19/2018 12:04 PM  Performed by: MARY LOZADA  Authorized by: MARY LOZADA   Comparison: not compared with previous ECG   Rhythm: sinus rhythm  Clinical impression: non-specific ECG              Lab Results   Component Value Date    WBC 8.32 02/01/2018    HGB 14.1 02/01/2018    HCT 43.5 02/01/2018    MCV 80.4 02/01/2018     02/01/2018     Lab Results   Component Value Date    GLUCOSE 89 11/16/2017    BUN 14 11/16/2017    CREATININE 0.90 11/16/2017    EGFRIFNONA 74 11/16/2017    BCR 15.6 11/16/2017    CO2 27.0 11/16/2017    CALCIUM 9.6 11/16/2017    ALBUMIN 4.30 11/16/2017    LABIL2 1.2 11/16/2017    AST 22 11/16/2017    ALT 30 11/16/2017     Lab Results   Component Value Date    CHOL 211 (H) 11/16/2017     Lab Results   Component Value Date    TRIG 125 11/16/2017    TRIG 95 12/16/2014     Lab Results   Component Value Date    HDL 52 11/16/2017    HDL 48.0 12/16/2014     No components found for: LDLCALC  Lab Results   Component Value Date     11/16/2017     12/16/2014     No results found for: HDLLDLRATIO  No components found for: CHOLHDL  Lab Results   Component Value Date    HGBA1C 5.5 12/18/2017     Lab Results   Component Value Date    TSH 1.260 11/16/2017                  A/P  Chest pain with shortness of breath-multifactorial with the morbid obesity  BMI of 40 , on Pletal for peripheral vascular disease, will do exercise treadmill and will get an echo to assess LV function and valvular function and if they are okay she does not need any further cardiac workup.    Former smoker currently using water vapor.    Borderline diabetes on diet  control, he again needs weight reduction and getting her BMI down to less than 25 and her current BMI is 40.    If the treadmill and echo is negative she does not need any further cardiac workup.                This document has been electronically signed by Gianluca Ham MD on March 19, 2018 12:01 PM       EMR Dragon/Transcription disclaimer:   Some of this note may be an electronic transcription/translation of spoken language to printed text. The electronic translation of spoken language may permit erroneous, or at times, nonsensical words or phrases to be inadvertently transcribed; Although I have reviewed the note for such errors, some may still exist.

## 2018-05-14 ENCOUNTER — APPOINTMENT (OUTPATIENT)
Dept: CARDIOLOGY | Facility: HOSPITAL | Age: 33
End: 2018-05-14
Attending: INTERNAL MEDICINE

## 2018-05-29 RX ORDER — DOXYCYCLINE HYCLATE 100 MG/1
100 CAPSULE ORAL 2 TIMES DAILY
Qty: 14 CAPSULE | Refills: 0 | Status: SHIPPED | OUTPATIENT
Start: 2018-05-29 | End: 2018-06-05

## 2018-05-29 NOTE — PROGRESS NOTES
Pt has had symptoms x2weeks, no fever but pt usually doesn't run a fever, soreness of right side of tonsils then left side, swollen tonsillar lymph nodes, sore throat. Warm salt water gargles and doxycycline bid x 7days.

## 2018-06-20 ENCOUNTER — OFFICE VISIT (OUTPATIENT)
Dept: FAMILY MEDICINE CLINIC | Facility: CLINIC | Age: 33
End: 2018-06-20

## 2018-06-20 VITALS
HEART RATE: 69 BPM | SYSTOLIC BLOOD PRESSURE: 116 MMHG | BODY MASS INDEX: 37.03 KG/M2 | WEIGHT: 209 LBS | HEIGHT: 63 IN | OXYGEN SATURATION: 98 % | DIASTOLIC BLOOD PRESSURE: 70 MMHG | TEMPERATURE: 98.5 F | RESPIRATION RATE: 16 BRPM

## 2018-06-20 DIAGNOSIS — J30.1 CHRONIC ALLERGIC RHINITIS DUE TO POLLEN, UNSPECIFIED SEASONALITY: Primary | ICD-10-CM

## 2018-06-20 DIAGNOSIS — Z13.1 SCREENING FOR DIABETES MELLITUS: ICD-10-CM

## 2018-06-20 DIAGNOSIS — Z13.0 SCREENING FOR DEFICIENCY ANEMIA: ICD-10-CM

## 2018-06-20 DIAGNOSIS — M25.562 CHRONIC PAIN OF LEFT KNEE: ICD-10-CM

## 2018-06-20 DIAGNOSIS — F32.A DEPRESSION, UNSPECIFIED DEPRESSION TYPE: ICD-10-CM

## 2018-06-20 DIAGNOSIS — R73.9 HYPERGLYCEMIA: ICD-10-CM

## 2018-06-20 DIAGNOSIS — E55.9 VITAMIN D DEFICIENCY: ICD-10-CM

## 2018-06-20 DIAGNOSIS — G89.29 CHRONIC PAIN OF LEFT KNEE: ICD-10-CM

## 2018-06-20 DIAGNOSIS — E53.8 VITAMIN B12 DEFICIENCY: ICD-10-CM

## 2018-06-20 DIAGNOSIS — R26.81 UNSTABLE GAIT: ICD-10-CM

## 2018-06-20 DIAGNOSIS — Z13.220 SCREENING FOR HYPERLIPIDEMIA: ICD-10-CM

## 2018-06-20 DIAGNOSIS — Z13.29 SCREENING FOR THYROID DISORDER: ICD-10-CM

## 2018-06-20 PROCEDURE — 99214 OFFICE O/P EST MOD 30 MIN: CPT | Performed by: NURSE PRACTITIONER

## 2018-06-20 RX ORDER — FLUTICASONE PROPIONATE 50 MCG
2 SPRAY, SUSPENSION (ML) NASAL DAILY
Qty: 16 G | Refills: 5 | Status: SHIPPED | OUTPATIENT
Start: 2018-06-20 | End: 2019-03-15 | Stop reason: SDUPTHER

## 2018-06-20 RX ORDER — LORATADINE 10 MG/1
10 TABLET ORAL DAILY
Qty: 30 TABLET | Refills: 5 | Status: SHIPPED | OUTPATIENT
Start: 2018-06-20 | End: 2019-03-15 | Stop reason: SDUPTHER

## 2018-06-20 RX ORDER — FLUOXETINE 10 MG/1
30 CAPSULE ORAL NIGHTLY
Qty: 90 CAPSULE | Refills: 5 | Status: SHIPPED | OUTPATIENT
Start: 2018-06-20 | End: 2019-02-22

## 2018-07-07 NOTE — PROGRESS NOTES
Subjective   Veronica Hicks is a 32 y.o. female who presents to the office for med refills.     History of Present Illness     Pt states she had no medical insurance and that is why she has not been f/u.     Depression-chronic, uncontrolled on prozac 20mg  -pt states a little worse, takes at nighttime because it makes her gladys sleepy    Chronic AR-controlled with flonase and claritin    Vit d low-chronic, conrolled with calcium/vit d bid    Chronic pain of left knee causing unstable gait for which she uses a cane. Pt broke her cane, needs script for new cane.     The following portions of the patient's history were reviewed and updated as appropriate: allergies, current medications, past family history, past medical history, past social history, past surgical history and problem list.    Review of Systems   Constitutional: Negative for activity change, appetite change, chills, diaphoresis, fatigue, fever and unexpected weight change.   HENT: Negative for congestion, ear discharge, ear pain, nosebleeds, postnasal drip, rhinorrhea, sinus pain, sinus pressure, sneezing, sore throat, tinnitus and trouble swallowing.    Eyes: Negative.    Respiratory: Negative for cough, chest tightness, shortness of breath and wheezing.    Cardiovascular: Negative for chest pain, palpitations and leg swelling.   Gastrointestinal: Negative for abdominal distention, abdominal pain, blood in stool, constipation, diarrhea, nausea and vomiting.   Genitourinary: Negative for difficulty urinating, dyspareunia, dysuria, flank pain, frequency, hematuria, menstrual problem, vaginal bleeding, vaginal discharge and vaginal pain.   Musculoskeletal: Positive for arthralgias and gait problem. Negative for back pain, joint swelling and myalgias.   Skin: Negative for rash and wound.   Allergic/Immunologic: Negative for environmental allergies, food allergies and immunocompromised state.   Neurological: Negative for dizziness, tremors, seizures,  syncope, weakness, light-headedness, numbness and headaches.   Hematological: Does not bruise/bleed easily.   Psychiatric/Behavioral: Positive for sleep disturbance. Negative for behavioral problems, self-injury and suicidal ideas. The patient is nervous/anxious.        Past Medical History:   Diagnosis Date   • Abdominal pain    • Abnormal Pap smear of cervix    • Acute bronchitis    • Acute left otitis media    • Ankle pain    • Anxiety    • Asthma    • Attention deficit hyperactivity disorder    • Bipolar disorder (CMS/HCC)    • Candidiasis    • Candidiasis of vagina    • Depression    • Diabetes mellitus (CMS/HCC)    • Diarrhea    • Dizziness    • Dysuria    • Elbow joint pain     elbow joint - painful on movement   • Elbow joint pain    • Elevated blood pressure    • Encounter for long-term (current) use of medications     Long-term (current) use of other medications    • Epigastric pain    • Excessive thirst    • Fall    • Fatigue    • Feeling tired     tired all the time   • Flank pain    • Gastroesophageal reflux disease    • High risk sexual behavior    • History of malignant neoplasm of cervix    • Hordeolum internum right upper eyelid    • HPV (human papilloma virus) infection    • Hx of blood clots    • Hypokalemia    • Increased frequency of urination    • Infection of skin and subcutaneous tissue    • Irritable bowel syndrome    • Knee pain, left    • Low back pain    • Muscle weakness    • Nausea and vomiting    • Pain in limb    • Pain in wrist    • Peripheral arterial disease (CMS/HCC)    • Postartificial menopausal syndrome    • Postmenopausal state    • Serous otitis media    • Skin lesion    • Smokes tobacco daily    • Upper respiratory infection    • Urinary tract infection    • Vaginitis        Family History   Problem Relation Age of Onset   • ADD / ADHD Daughter    • Lung cancer Maternal Grandmother    • Cancer Paternal Grandmother    • Uterine cancer Paternal Grandmother    • Colon cancer  "Paternal Grandmother    • Diabetes Other    • Liver cancer Other    • Breast cancer Mother    • Ovarian cancer Mother    • Birth defects Brother    • Stroke Paternal Grandfather    • Endometrial cancer Neg Hx           Objective   /70   Pulse 69   Temp 98.5 °F (36.9 °C) (Temporal Artery )   Resp 16   Ht 160 cm (63\")   Wt 94.8 kg (209 lb)   SpO2 98%   Breastfeeding? No   BMI 37.02 kg/m²   Physical Exam   Constitutional: She is oriented to person, place, and time. She appears well-developed and well-nourished. She is cooperative. She does not appear ill.   HENT:   Head: Normocephalic.   Right Ear: Hearing, tympanic membrane, external ear and ear canal normal.   Left Ear: Hearing, tympanic membrane, external ear and ear canal normal.   Nose: Nose normal.   Mouth/Throat: Oropharynx is clear and moist. No oropharyngeal exudate.   Eyes: EOM and lids are normal. Pupils are equal, round, and reactive to light. Right eye exhibits no discharge. Left eye exhibits no discharge. Right conjunctiva is not injected. Left conjunctiva is not injected.   Neck: Normal range of motion. Neck supple.   Cardiovascular: Normal rate, regular rhythm, normal heart sounds and intact distal pulses.  Exam reveals no gallop and no friction rub.    No murmur heard.  Pulmonary/Chest: Effort normal and breath sounds normal. No respiratory distress. She has no wheezes. She has no rales.   Abdominal: Soft. Normal appearance, normal aorta and bowel sounds are normal. She exhibits no distension and no mass. There is no tenderness. There is no rebound and no guarding. No hernia.   Musculoskeletal: She exhibits no edema or deformity.        Left knee: She exhibits decreased range of motion. Tenderness found.   Lymphadenopathy:     She has no cervical adenopathy.   Neurological: She is alert and oriented to person, place, and time. She has normal strength and normal reflexes. She displays normal reflexes. No cranial nerve deficit or sensory " deficit. She exhibits normal muscle tone. She displays a negative Romberg sign. Coordination normal.   Reflex Scores:       Patellar reflexes are 2+ on the right side and 2+ on the left side.  Wobbling gait at times due to left knee pain.    Skin: Skin is warm, dry and intact. No rash noted. She is not diaphoretic. No erythema. No pallor.   Psychiatric: She has a normal mood and affect. Her speech is normal and behavior is normal. Thought content normal. Cognition and memory are normal.   Patient is dressed appropriately for weather and situation, makes eye contact, and engages in conversation.  She is attentive.   Nursing note and vitals reviewed.       PHQ-2/PHQ-9 Depression Screening 6/20/2018   Little interest or pleasure in doing things 1   Feeling down, depressed, or hopeless 1   Trouble falling or staying asleep, or sleeping too much 3   Feeling tired or having little energy 3   Poor appetite or overeating 0   Feeling bad about yourself - or that you are a failure or have let yourself or your family down 3   Trouble concentrating on things, such as reading the newspaper or watching television 2   Moving or speaking so slowly that other people could have noticed. Or the opposite - being so fidgety or restless that you have been moving around a lot more than usual 3   Thoughts that you would be better off dead, or of hurting yourself in some way 0   Total Score 16   If you checked off any problems, how difficult have these problems made it for you to do your work, take care of things at home, or get along with other people? Somewhat difficult         Assessment/Plan   Veronica was seen today for med refill.    Diagnoses and all orders for this visit:    Chronic allergic rhinitis due to pollen, unspecified seasonality  -     loratadine (CLARITIN) 10 MG tablet; Take 1 tablet by mouth Daily.  -     fluticasone (FLONASE) 50 MCG/ACT nasal spray; 2 sprays into each nostril Daily. Administer 2 sprays in each nostril  for each dose daily.    Vitamin D deficiency  -     Calcium Carb-Cholecalciferol (CALCIUM-VITAMIN D) 500-200 MG-UNIT per tablet; Take 1 tablet by mouth 2 (Two) Times a Day With Meals.    Depression, unspecified depression type  -     FLUoxetine (PROzac) 10 MG capsule; Take 3 capsules by mouth Every Night.  -     Vitamin D 25 Hydroxy; Future    Chronic pain of left knee    Unstable gait    Vitamin B12 deficiency  -     Vitamin B12; Future    Screening for hyperlipidemia  -     Lipid Panel; Future    Screening for thyroid disorder  -     TSH; Future  -     T4, Free; Future    Screening for deficiency anemia  -     CBC & Differential; Future    Screening for diabetes mellitus  -     Comprehensive Metabolic Panel; Future    Hyperglycemia  -     Hemoglobin A1c; Future           Depression-chronic, uncontrolled on prozac 20mg  -pt sensitive to prozac so increase to 30mg daily    Chronic AR-controlled with flonase and claritin    Vit d low-chronic, conrolled with calcium/vit d bid    Chronic pain of left knee causing unstable gait for which she uses a cane. Wrote script for new left sided four pong cane.     Labs mid august    Patient educated to follow-up sooner than next scheduled appointment if condition(s) worse or do not improve. Patient states understanding and is in agreeance with plan of care. An After Visit Summary was printed and given to the patient.      ABHISHEK Bowman        This document has been electronically signed by ABHISHEK Bowman on July 7, 2018 12:03 AM      EMR/Transcription Dragon Disclaimer:  Some of this note may be an electronic dragon transcription/translation of spoken language to printed text. The electronic translation of spoken language may permit erroneous, or at times, nonsensical words or phrases to be inadvertently transcribed. Although I have reviewed the note for such errors, some may still exist.

## 2018-07-23 DIAGNOSIS — R60.9 PERIPHERAL EDEMA: Primary | ICD-10-CM

## 2018-07-23 DIAGNOSIS — K04.7 TOOTH ABSCESS: ICD-10-CM

## 2018-07-23 RX ORDER — HYDROCHLOROTHIAZIDE 12.5 MG/1
12.5 TABLET ORAL 2 TIMES DAILY
Qty: 60 TABLET | Refills: 0 | Status: SHIPPED | OUTPATIENT
Start: 2018-07-23 | End: 2018-09-04 | Stop reason: SDUPTHER

## 2018-07-23 RX ORDER — LEVOFLOXACIN 500 MG/1
500 TABLET, FILM COATED ORAL DAILY
Qty: 7 TABLET | Refills: 0 | Status: SHIPPED | OUTPATIENT
Start: 2018-07-23 | End: 2018-08-28

## 2018-08-28 ENCOUNTER — OFFICE VISIT (OUTPATIENT)
Dept: FAMILY MEDICINE CLINIC | Facility: CLINIC | Age: 33
End: 2018-08-28

## 2018-08-28 ENCOUNTER — TELEPHONE (OUTPATIENT)
Dept: FAMILY MEDICINE CLINIC | Facility: CLINIC | Age: 33
End: 2018-08-28

## 2018-08-28 ENCOUNTER — LAB (OUTPATIENT)
Dept: LAB | Facility: OTHER | Age: 33
End: 2018-08-28

## 2018-08-28 VITALS
RESPIRATION RATE: 18 BRPM | TEMPERATURE: 100.5 F | OXYGEN SATURATION: 99 % | BODY MASS INDEX: 36.14 KG/M2 | HEART RATE: 68 BPM | SYSTOLIC BLOOD PRESSURE: 110 MMHG | DIASTOLIC BLOOD PRESSURE: 70 MMHG | WEIGHT: 204 LBS | HEIGHT: 63 IN

## 2018-08-28 DIAGNOSIS — E53.8 VITAMIN B12 DEFICIENCY: ICD-10-CM

## 2018-08-28 DIAGNOSIS — S59.911A RIGHT FOREARM INJURY, INITIAL ENCOUNTER: ICD-10-CM

## 2018-08-28 DIAGNOSIS — E53.8 VITAMIN B 12 DEFICIENCY: Primary | ICD-10-CM

## 2018-08-28 DIAGNOSIS — Z13.29 SCREENING FOR THYROID DISORDER: ICD-10-CM

## 2018-08-28 DIAGNOSIS — S69.91XA INJURY OF RIGHT WRIST, INITIAL ENCOUNTER: ICD-10-CM

## 2018-08-28 DIAGNOSIS — I73.9 PAD (PERIPHERAL ARTERY DISEASE) (HCC): ICD-10-CM

## 2018-08-28 DIAGNOSIS — F32.A DEPRESSION, UNSPECIFIED DEPRESSION TYPE: ICD-10-CM

## 2018-08-28 DIAGNOSIS — Z13.0 SCREENING FOR DEFICIENCY ANEMIA: ICD-10-CM

## 2018-08-28 DIAGNOSIS — M25.531 RIGHT WRIST PAIN: ICD-10-CM

## 2018-08-28 DIAGNOSIS — Z13.220 SCREENING FOR HYPERLIPIDEMIA: ICD-10-CM

## 2018-08-28 DIAGNOSIS — J40 BRONCHITIS: ICD-10-CM

## 2018-08-28 DIAGNOSIS — E55.9 VITAMIN D DEFICIENCY: ICD-10-CM

## 2018-08-28 DIAGNOSIS — M79.631 RIGHT FOREARM PAIN: ICD-10-CM

## 2018-08-28 DIAGNOSIS — Z13.1 SCREENING FOR DIABETES MELLITUS: ICD-10-CM

## 2018-08-28 DIAGNOSIS — R73.9 HYPERGLYCEMIA: ICD-10-CM

## 2018-08-28 LAB
25(OH)D3 SERPL-MCNC: 21.1 NG/ML (ref 30–100)
ALBUMIN SERPL-MCNC: 4.5 G/DL (ref 3.5–5)
ALBUMIN/GLOB SERPL: 1.2 G/DL (ref 1.1–1.8)
ALP SERPL-CCNC: 85 U/L (ref 38–126)
ALT SERPL W P-5'-P-CCNC: 31 U/L
ANION GAP SERPL CALCULATED.3IONS-SCNC: 9 MMOL/L (ref 5–15)
AST SERPL-CCNC: 27 U/L (ref 14–36)
BASOPHILS # BLD AUTO: 0.03 10*3/MM3 (ref 0–0.2)
BASOPHILS NFR BLD AUTO: 0.3 % (ref 0–2)
BILIRUB SERPL-MCNC: 0.9 MG/DL (ref 0.2–1.3)
BUN BLD-MCNC: 11 MG/DL (ref 7–17)
BUN/CREAT SERPL: 12 (ref 7–25)
CALCIUM SPEC-SCNC: 9.7 MG/DL (ref 8.4–10.2)
CHLORIDE SERPL-SCNC: 102 MMOL/L (ref 98–107)
CHOLEST SERPL-MCNC: 183 MG/DL (ref 150–200)
CO2 SERPL-SCNC: 29 MMOL/L (ref 22–30)
CREAT BLD-MCNC: 0.92 MG/DL (ref 0.52–1.04)
DEPRECATED RDW RBC AUTO: 41 FL (ref 36.4–46.3)
EOSINOPHIL # BLD AUTO: 0.57 10*3/MM3 (ref 0–0.7)
EOSINOPHIL NFR BLD AUTO: 5.9 % (ref 0–7)
ERYTHROCYTE [DISTWIDTH] IN BLOOD BY AUTOMATED COUNT: 14.1 % (ref 11.5–14.5)
GFR SERPL CREATININE-BSD FRML MDRD: 72 ML/MIN/1.73 (ref 64–149)
GLOBULIN UR ELPH-MCNC: 3.8 GM/DL (ref 2.3–3.5)
GLUCOSE BLD-MCNC: 99 MG/DL (ref 74–99)
HBA1C MFR BLD: 5.4 % (ref 4–5.6)
HCT VFR BLD AUTO: 44.3 % (ref 35–45)
HDLC SERPL-MCNC: 44 MG/DL (ref 40–59)
HGB BLD-MCNC: 14.6 G/DL (ref 12–15.5)
LDLC SERPL CALC-MCNC: 113 MG/DL
LDLC/HDLC SERPL: 2.58 {RATIO} (ref 0–3.22)
LYMPHOCYTES # BLD AUTO: 1.79 10*3/MM3 (ref 0.6–4.2)
LYMPHOCYTES NFR BLD AUTO: 18.7 % (ref 10–50)
MCH RBC QN AUTO: 26.7 PG (ref 26.5–34)
MCHC RBC AUTO-ENTMCNC: 33 G/DL (ref 31.4–36)
MCV RBC AUTO: 81.1 FL (ref 80–98)
MONOCYTES # BLD AUTO: 0.76 10*3/MM3 (ref 0–0.9)
MONOCYTES NFR BLD AUTO: 7.9 % (ref 0–12)
NEUTROPHILS # BLD AUTO: 6.44 10*3/MM3 (ref 2–8.6)
NEUTROPHILS NFR BLD AUTO: 67.2 % (ref 37–80)
PLATELET # BLD AUTO: 272 10*3/MM3 (ref 150–450)
PMV BLD AUTO: 8.9 FL (ref 8–12)
POTASSIUM BLD-SCNC: 3.1 MMOL/L (ref 3.4–5)
PROT SERPL-MCNC: 8.3 G/DL (ref 6.3–8.2)
RBC # BLD AUTO: 5.46 10*6/MM3 (ref 3.77–5.16)
SODIUM BLD-SCNC: 140 MMOL/L (ref 137–145)
T4 FREE SERPL-MCNC: 1.23 NG/DL (ref 0.78–2.19)
TRIGL SERPL-MCNC: 128 MG/DL
TSH SERPL DL<=0.05 MIU/L-ACNC: 2.81 MIU/ML (ref 0.46–4.68)
VIT B12 BLD-MCNC: 454 PG/ML (ref 239–931)
VLDLC SERPL-MCNC: 25.6 MG/DL
WBC NRBC COR # BLD: 9.59 10*3/MM3 (ref 3.2–9.8)

## 2018-08-28 PROCEDURE — 85025 COMPLETE CBC W/AUTO DIFF WBC: CPT | Performed by: NURSE PRACTITIONER

## 2018-08-28 PROCEDURE — 84439 ASSAY OF FREE THYROXINE: CPT | Performed by: NURSE PRACTITIONER

## 2018-08-28 PROCEDURE — 80053 COMPREHEN METABOLIC PANEL: CPT | Performed by: NURSE PRACTITIONER

## 2018-08-28 PROCEDURE — 84443 ASSAY THYROID STIM HORMONE: CPT | Performed by: NURSE PRACTITIONER

## 2018-08-28 PROCEDURE — 82306 VITAMIN D 25 HYDROXY: CPT | Performed by: NURSE PRACTITIONER

## 2018-08-28 PROCEDURE — 99214 OFFICE O/P EST MOD 30 MIN: CPT | Performed by: NURSE PRACTITIONER

## 2018-08-28 PROCEDURE — 80061 LIPID PANEL: CPT | Performed by: NURSE PRACTITIONER

## 2018-08-28 PROCEDURE — 82607 VITAMIN B-12: CPT | Performed by: NURSE PRACTITIONER

## 2018-08-28 PROCEDURE — 83036 HEMOGLOBIN GLYCOSYLATED A1C: CPT | Performed by: NURSE PRACTITIONER

## 2018-08-28 PROCEDURE — 96372 THER/PROPH/DIAG INJ SC/IM: CPT | Performed by: NURSE PRACTITIONER

## 2018-08-28 RX ORDER — GUAIFENESIN 600 MG/1
1200 TABLET, EXTENDED RELEASE ORAL 2 TIMES DAILY
Qty: 40 TABLET | Refills: 0 | Status: SHIPPED | OUTPATIENT
Start: 2018-08-28 | End: 2018-10-05

## 2018-08-28 RX ORDER — CILOSTAZOL 100 MG/1
100 TABLET ORAL DAILY
Qty: 30 TABLET | Refills: 6 | Status: SHIPPED | OUTPATIENT
Start: 2018-08-28 | End: 2018-12-17

## 2018-08-28 RX ORDER — BENZONATATE 200 MG/1
200 CAPSULE ORAL 3 TIMES DAILY PRN
Qty: 30 CAPSULE | Refills: 0 | Status: SHIPPED | OUTPATIENT
Start: 2018-08-28 | End: 2018-10-05

## 2018-08-28 RX ORDER — ERGOCALCIFEROL 1.25 MG/1
50000 CAPSULE ORAL WEEKLY
Qty: 5 CAPSULE | Refills: 6 | Status: SHIPPED | OUTPATIENT
Start: 2018-08-28 | End: 2019-03-18 | Stop reason: SDUPTHER

## 2018-08-28 RX ORDER — LEVOFLOXACIN 500 MG/1
500 TABLET, FILM COATED ORAL DAILY
Qty: 7 TABLET | Refills: 0 | Status: SHIPPED | OUTPATIENT
Start: 2018-08-28 | End: 2018-10-05

## 2018-08-28 RX ORDER — CALCIUM CARBONATE 500(1250)
500 TABLET ORAL DAILY
Qty: 30 TABLET | Refills: 6 | Status: SHIPPED | OUTPATIENT
Start: 2018-08-28 | End: 2019-10-29

## 2018-08-28 RX ORDER — METHYLPREDNISOLONE ACETATE 80 MG/ML
80 INJECTION, SUSPENSION INTRA-ARTICULAR; INTRALESIONAL; INTRAMUSCULAR; SOFT TISSUE ONCE
Status: COMPLETED | OUTPATIENT
Start: 2018-08-28 | End: 2018-08-28

## 2018-08-28 RX ORDER — ALBUTEROL SULFATE 90 UG/1
2 AEROSOL, METERED RESPIRATORY (INHALATION) EVERY 6 HOURS PRN
Qty: 1 INHALER | Refills: 5 | Status: SHIPPED | OUTPATIENT
Start: 2018-08-28 | End: 2018-11-09 | Stop reason: SDUPTHER

## 2018-08-28 RX ORDER — IBUPROFEN 600 MG/1
600 TABLET ORAL EVERY 8 HOURS PRN
Qty: 60 TABLET | Refills: 6 | Status: SHIPPED | OUTPATIENT
Start: 2018-08-28 | End: 2019-07-08

## 2018-08-28 RX ORDER — CYANOCOBALAMIN 1000 UG/ML
INJECTION, SOLUTION INTRAMUSCULAR; SUBCUTANEOUS
Qty: 1 ML | Refills: 19 | Status: SHIPPED | OUTPATIENT
Start: 2018-08-28 | End: 2019-09-09 | Stop reason: SDUPTHER

## 2018-08-28 RX ADMIN — METHYLPREDNISOLONE ACETATE 80 MG: 80 INJECTION, SUSPENSION INTRA-ARTICULAR; INTRALESIONAL; INTRAMUSCULAR; SOFT TISSUE at 10:22

## 2018-08-28 NOTE — TELEPHONE ENCOUNTER
----- Message from ABHISHEK Bowman sent at 8/28/2018 10:33 AM CDT -----  xrays of right wrist and forearm good as far as fractures go but that doesn't rule out a tear in the muscle or contusion of the muscle so keep an eye on it and let me know if it doesn't continue to improve, in another week or so we can f/u to see about more tests if needed.    Xray of chest shows no fluid around lungs or heart thankfully :)

## 2018-08-29 DIAGNOSIS — E87.6 HYPOKALEMIA: Primary | ICD-10-CM

## 2018-08-29 RX ORDER — POTASSIUM CHLORIDE 750 MG/1
10 TABLET, FILM COATED, EXTENDED RELEASE ORAL DAILY
Qty: 30 TABLET | Refills: 2 | Status: SHIPPED | OUTPATIENT
Start: 2018-08-29 | End: 2019-01-10 | Stop reason: SDUPTHER

## 2018-08-30 ENCOUNTER — TELEPHONE (OUTPATIENT)
Dept: FAMILY MEDICINE CLINIC | Facility: CLINIC | Age: 33
End: 2018-08-30

## 2018-09-02 DIAGNOSIS — J44.1 COPD EXACERBATION (HCC): Primary | ICD-10-CM

## 2018-09-02 RX ORDER — BUDESONIDE AND FORMOTEROL FUMARATE DIHYDRATE 80; 4.5 UG/1; UG/1
2 AEROSOL RESPIRATORY (INHALATION)
Qty: 6.9 G | Refills: 0 | Status: SHIPPED | OUTPATIENT
Start: 2018-09-02 | End: 2019-03-18

## 2018-09-02 RX ORDER — METHYLPREDNISOLONE 4 MG/1
TABLET ORAL
Qty: 1 EACH | Refills: 0 | Status: SHIPPED | OUTPATIENT
Start: 2018-09-02 | End: 2018-10-05

## 2018-09-04 DIAGNOSIS — R60.9 PERIPHERAL EDEMA: ICD-10-CM

## 2018-09-04 RX ORDER — HYDROCHLOROTHIAZIDE 12.5 MG/1
TABLET ORAL
Qty: 60 TABLET | Refills: 5 | Status: SHIPPED | OUTPATIENT
Start: 2018-09-04 | End: 2019-03-18 | Stop reason: SDUPTHER

## 2018-09-12 NOTE — PROGRESS NOTES
Subjective   Veronica Hicks is a 32 y.o. female who presents to the office for bronchitis.     History of Present Illness     Vitamin B12 deficiency-chronic, controlled with vitamin B12 injections.    Vitamin D deficiency-chronic, controlled with vitamin D weekly.    Peripheral arterial disease-chronic, controlled with Pletal 100 mg daily.    Injury to right wrist and right forearm resulting in pain-acute  -Patient stated occurred about a week ago when she was helping work on a car, swelling and bruising states it is open before but has closed up has been wearing a wrist brace.  States anterior wrist and right forearm pain.  Exacerbated by movement.  Has not tried anything but ibuprofen for this.    Bronchitis-acute  -×2 days of clear sinus drainage some sinus pressure, fluid on the ears, ears popping, no headache, fever, shortness of breath, chest tightness, productive cough with white thin sputum.  Patient states history of fluid around her heart and lungs and feels like this is what she has right now but does not think it's pneumonia.    PMH:   Depression-chronic, controlled on prozac 10mg  Chronic AR-controlled with flonase and claritin  Chronic pain of left knee causing unstable gait for which she uses a cane.    The following portions of the patient's history were reviewed and updated as appropriate: allergies, current medications, past family history, past medical history, past social history, past surgical history and problem list.    Review of Systems   Constitutional: Positive for fatigue and fever. Negative for activity change, appetite change and unexpected weight change.   HENT: Positive for congestion, ear discharge, ear pain, postnasal drip, sinus pain and sinus pressure. Negative for nosebleeds, rhinorrhea, sneezing, tinnitus and trouble swallowing.    Eyes: Negative.    Respiratory: Positive for cough, chest tightness, shortness of breath and wheezing.    Cardiovascular: Negative for palpitations  and leg swelling.   Gastrointestinal: Negative for abdominal distention, blood in stool, constipation and diarrhea.   Genitourinary: Negative for difficulty urinating, dyspareunia, dysuria, flank pain, frequency, hematuria, menstrual problem, vaginal bleeding, vaginal discharge and vaginal pain.   Musculoskeletal: Positive for arthralgias and gait problem. Negative for back pain.   Skin: Negative for wound.   Allergic/Immunologic: Negative for environmental allergies, food allergies and immunocompromised state.   Neurological: Negative for dizziness, tremors, seizures, syncope and light-headedness.   Hematological: Does not bruise/bleed easily.   Psychiatric/Behavioral: Positive for sleep disturbance. Negative for behavioral problems, self-injury and suicidal ideas. The patient is nervous/anxious.        Past Medical History:   Diagnosis Date   • Abdominal pain    • Abnormal Pap smear of cervix    • Acute bronchitis    • Acute left otitis media    • Ankle pain    • Anxiety    • Asthma    • Attention deficit hyperactivity disorder    • Bipolar disorder (CMS/HCC)    • Candidiasis    • Candidiasis of vagina    • Depression    • Diabetes mellitus (CMS/HCC)    • Diarrhea    • Dizziness    • Dysuria    • Elbow joint pain     elbow joint - painful on movement   • Elbow joint pain    • Elevated blood pressure    • Encounter for long-term (current) use of medications     Long-term (current) use of other medications    • Epigastric pain    • Excessive thirst    • Fall    • Fatigue    • Feeling tired     tired all the time   • Flank pain    • Gastroesophageal reflux disease    • High risk sexual behavior    • History of malignant neoplasm of cervix    • Hordeolum internum right upper eyelid    • HPV (human papilloma virus) infection    • Hx of blood clots    • Hypokalemia    • Increased frequency of urination    • Infection of skin and subcutaneous tissue    • Irritable bowel syndrome    • Knee pain, left    • Low back pain   "  • Muscle weakness    • Nausea and vomiting    • Pain in limb    • Pain in wrist    • Peripheral arterial disease (CMS/HCC)    • Postartificial menopausal syndrome    • Postmenopausal state    • Serous otitis media    • Skin lesion    • Smokes tobacco daily    • Upper respiratory infection    • Urinary tract infection    • Vaginitis        Family History   Problem Relation Age of Onset   • ADD / ADHD Daughter    • Lung cancer Maternal Grandmother    • Cancer Paternal Grandmother    • Uterine cancer Paternal Grandmother    • Colon cancer Paternal Grandmother    • Diabetes Other    • Liver cancer Other    • Breast cancer Mother    • Ovarian cancer Mother    • Birth defects Brother    • Stroke Paternal Grandfather    • Endometrial cancer Neg Hx           Objective   /70   Pulse 68   Temp 100.5 °F (38.1 °C) (Temporal Artery )   Resp 18   Ht 160 cm (63\")   Wt 92.5 kg (204 lb)   SpO2 99%   Breastfeeding? No   BMI 36.14 kg/m²   Physical Exam   Constitutional: She is oriented to person, place, and time. She appears well-developed and well-nourished. She is cooperative. She does not appear ill.   HENT:   Head: Normocephalic.   Right Ear: Hearing, external ear and ear canal normal. No drainage. Tympanic membrane is injected and bulging. Tympanic membrane is not erythematous and not retracted. No decreased hearing is noted.   Left Ear: Hearing, external ear and ear canal normal. No drainage. Tympanic membrane is injected and bulging. Tympanic membrane is not erythematous and not retracted. No decreased hearing is noted.   Nose: Mucosal edema and rhinorrhea present. Right sinus exhibits no maxillary sinus tenderness and no frontal sinus tenderness. Left sinus exhibits no maxillary sinus tenderness and no frontal sinus tenderness.   Mouth/Throat: Uvula is midline and mucous membranes are normal. Oropharyngeal exudate present.   Eyes: Pupils are equal, round, and reactive to light. Conjunctivae, EOM and lids are " normal. Right eye exhibits no discharge. Left eye exhibits no discharge. Right conjunctiva is not injected. Left conjunctiva is not injected.   Neck: Normal range of motion. Neck supple.   Cardiovascular: Normal rate, regular rhythm, normal heart sounds and intact distal pulses.  Exam reveals no gallop and no friction rub.    No murmur heard.  Pulmonary/Chest: Effort normal. No respiratory distress. She has no decreased breath sounds. She has wheezes. She has no rales.   Abdominal: Soft. Normal appearance, normal aorta and bowel sounds are normal. She exhibits no shifting dullness, no distension, no pulsatile liver, no fluid wave, no abdominal bruit, no ascites, no pulsatile midline mass and no mass. There is no hepatosplenomegaly. There is no tenderness. There is no rebound, no guarding, no CVA tenderness, no tenderness at McBurney's point and negative Ramirez's sign. No hernia.   Musculoskeletal: She exhibits no edema or deformity.        Right wrist: She exhibits decreased range of motion (due to pain) and tenderness. She exhibits no bony tenderness, no swelling, no deformity and no laceration.        Left knee: She exhibits decreased range of motion. Tenderness found.        Right forearm: She exhibits tenderness. She exhibits no bony tenderness, no swelling, no edema, no deformity and no laceration.   Lymphadenopathy:     She has no cervical adenopathy.   Neurological: She is alert and oriented to person, place, and time. She has normal strength and normal reflexes. She displays normal reflexes. No cranial nerve deficit or sensory deficit. She exhibits normal muscle tone. She displays a negative Romberg sign. Coordination normal.   Reflex Scores:       Patellar reflexes are 2+ on the right side and 2+ on the left side.  Wobbling gait at times due to left knee pain.    Skin: Skin is warm, dry and intact. No rash noted. She is not diaphoretic. No erythema. No pallor.   Psychiatric: She has a normal mood and  affect. Her speech is normal and behavior is normal. Thought content normal. Cognition and memory are normal.   Patient is dressed appropriately for weather and situation, makes eye contact, and engages in conversation.  She is attentive.   Nursing note and vitals reviewed.       PHQ-2/PHQ-9 Depression Screening 8/28/2018   Little interest or pleasure in doing things 0   Feeling down, depressed, or hopeless 0   Trouble falling or staying asleep, or sleeping too much -   Feeling tired or having little energy -   Poor appetite or overeating -   Feeling bad about yourself - or that you are a failure or have let yourself or your family down -   Trouble concentrating on things, such as reading the newspaper or watching television -   Moving or speaking so slowly that other people could have noticed. Or the opposite - being so fidgety or restless that you have been moving around a lot more than usual -   Thoughts that you would be better off dead, or of hurting yourself in some way -   Total Score 0   If you checked off any problems, how difficult have these problems made it for you to do your work, take care of things at home, or get along with other people? -         Assessment/Plan   Veronica was seen today for bronchitis.    Diagnoses and all orders for this visit:    Vitamin B 12 deficiency  -     cyanocobalamin 1000 MCG/ML injection; 1,000mcg/1mL Vitamin B12 IM, every day x5days, every week [id number], then once/mtn x 11 months. #20 doses total    Vitamin D deficiency  -     Calcium 500 MG tablet; Take 500 mg by mouth Daily.  -     vitamin D (ERGOCALCIFEROL) 85261 units capsule capsule; Take 1 capsule by mouth 1 (One) Time Per Week.    PAD (peripheral artery disease) (CMS/[facility])  Comments:  minimal on left leg  Orders:  -     cilostazol (PLETAL) 100 MG tablet; Take 1 tablet by mouth Daily. For PAD    Right wrist pain  -     XR Wrist 3+ View Right    Injury of right wrist, initial encounter  -     XR Wrist 3+ View  Right    Right forearm pain  -     XR forearm 2 vw right    Right forearm injury, initial encounter  -     XR forearm 2 vw right    Bronchitis  -     benzonatate (TESSALON) 200 MG capsule; Take 1 capsule by mouth 3 (Three) Times a Day As Needed for Cough.  -     levoFLOXacin (LEVAQUIN) 500 MG tablet; Take 1 tablet by mouth Daily.  -     methylPREDNISolone acetate (DEPO-medrol) injection 80 mg; Inject 1 mL into the appropriate muscle as directed by prescriber 1 (One) Time.  -     guaiFENesin (MUCINEX) 600 MG 12 hr tablet; Take 2 tablets by mouth 2 (Two) Times a Day.  -     XR Chest 2 View    Other orders  -     ibuprofen (ADVIL,MOTRIN) 600 MG tablet; Take 1 tablet by mouth Every 8 (Eight) Hours As Needed for Mild Pain .  -     albuterol (PROVENTIL HFA;VENTOLIN HFA) 108 (90 Base) MCG/ACT inhaler; Inhale 2 puffs Every 6 (Six) Hours As Needed for Wheezing or Shortness of Air.           Vitamin B12 deficiency-chronic, controlled with vitamin B12 injections.    Vitamin D deficiency-chronic, controlled with vitamin D weekly.    Peripheral arterial disease-chronic, controlled with Pletal 100 mg daily.    Injury to right wrist and right forearm resulting in pain-acute  -X-ray of right wrist and right forearm, will call patient with results.  Advised patient not to lift anything over 10 pounds until I get the results back, prescribed ibuprofen.    Bronchitis-acute  -Patient worried about fluid around her lungs or heart, stated that her lungs sounded clear during physical exam but I will do a chest x-ray to be safe.  Will call patient with these results.  Prescribed Levaquin, Depo-Medrol shot given in office, Tessalon Perles, and Mucinex.  Continue albuterol inhaler as needed but for now 2 puffs every 4-6 hours until shortness of breath and cough and chest tightness improved.  Rest and drink plenty of fluids.  Tylenol or ibuprofen for fever/pain.  Educated patient to not take calcium until she is done with the Levaquin due to  decreasing absorption.    Follow-up in 3-6 months.     Patient educated to follow-up sooner than next scheduled appointment if condition(s) worse or do not improve. Patient states understanding and is in agreeance with plan of care. An After Visit Summary was printed and given to the patient.      ABHISHEK Bowman        This document has been electronically signed by ABHISHEK Bowman on September 12, 2018 8:34 AM      EMR/Transcription Dragon Disclaimer:  Some of this note may be an electronic dragon transcription/translation of spoken language to printed text. The electronic translation of spoken language may permit erroneous, or at times, nonsensical words or phrases to be inadvertently transcribed. Although I have reviewed the note for such errors, some may still exist.

## 2018-10-05 ENCOUNTER — LAB (OUTPATIENT)
Dept: LAB | Facility: OTHER | Age: 33
End: 2018-10-05

## 2018-10-05 ENCOUNTER — OFFICE VISIT (OUTPATIENT)
Dept: FAMILY MEDICINE CLINIC | Facility: CLINIC | Age: 33
End: 2018-10-05

## 2018-10-05 VITALS
BODY MASS INDEX: 35.86 KG/M2 | HEART RATE: 72 BPM | HEIGHT: 63 IN | WEIGHT: 202.4 LBS | DIASTOLIC BLOOD PRESSURE: 80 MMHG | TEMPERATURE: 98.5 F | RESPIRATION RATE: 16 BRPM | SYSTOLIC BLOOD PRESSURE: 126 MMHG | OXYGEN SATURATION: 98 %

## 2018-10-05 DIAGNOSIS — M54.6 CHRONIC BILATERAL THORACIC BACK PAIN: ICD-10-CM

## 2018-10-05 DIAGNOSIS — G89.29 CHRONIC BILATERAL LOW BACK PAIN WITHOUT SCIATICA: Primary | ICD-10-CM

## 2018-10-05 DIAGNOSIS — M54.50 CHRONIC BILATERAL LOW BACK PAIN WITHOUT SCIATICA: Primary | ICD-10-CM

## 2018-10-05 DIAGNOSIS — G89.29 CHRONIC BILATERAL THORACIC BACK PAIN: ICD-10-CM

## 2018-10-05 DIAGNOSIS — F32.A DEPRESSION, UNSPECIFIED DEPRESSION TYPE: ICD-10-CM

## 2018-10-05 DIAGNOSIS — F41.9 ANXIETY: ICD-10-CM

## 2018-10-05 DIAGNOSIS — E87.6 HYPOKALEMIA: ICD-10-CM

## 2018-10-05 LAB
ALBUMIN SERPL-MCNC: 4.4 G/DL (ref 3.5–5)
ALBUMIN/GLOB SERPL: 1.2 G/DL (ref 1.1–1.8)
ALP SERPL-CCNC: 76 U/L (ref 38–126)
ALT SERPL W P-5'-P-CCNC: 25 U/L
ANION GAP SERPL CALCULATED.3IONS-SCNC: 5 MMOL/L (ref 5–15)
AST SERPL-CCNC: 20 U/L (ref 14–36)
BILIRUB SERPL-MCNC: 0.8 MG/DL (ref 0.2–1.3)
BUN BLD-MCNC: 15 MG/DL (ref 7–17)
BUN/CREAT SERPL: 16.3 (ref 7–25)
CALCIUM SPEC-SCNC: 9.9 MG/DL (ref 8.4–10.2)
CHLORIDE SERPL-SCNC: 106 MMOL/L (ref 98–107)
CO2 SERPL-SCNC: 28 MMOL/L (ref 22–30)
CREAT BLD-MCNC: 0.92 MG/DL (ref 0.52–1.04)
GFR SERPL CREATININE-BSD FRML MDRD: 72 ML/MIN/1.73 (ref 64–149)
GLOBULIN UR ELPH-MCNC: 3.7 GM/DL (ref 2.3–3.5)
GLUCOSE BLD-MCNC: 88 MG/DL (ref 74–99)
POTASSIUM BLD-SCNC: 4.1 MMOL/L (ref 3.4–5)
PROT SERPL-MCNC: 8.1 G/DL (ref 6.3–8.2)
SODIUM BLD-SCNC: 139 MMOL/L (ref 137–145)

## 2018-10-05 PROCEDURE — 99214 OFFICE O/P EST MOD 30 MIN: CPT | Performed by: NURSE PRACTITIONER

## 2018-10-05 PROCEDURE — 80053 COMPREHEN METABOLIC PANEL: CPT | Performed by: NURSE PRACTITIONER

## 2018-10-05 RX ORDER — PHENAZOPYRIDINE HYDROCHLORIDE 200 MG/1
200 TABLET, FILM COATED ORAL 3 TIMES DAILY PRN
Qty: 30 TABLET | Refills: 0 | Status: SHIPPED | OUTPATIENT
Start: 2018-10-05 | End: 2019-03-18

## 2018-10-05 RX ORDER — VENLAFAXINE HYDROCHLORIDE 37.5 MG/1
CAPSULE, EXTENDED RELEASE ORAL
Qty: 45 CAPSULE | Refills: 0 | Status: SHIPPED | OUTPATIENT
Start: 2018-10-05 | End: 2018-11-09

## 2018-10-05 RX ORDER — METHOCARBAMOL 500 MG/1
500 TABLET, FILM COATED ORAL 2 TIMES DAILY PRN
Qty: 60 TABLET | Refills: 0 | Status: SHIPPED | OUTPATIENT
Start: 2018-10-05 | End: 2018-11-09 | Stop reason: SDUPTHER

## 2018-10-08 ENCOUNTER — TELEPHONE (OUTPATIENT)
Dept: FAMILY MEDICINE CLINIC | Facility: CLINIC | Age: 33
End: 2018-10-08

## 2018-10-08 NOTE — TELEPHONE ENCOUNTER
----- Message from ABHISHEK Bowman sent at 10/6/2018 10:26 PM CDT -----  Xray of thoracic (mid back) and lumbar (lower back) negative. Will try meds, diclofenac twice a day and robaxin prn twice a day and compound 2-3times per day then see if this helps (prescribed at appt the other day), and discuss further workup at your f/u appt.

## 2018-10-08 NOTE — TELEPHONE ENCOUNTER
----- Message from Gina Whittaker, APRN sent at 10/5/2018  3:03 PM CDT -----  Potassium doing good on K supplement please continue just to keep it up within normal limits, recheck every three mtns    Wait to call pt til I get xray results please

## 2018-10-09 ENCOUNTER — TELEPHONE (OUTPATIENT)
Dept: FAMILY MEDICINE CLINIC | Facility: CLINIC | Age: 33
End: 2018-10-09

## 2018-10-09 DIAGNOSIS — R93.89 ABNORMAL X-RAY: Primary | ICD-10-CM

## 2018-10-12 NOTE — TELEPHONE ENCOUNTER
cqlled and spoke with pt in regards to the results from her xray the radiologist did create an addendum and redid or corrected any errors scoliosis was not found and clip nt seen pt stated that that was fine and offered her testing to see the clips she stated that would be fine

## 2018-10-15 NOTE — TELEPHONE ENCOUNTER
Patient has been informed about everything and another xray has been put in to check on the clips in her abdomen.

## 2018-10-22 NOTE — PROGRESS NOTES
Subjective   Veronica Hicks is a 32 y.o. female who presents to the office for f/u.    History of Present Illness     Anxiety/insomnia/Depression-chronic, controlled on prozac 40mg daily  -hx of being on several meds (failed amitriptyline too sleep, worried about seroquel so pt doesn't want to try it, gabapentin po gives pt hallucinations, failed due to ineffectiveness on cymbalta, buspar, risperdal, trazodone, and prozac  Pt agreeable to trying effexor, seeing pennyrile but doesn't have f/u for awhile. Pt states not sleeping good, her mind wont shut off.     Thoracic/lumbar back pain-acute, not improving  -states swelling of back and pain. States flexeril doesn't work, unsrue if she tried mobic. Po gabapentin causing hallucinations.     States no appetite for past wk.     Pt states bladder spasms past few days, no dysuria but some frequency, states pushing fluids, does not want abx.     PMH:   Chronic AR-controlled with flonase and claritin  Chronic pain of left knee causing unstable gait for which she uses a cane.  Vitamin B12 deficiency-chronic, controlled with vitamin B12 injections.  Vitamin D deficiency-chronic, controlled with vitamin D weekly.  Peripheral arterial disease-chronic, controlled with Pletal 100 mg daily.    The following portions of the patient's history were reviewed and updated as appropriate: allergies, current medications, past family history, past medical history, past social history, past surgical history and problem list.    Review of Systems   Constitutional: Positive for appetite change. Negative for activity change, fatigue, fever and unexpected weight change.   HENT: Negative for congestion, ear discharge, ear pain, nosebleeds, postnasal drip, rhinorrhea, sinus pain, sinus pressure, sneezing, tinnitus and trouble swallowing.    Eyes: Negative.    Respiratory: Negative for cough, chest tightness, shortness of breath and wheezing.    Cardiovascular: Negative for palpitations and leg  swelling.   Gastrointestinal: Negative for abdominal distention, blood in stool, constipation and diarrhea.   Genitourinary: Positive for frequency. Negative for difficulty urinating, dyspareunia, dysuria, flank pain, hematuria, menstrual problem, vaginal bleeding, vaginal discharge and vaginal pain.        Bladder spasms   Musculoskeletal: Positive for arthralgias, back pain and gait problem.   Skin: Negative for wound.   Allergic/Immunologic: Negative for environmental allergies, food allergies and immunocompromised state.   Neurological: Negative for dizziness, tremors, seizures, syncope and light-headedness.   Hematological: Does not bruise/bleed easily.   Psychiatric/Behavioral: Positive for sleep disturbance. Negative for behavioral problems, self-injury and suicidal ideas. The patient is nervous/anxious.        Past Medical History:   Diagnosis Date   • Abdominal pain    • Abnormal Pap smear of cervix    • Acute bronchitis    • Acute left otitis media    • Ankle pain    • Anxiety    • Asthma    • Attention deficit hyperactivity disorder    • Bipolar disorder (CMS/HCC)    • Candidiasis    • Candidiasis of vagina    • Depression    • Diabetes mellitus (CMS/HCC)    • Diarrhea    • Dizziness    • Dysuria    • Elbow joint pain     elbow joint - painful on movement   • Elbow joint pain    • Elevated blood pressure    • Encounter for long-term (current) use of medications     Long-term (current) use of other medications    • Epigastric pain    • Excessive thirst    • Fall    • Fatigue    • Feeling tired     tired all the time   • Flank pain    • Gastroesophageal reflux disease    • High risk sexual behavior    • History of malignant neoplasm of cervix    • Hordeolum internum right upper eyelid    • HPV (human papilloma virus) infection    • Hx of blood clots    • Hypokalemia    • Increased frequency of urination    • Infection of skin and subcutaneous tissue    • Irritable bowel syndrome    • Knee pain, left    •  "Low back pain    • Muscle weakness    • Nausea and vomiting    • Pain in limb    • Pain in wrist    • Peripheral arterial disease (CMS/HCC)    • Postartificial menopausal syndrome    • Postmenopausal state    • Serous otitis media    • Skin lesion    • Smokes tobacco daily    • Upper respiratory infection    • Urinary tract infection    • Vaginitis        Family History   Problem Relation Age of Onset   • ADD / ADHD Daughter    • Lung cancer Maternal Grandmother    • Cancer Paternal Grandmother    • Uterine cancer Paternal Grandmother    • Colon cancer Paternal Grandmother    • Diabetes Other    • Liver cancer Other    • Breast cancer Mother    • Ovarian cancer Mother    • Birth defects Brother    • Stroke Paternal Grandfather    • Endometrial cancer Neg Hx           Objective   /80   Pulse 72   Temp 98.5 °F (36.9 °C) (Temporal Artery )   Resp 16   Ht 160 cm (63\")   Wt 91.8 kg (202 lb 6.4 oz)   SpO2 98%   Breastfeeding? No   BMI 35.85 kg/m²   Physical Exam   Constitutional: She is oriented to person, place, and time. She appears well-developed and well-nourished. She is cooperative. She does not appear ill.   HENT:   Head: Normocephalic.   Right Ear: Hearing, tympanic membrane, external ear and ear canal normal. No drainage. Tympanic membrane is not injected, not erythematous, not retracted and not bulging. No decreased hearing is noted.   Left Ear: Hearing, tympanic membrane, external ear and ear canal normal. No drainage. Tympanic membrane is not injected, not erythematous, not retracted and not bulging. No decreased hearing is noted.   Nose: No mucosal edema or rhinorrhea. Right sinus exhibits no maxillary sinus tenderness and no frontal sinus tenderness. Left sinus exhibits no maxillary sinus tenderness and no frontal sinus tenderness.   Mouth/Throat: Uvula is midline and mucous membranes are normal. No oropharyngeal exudate.   Eyes: Pupils are equal, round, and reactive to light. Conjunctivae, EOM " and lids are normal. Right eye exhibits no discharge. Left eye exhibits no discharge. Right conjunctiva is not injected. Left conjunctiva is not injected.   Neck: Normal range of motion. Neck supple.   Cardiovascular: Normal rate, regular rhythm, normal heart sounds and intact distal pulses.  Exam reveals no gallop and no friction rub.    No murmur heard.  Pulmonary/Chest: Effort normal. No respiratory distress. She has no decreased breath sounds. She has wheezes. She has no rales.   Abdominal: Soft. Normal appearance, normal aorta and bowel sounds are normal. She exhibits no shifting dullness, no distension, no pulsatile liver, no fluid wave, no abdominal bruit, no ascites, no pulsatile midline mass and no mass. There is no hepatosplenomegaly. There is no tenderness. There is no rebound, no guarding, no CVA tenderness, no tenderness at McBurney's point and negative Ramirez's sign. No hernia.   Musculoskeletal: She exhibits no edema or deformity.        Thoracic back: She exhibits decreased range of motion, tenderness and pain. She exhibits no swelling (no swelling noted).        Lumbar back: She exhibits decreased range of motion, tenderness and pain. She exhibits no swelling (no swelling ntoed).   Lymphadenopathy:     She has no cervical adenopathy.   Neurological: She is alert and oriented to person, place, and time. She has normal strength and normal reflexes. She displays normal reflexes. No cranial nerve deficit or sensory deficit. She exhibits normal muscle tone. She displays a negative Romberg sign. Coordination normal.   Reflex Scores:       Patellar reflexes are 2+ on the right side and 2+ on the left side.  Wobbling gait at times due to left knee pain.    Skin: Skin is warm, dry and intact. No rash noted. She is not diaphoretic. No erythema. No pallor.   Psychiatric: She has a normal mood and affect. Her speech is normal and behavior is normal. Thought content normal. Cognition and memory are normal.    Patient is dressed appropriately for weather and situation, makes eye contact, and engages in conversation.  She is attentive.   Nursing note and vitals reviewed.       PHQ-2/PHQ-9 Depression Screening 10/5/2018   Little interest or pleasure in doing things 2   Feeling down, depressed, or hopeless 2   Trouble falling or staying asleep, or sleeping too much 3   Feeling tired or having little energy 3   Poor appetite or overeating 3   Feeling bad about yourself - or that you are a failure or have let yourself or your family down 2   Trouble concentrating on things, such as reading the newspaper or watching television 2   Moving or speaking so slowly that other people could have noticed. Or the opposite - being so fidgety or restless that you have been moving around a lot more than usual 3   Thoughts that you would be better off dead, or of hurting yourself in some way 1   Total Score 21   If you checked off any problems, how difficult have these problems made it for you to do your work, take care of things at home, or get along with other people? Somewhat difficult         Assessment/Plan   Veronica was seen today for med refill.    Diagnoses and all orders for this visit:    Chronic bilateral low back pain without sciatica  -     diclofenac (VOLTAREN) 50 MG EC tablet; Take 1 tablet by mouth 2 (Two) Times a Day.  -     methocarbamol (ROBAXIN) 500 MG tablet; Take 1 tablet by mouth 2 (Two) Times a Day As Needed for Muscle Spasms.  -     XR Spine Lumbar 2 or 3 View    Chronic bilateral thoracic back pain  -     diclofenac (VOLTAREN) 50 MG EC tablet; Take 1 tablet by mouth 2 (Two) Times a Day.  -     methocarbamol (ROBAXIN) 500 MG tablet; Take 1 tablet by mouth 2 (Two) Times a Day As Needed for Muscle Spasms.  -     XR spine thoracic 2 vw    Anxiety  -     venlafaxine XR (EFFEXOR XR) 37.5 MG 24 hr capsule; Wk1-2: one tab daily, Wk3-4: two tab daily    Depression, unspecified depression type  -     venlafaxine XR  (EFFEXOR XR) 37.5 MG 24 hr capsule; Wk1-2: one tab daily, Wk3-4: two tab daily    Other orders  -     phenazopyridine (PYRIDIUM) 200 MG tablet; Take 1 tablet by mouth 3 (Three) Times a Day As Needed for bladder spasms.           Anxiety/insomnia/Depression-chronic, controlled on prozac 40mg daily  -d/c prozac, pt educated to taper, prescribed effexor, f/u in one tmn.    Thoracic/lumbar back pain-acute, not improving  -xray of thoracic and lumbar spine done, will call pt with results, robaxin and voltaren prescribed, f/u in one mtn and after xray results. Prescribed compound.     Acute appetite loss-probably secondary to anxiety, will treat anxiety and see if it helps.     Pt states bladder spasms past few days, no dysuria but some frequency, states pushing fluids, does not want abx. Prescribed pyridium. F/u if not imprvoing    F/u in one mtn.     Patient educated to follow-up sooner than next scheduled appointment if condition(s) worse or do not improve. Patient states understanding and is in agreeance with plan of care. An After Visit Summary was printed and given to the patient.      ABHISHEK Bowman        This document has been electronically signed by ABHISHEK Bowman on October 21, 2018 10:33 PM      EMR/Transcription Dragon Disclaimer:  Some of this note may be an electronic dragon transcription/translation of spoken language to printed text. The electronic translation of spoken language may permit erroneous, or at times, nonsensical words or phrases to be inadvertently transcribed. Although I have reviewed the note for such errors, some may still exist.

## 2018-11-09 ENCOUNTER — OFFICE VISIT (OUTPATIENT)
Dept: FAMILY MEDICINE CLINIC | Facility: CLINIC | Age: 33
End: 2018-11-09

## 2018-11-09 VITALS
BODY MASS INDEX: 36.71 KG/M2 | SYSTOLIC BLOOD PRESSURE: 112 MMHG | HEART RATE: 84 BPM | TEMPERATURE: 99.3 F | DIASTOLIC BLOOD PRESSURE: 84 MMHG | HEIGHT: 63 IN | WEIGHT: 207.2 LBS

## 2018-11-09 DIAGNOSIS — R51.9 ACUTE INTRACTABLE HEADACHE, UNSPECIFIED HEADACHE TYPE: ICD-10-CM

## 2018-11-09 DIAGNOSIS — M54.6 CHRONIC BILATERAL THORACIC BACK PAIN: ICD-10-CM

## 2018-11-09 DIAGNOSIS — M54.50 CHRONIC BILATERAL LOW BACK PAIN WITHOUT SCIATICA: ICD-10-CM

## 2018-11-09 DIAGNOSIS — R42 VERTIGO: ICD-10-CM

## 2018-11-09 DIAGNOSIS — R55 BLACK-OUT (NOT AMNESIA): ICD-10-CM

## 2018-11-09 DIAGNOSIS — R00.2 PALPITATIONS: ICD-10-CM

## 2018-11-09 DIAGNOSIS — G89.29 CHRONIC BILATERAL LOW BACK PAIN WITHOUT SCIATICA: ICD-10-CM

## 2018-11-09 DIAGNOSIS — R20.0 LEFT ARM NUMBNESS: ICD-10-CM

## 2018-11-09 DIAGNOSIS — R07.9 CHEST PAIN, UNSPECIFIED TYPE: Primary | ICD-10-CM

## 2018-11-09 DIAGNOSIS — G89.29 CHRONIC BILATERAL THORACIC BACK PAIN: ICD-10-CM

## 2018-11-09 DIAGNOSIS — R06.02 SHORTNESS OF BREATH: ICD-10-CM

## 2018-11-09 DIAGNOSIS — G89.29 NECK PAIN, CHRONIC: ICD-10-CM

## 2018-11-09 DIAGNOSIS — M54.2 NECK PAIN, CHRONIC: ICD-10-CM

## 2018-11-09 LAB
ALBUMIN SERPL-MCNC: 4.4 G/DL (ref 3.5–5)
ALBUMIN/GLOB SERPL: 1.2 G/DL (ref 1.1–1.8)
ALP SERPL-CCNC: 73 U/L (ref 38–126)
ALT SERPL W P-5'-P-CCNC: 40 U/L
ANION GAP SERPL CALCULATED.3IONS-SCNC: 7 MMOL/L (ref 5–15)
AST SERPL-CCNC: 23 U/L (ref 14–36)
BASOPHILS # BLD AUTO: 0.03 10*3/MM3 (ref 0–0.2)
BASOPHILS NFR BLD AUTO: 0.3 % (ref 0–2)
BILIRUB SERPL-MCNC: 0.4 MG/DL (ref 0.2–1.3)
BILIRUB UR QL STRIP: NEGATIVE
BUN BLD-MCNC: 10 MG/DL (ref 7–17)
BUN/CREAT SERPL: 11.4 (ref 7–25)
CALCIUM SPEC-SCNC: 9.6 MG/DL (ref 8.4–10.2)
CHLORIDE SERPL-SCNC: 110 MMOL/L (ref 98–107)
CLARITY UR: CLEAR
CO2 SERPL-SCNC: 26 MMOL/L (ref 22–30)
COLOR UR: YELLOW
CREAT BLD-MCNC: 0.88 MG/DL (ref 0.52–1.04)
D-DIMER, QUANTITATIVE (MAD,POW, STR): <100 NG/ML (FEU)
DEPRECATED RDW RBC AUTO: 43 FL (ref 36.4–46.3)
EOSINOPHIL # BLD AUTO: 0.57 10*3/MM3 (ref 0–0.7)
EOSINOPHIL NFR BLD AUTO: 5.2 % (ref 0–7)
ERYTHROCYTE [DISTWIDTH] IN BLOOD BY AUTOMATED COUNT: 14.3 % (ref 11.5–14.5)
GFR SERPL CREATININE-BSD FRML MDRD: 75 ML/MIN/1.73 (ref 64–149)
GLOBULIN UR ELPH-MCNC: 3.7 GM/DL (ref 2.3–3.5)
GLUCOSE BLD-MCNC: 91 MG/DL (ref 74–99)
GLUCOSE UR STRIP-MCNC: NEGATIVE MG/DL
HCT VFR BLD AUTO: 44.3 % (ref 35–45)
HGB BLD-MCNC: 14.4 G/DL (ref 12–15.5)
HGB UR QL STRIP.AUTO: NEGATIVE
KETONES UR QL STRIP: NEGATIVE
LEUKOCYTE ESTERASE UR QL STRIP.AUTO: NEGATIVE
LYMPHOCYTES # BLD AUTO: 2.94 10*3/MM3 (ref 0.6–4.2)
LYMPHOCYTES NFR BLD AUTO: 26.9 % (ref 10–50)
MCH RBC QN AUTO: 26.7 PG (ref 26.5–34)
MCHC RBC AUTO-ENTMCNC: 32.5 G/DL (ref 31.4–36)
MCV RBC AUTO: 82.2 FL (ref 80–98)
MONOCYTES # BLD AUTO: 0.63 10*3/MM3 (ref 0–0.9)
MONOCYTES NFR BLD AUTO: 5.8 % (ref 0–12)
NEUTROPHILS # BLD AUTO: 6.74 10*3/MM3 (ref 2–8.6)
NEUTROPHILS NFR BLD AUTO: 61.8 % (ref 37–80)
NITRITE UR QL STRIP: NEGATIVE
PH UR STRIP.AUTO: 5.5 [PH] (ref 5.5–8)
PLATELET # BLD AUTO: 322 10*3/MM3 (ref 150–450)
PMV BLD AUTO: 9.1 FL (ref 8–12)
POTASSIUM BLD-SCNC: 4.2 MMOL/L (ref 3.4–5)
PROT SERPL-MCNC: 8.1 G/DL (ref 6.3–8.2)
PROT UR QL STRIP: NEGATIVE
RBC # BLD AUTO: 5.39 10*6/MM3 (ref 3.77–5.16)
SODIUM BLD-SCNC: 143 MMOL/L (ref 137–145)
SP GR UR STRIP: >=1.03 (ref 1–1.03)
TROPONIN I SERPL-MCNC: <0.012 NG/ML
UROBILINOGEN UR QL STRIP: NORMAL
WBC NRBC COR # BLD: 10.91 10*3/MM3 (ref 3.2–9.8)

## 2018-11-09 PROCEDURE — 84484 ASSAY OF TROPONIN QUANT: CPT | Performed by: NURSE PRACTITIONER

## 2018-11-09 PROCEDURE — 85379 FIBRIN DEGRADATION QUANT: CPT | Performed by: NURSE PRACTITIONER

## 2018-11-09 PROCEDURE — 80053 COMPREHEN METABOLIC PANEL: CPT | Performed by: NURSE PRACTITIONER

## 2018-11-09 PROCEDURE — 93000 ELECTROCARDIOGRAM COMPLETE: CPT | Performed by: NURSE PRACTITIONER

## 2018-11-09 PROCEDURE — 99214 OFFICE O/P EST MOD 30 MIN: CPT | Performed by: NURSE PRACTITIONER

## 2018-11-09 PROCEDURE — 85025 COMPLETE CBC W/AUTO DIFF WBC: CPT | Performed by: NURSE PRACTITIONER

## 2018-11-09 RX ORDER — DOXYCYCLINE HYCLATE 100 MG/1
100 CAPSULE ORAL 2 TIMES DAILY
Qty: 20 CAPSULE | Refills: 0 | Status: SHIPPED | OUTPATIENT
Start: 2018-11-09 | End: 2019-02-26

## 2018-11-09 RX ORDER — ALBUTEROL SULFATE 90 UG/1
2 AEROSOL, METERED RESPIRATORY (INHALATION) EVERY 6 HOURS PRN
Qty: 1 INHALER | Refills: 5 | Status: SHIPPED | OUTPATIENT
Start: 2018-11-09 | End: 2019-09-16 | Stop reason: SDUPTHER

## 2018-11-09 RX ORDER — ONDANSETRON 4 MG/1
4 TABLET, FILM COATED ORAL EVERY 8 HOURS PRN
Qty: 30 TABLET | Refills: 3 | Status: SHIPPED | OUTPATIENT
Start: 2018-11-09 | End: 2019-02-23 | Stop reason: SDUPTHER

## 2018-11-09 RX ORDER — METHOCARBAMOL 500 MG/1
500 TABLET, FILM COATED ORAL 2 TIMES DAILY PRN
Qty: 60 TABLET | Refills: 5 | Status: SHIPPED | OUTPATIENT
Start: 2018-11-09 | End: 2019-09-08 | Stop reason: SDUPTHER

## 2018-11-09 RX ORDER — KETOROLAC TROMETHAMINE 30 MG/ML
60 INJECTION, SOLUTION INTRAMUSCULAR; INTRAVENOUS ONCE
Status: SHIPPED | OUTPATIENT
Start: 2018-11-09 | End: 2018-11-14

## 2018-11-11 NOTE — PROGRESS NOTES
"Subjective   Veronica Hicks is a 32 y.o. female who presents to the office for f/u.    History of Present Illness     Anxiety/insomnia/Depression-chronic, uncontrolled on effexor  -hx of being on several meds (failed amitriptyline too sleep, worried about seroquel so pt doesn't want to try it, gabapentin po gives pt hallucinations, failed due to ineffectiveness on cymbalta, buspar, risperdal, trazodone, and prozac. Hx of seeing pennyrile. States today effexor is not working and causing all kinds of other problems. Pt also states see has been on wellbutrin but doesn't remember if it worked or not.     Thoracic/lumbar back pain-acute, improving with robaxin and diclofenac and compound.   -flexeril doesn't work, gabapentin PO causes hallucinations.     Pt states since last mtn when she started on effexor, her anxiety has been worse. She stopped effexor two days ago. Pt c/o of several symptoms. States she currently has a UTI but has been on a cranberry regimen, states some dysuria but treatment is helping. States palpitations. Not SOB but can't get a full breath every time. States nauseous 24/7, vertigo where vision goes out and goes black. Pt states yesterday she had a \"mini stroke\" her left arm numbness/tingling/pain searing pain going up into her chest like ripping like pain, pt states she quite breathing and her partner Ha had to do a sternal rub, he told her that she was out for about 45sec-one min, and that he tried to do breathing but her tongue swelled up so much he closed off her throat. Pt since then c/o of numbness in her fingers and even in her teeth, she states left arm weakness and numbness, sweating, chest pain like pressure and \"cant think or breath\". Pt states trouble with memory and thinking straight. Pt does complain of neck apin and severe stiffness especially in the am. Pt states today and in the past history of \"mini strokes\".     Acute abscess of left buttock-acute  X several days.     PMH: "   Chronic AR-controlled with flonase and claritin  Chronic pain of left knee causing unstable gait for which she uses a cane.  Vitamin B12 deficiency-chronic, controlled with vitamin B12 injections.  Vitamin D deficiency-chronic, controlled with vitamin D weekly.  Peripheral arterial disease-chronic, controlled with Pletal 100 mg daily.    The following portions of the patient's history were reviewed and updated as appropriate: allergies, current medications, past family history, past medical history, past social history, past surgical history and problem list.    Review of Systems   Constitutional: Positive for activity change, chills and diaphoresis. Negative for appetite change, fatigue, fever and unexpected weight change.   HENT: Negative for congestion, ear discharge, ear pain, nosebleeds, postnasal drip, rhinorrhea, sinus pressure, sinus pain, sneezing, tinnitus and trouble swallowing.    Eyes: Negative.    Respiratory: Positive for chest tightness and shortness of breath. Negative for cough and wheezing.    Cardiovascular: Positive for chest pain and palpitations. Negative for leg swelling.   Gastrointestinal: Negative for abdominal distention, blood in stool, constipation and diarrhea.   Genitourinary: Positive for dysuria. Negative for difficulty urinating, dyspareunia, flank pain, frequency, hematuria, menstrual problem, vaginal bleeding, vaginal discharge and vaginal pain.        Bladder spasms   Musculoskeletal: Positive for arthralgias, back pain, gait problem, neck pain and neck stiffness.   Skin: Negative for wound.   Allergic/Immunologic: Negative for environmental allergies, food allergies and immunocompromised state.   Neurological: Positive for syncope, weakness, numbness and headaches. Negative for dizziness, tremors, seizures and light-headedness.   Hematological: Does not bruise/bleed easily.   Psychiatric/Behavioral: Positive for sleep disturbance. Negative for behavioral problems, self-injury  and suicidal ideas. The patient is nervous/anxious.        Past Medical History:   Diagnosis Date   • Abdominal pain    • Abnormal Pap smear of cervix    • Acute bronchitis    • Acute left otitis media    • Ankle pain    • Anxiety    • Asthma    • Attention deficit hyperactivity disorder    • Bipolar disorder (CMS/HCC)    • Candidiasis    • Candidiasis of vagina    • Depression    • Diabetes mellitus (CMS/HCC)    • Diarrhea    • Dizziness    • Dysuria    • Elbow joint pain     elbow joint - painful on movement   • Elbow joint pain    • Elevated blood pressure    • Encounter for long-term (current) use of medications     Long-term (current) use of other medications    • Epigastric pain    • Excessive thirst    • Fall    • Fatigue    • Feeling tired     tired all the time   • Flank pain    • Gastroesophageal reflux disease    • High risk sexual behavior    • History of malignant neoplasm of cervix    • Hordeolum internum right upper eyelid    • HPV (human papilloma virus) infection    • Hx of blood clots    • Hypokalemia    • Increased frequency of urination    • Infection of skin and subcutaneous tissue    • Irritable bowel syndrome    • Knee pain, left    • Low back pain    • Muscle weakness    • Nausea and vomiting    • Pain in limb    • Pain in wrist    • Peripheral arterial disease (CMS/HCC)    • Postartificial menopausal syndrome    • Postmenopausal state    • Serous otitis media    • Skin lesion    • Smokes tobacco daily    • Upper respiratory infection    • Urinary tract infection    • Vaginitis        Family History   Problem Relation Age of Onset   • ADD / ADHD Daughter    • Lung cancer Maternal Grandmother    • Cancer Paternal Grandmother    • Uterine cancer Paternal Grandmother    • Colon cancer Paternal Grandmother    • Diabetes Other    • Liver cancer Other    • Breast cancer Mother    • Ovarian cancer Mother    • Birth defects Brother    • Stroke Paternal Grandfather    • Endometrial cancer Neg Hx      "      Objective   /84   Pulse 84   Temp 99.3 °F (37.4 °C)   Ht 160 cm (63\")   Wt 94 kg (207 lb 3.2 oz)   BMI 36.70 kg/m²   Physical Exam   Constitutional: She is oriented to person, place, and time. She appears well-developed and well-nourished. She is cooperative. She does not appear ill.   HENT:   Head: Normocephalic.   Right Ear: Hearing, tympanic membrane, external ear and ear canal normal. No drainage. Tympanic membrane is not injected, not erythematous, not retracted and not bulging. No decreased hearing is noted.   Left Ear: Hearing, tympanic membrane, external ear and ear canal normal. No drainage. Tympanic membrane is not injected, not erythematous, not retracted and not bulging. No decreased hearing is noted.   Nose: No mucosal edema or rhinorrhea. Right sinus exhibits no maxillary sinus tenderness and no frontal sinus tenderness. Left sinus exhibits no maxillary sinus tenderness and no frontal sinus tenderness.   Mouth/Throat: Uvula is midline and mucous membranes are normal. No oropharyngeal exudate.   Eyes: Conjunctivae and lids are normal. Pupils are equal, round, and reactive to light. Right eye exhibits no discharge. Left eye exhibits no discharge. Right conjunctiva is not injected. Left conjunctiva is not injected. Right eye exhibits no nystagmus. Right eye abnormal extraocular motion: unable to fully assess EOMs. Left eye exhibits no nystagmus. Left eye abnormal extraocular motion: unable to fully assess EOMs.   Neck: Normal range of motion. Neck supple.   Cardiovascular: Normal rate, regular rhythm, normal heart sounds and intact distal pulses. Exam reveals no gallop and no friction rub.   No murmur heard.  Pulmonary/Chest: Effort normal. No respiratory distress. She has no decreased breath sounds. She has wheezes. She has no rales.   Abdominal: Soft. Normal appearance, normal aorta and bowel sounds are normal. She exhibits no shifting dullness, no distension, no pulsatile liver, no " fluid wave, no abdominal bruit, no ascites, no pulsatile midline mass and no mass. There is no hepatosplenomegaly. There is no tenderness. There is no rebound, no guarding, no CVA tenderness, no tenderness at McBurney's point and negative Ramirez's sign. No hernia.   Musculoskeletal: She exhibits no edema or deformity.        Cervical back: She exhibits decreased range of motion (due to stiffness), tenderness, bony tenderness, pain and spasm. She exhibits no swelling, no edema, no deformity, no laceration and normal pulse.        Thoracic back: She exhibits decreased range of motion, tenderness and pain. She exhibits no swelling (no swelling noted).        Lumbar back: She exhibits decreased range of motion, tenderness and pain. She exhibits no swelling (no swelling ntoed).   Lymphadenopathy:     She has no cervical adenopathy.   Neurological: She is alert and oriented to person, place, and time. She has normal strength and normal reflexes. She displays normal reflexes. No sensory deficit.   Reflex Scores:       Patellar reflexes are 2+ on the right side and 2+ on the left side.  Wobbling gait at times due to left knee pain. Did not assess gait but patient able to move from exam table to chair however with some difficulty and  Pt states discomfort with movement and seems uncomfortable. Unable to assess romberg due to possible worsening of symptoms. Cranial nerves intact except EOM unable to assess because pt stated it makes her feel dizzy. Strength of bilateral upper and lower extremities normal but pt states decreased weakness and numbness of left arm. Pt states using two canes at home to get around but did not bring them to visit, walked to exam room, however we took her to xrays and labs and walked her out with her in a wheelchair due to patient stating dizziness and trouble ambulating.    Skin: Skin is warm, dry and intact. No rash noted. She is not diaphoretic. No erythema. No pallor.   Psychiatric: Her behavior  is normal. Thought content normal. Her mood appears anxious. Her speech is rapid and/or pressured. Cognition and memory are normal.   Patient is dressed appropriately for weather and situation, makes eye contact, and engages in conversation. Pt seems very anxious and cries during visit.  She is attentive.   Nursing note and vitals reviewed.       PHQ-2/PHQ-9 Depression Screening 10/5/2018   Little interest or pleasure in doing things 2   Feeling down, depressed, or hopeless 2   Trouble falling or staying asleep, or sleeping too much 3   Feeling tired or having little energy 3   Poor appetite or overeating 3   Feeling bad about yourself - or that you are a failure or have let yourself or your family down 2   Trouble concentrating on things, such as reading the newspaper or watching television 2   Moving or speaking so slowly that other people could have noticed. Or the opposite - being so fidgety or restless that you have been moving around a lot more than usual 3   Thoughts that you would be better off dead, or of hurting yourself in some way 1   Total Score 21   If you checked off any problems, how difficult have these problems made it for you to do your work, take care of things at home, or get along with other people? Somewhat difficult         Assessment/Plan   Veronica was seen today for med refill.    Diagnoses and all orders for this visit:    Chest pain, unspecified type  -     D-dimer, Quantitative  -     ECG 12 Lead  -     XR Chest 2 View  -     Cancel: Troponin I  -     Troponin    Left arm numbness  -     D-dimer, Quantitative  -     ECG 12 Lead  -     XR Chest 2 View  -     Cancel: Troponin I  -     XR Spine Cervical 2 or 3 View  -     Troponin    Vertigo  -     CBC & Differential  -     Comprehensive Metabolic Panel  -     Urinalysis With Culture If Indicated - Urine, Clean Catch  -     CBC Auto Differential  -     MRI Brain With & Without Contrast    Black-out (not amnesia)  -     CBC &  Differential  -     Comprehensive Metabolic Panel  -     Urinalysis With Culture If Indicated - Urine, Clean Catch  -     CBC Auto Differential  -     MRI Brain With & Without Contrast    Neck pain, chronic  -     XR Spine Cervical 2 or 3 View  -     ketorolac (TORADOL) injection 60 mg; Inject 2 mL into the appropriate muscle as directed by prescriber 1 (One) Time.    Shortness of breath  -     D-dimer, Quantitative  -     ECG 12 Lead  -     XR Chest 2 View  -     Cancel: Troponin I    Chronic bilateral low back pain without sciatica  -     methocarbamol (ROBAXIN) 500 MG tablet; Take 1 tablet by mouth 2 (Two) Times a Day As Needed for Muscle Spasms.  -     diclofenac (VOLTAREN) 50 MG EC tablet; Take 1 tablet by mouth 2 (Two) Times a Day.    Chronic bilateral thoracic back pain  -     methocarbamol (ROBAXIN) 500 MG tablet; Take 1 tablet by mouth 2 (Two) Times a Day As Needed for Muscle Spasms.  -     diclofenac (VOLTAREN) 50 MG EC tablet; Take 1 tablet by mouth 2 (Two) Times a Day.    Palpitations    Acute intractable headache, unspecified headache type  -     MRI Brain With & Without Contrast    Other orders  -     albuterol (PROVENTIL HFA;VENTOLIN HFA) 108 (90 Base) MCG/ACT inhaler; Inhale 2 puffs Every 6 (Six) Hours As Needed for Wheezing or Shortness of Air.  -     ondansetron (ZOFRAN) 4 MG tablet; Take 1 tablet by mouth Every 8 (Eight) Hours As Needed for Nausea or Vomiting.  -     doxycycline (VIBRAMYCIN) 100 MG capsule; Take 1 capsule by mouth 2 (Two) Times a Day.           Anxiety/insomnia/Depression-chronic, uncontrolled on effexor  -this is not the patient's main issue so for now, pt has stopped effexor, will remain off of and we will address this issue after acute issues have resolved.     Thoracic/lumbar back pain-acute, improving with robaxin and diclofenac and compound.     Pt states acute symptoms, several, primary complaints are neck pain/stiffness, headache, black out,vertigo, left arm  numbness/tingling/pain, SOB, chest pain, palpitations, memory problems.   -during office visit..ordered stat ekg, chest xray, neck xray, ddimer, cbc, cmp, ua all stat. All labs negative for anything. Troponin ordered but will call pt about this if elevated when it gets resulted. xrays negative. ekg showed prolonged QT which pt has hx of on previous EKGs, otherwise negative. Pt advised that she needs MRI of her head due to symptoms and a further work up at the ER, strongly advised pt goes to ER. Pt refuses, states she wants to wait til Monday to do MRI but states if she gets worse she will go to ER. zofran for nausea, albuterol inhaler in case pt needs it. Continue medications for back for neck pain as well. Plan of care is pt advised to go to the ER now for further work up. Pt refuses, advised to go if anything get worse. Will order MRI of brain at [facility] and try to get approved for Monday. Will call pt Monday to see how she is doing.     Acute abscess of left buttock-acute  -prescribed doxcycline. F/u if needed.     EKG:  Indication: chest pain, palpitations, vertigo, left arm numbness/tingling/pain  Vent Rate: 78 bpm  LUIS ANTONIO:  152 ms  QRS:  82 ms  QT/QTc: 434/494 ms  Interpretation: NSR, prolonged QT, abnormal EKG  Previous EKG: hx of prolonged QT on ekg previously.     F/u Monday with call and MRI of brain.    Patient educated to follow-up sooner than next scheduled appointment if condition(s) worse or do not improve. Patient states understanding and is in agreeance with plan of care. An After Visit Summary was printed and given to the patient.      ABHISHEK Bowman        This document has been electronically signed by ABHISHEK Bowman on November 11, 2018 4:01 PM      EMR/Transcription Dragon Disclaimer:  Some of this note may be an electronic dragon transcription/translation of spoken language to printed text. The electronic translation of spoken language may permit erroneous, or at times, nonsensical  words or phrases to be inadvertently transcribed. Although I have reviewed the note for such errors, some may still exist.

## 2018-11-12 ENCOUNTER — TELEPHONE (OUTPATIENT)
Dept: FAMILY MEDICINE CLINIC | Facility: CLINIC | Age: 33
End: 2018-11-12

## 2018-11-12 NOTE — TELEPHONE ENCOUNTER
----- Message from ABHISHEK Bowman sent at 11/11/2018  1:45 PM CST -----  Troponin level not elevated

## 2018-11-15 ENCOUNTER — TELEPHONE (OUTPATIENT)
Dept: FAMILY MEDICINE CLINIC | Facility: CLINIC | Age: 33
End: 2018-11-15

## 2018-11-16 NOTE — TELEPHONE ENCOUNTER
Pt was notified that the results were negative as well as told that is symptoms of forgetfulness still happen she needs to see neuro and chest pain and numbness in arm needs to see cardio and if they happen again she was advised to go directly to the ER

## 2018-11-16 NOTE — TELEPHONE ENCOUNTER
I went through all my stuff in my basket at home so it must have been put in there this afternoon because I don't have it so i'll let her know tomorrow.

## 2018-11-16 NOTE — TELEPHONE ENCOUNTER
Reviewed MRI of brain without and with contrast done at [facility] on 11/14/18 was negative.

## 2018-12-17 ENCOUNTER — OFFICE VISIT (OUTPATIENT)
Dept: FAMILY MEDICINE CLINIC | Facility: CLINIC | Age: 33
End: 2018-12-17

## 2018-12-17 VITALS
BODY MASS INDEX: 37.74 KG/M2 | SYSTOLIC BLOOD PRESSURE: 120 MMHG | HEART RATE: 81 BPM | WEIGHT: 213 LBS | OXYGEN SATURATION: 100 % | DIASTOLIC BLOOD PRESSURE: 82 MMHG | TEMPERATURE: 97.6 F | RESPIRATION RATE: 16 BRPM | HEIGHT: 63 IN

## 2018-12-17 DIAGNOSIS — I73.00 RAYNAUD'S PHENOMENON WITHOUT GANGRENE: ICD-10-CM

## 2018-12-17 DIAGNOSIS — F31.9 BIPOLAR AFFECTIVE DISORDER, REMISSION STATUS UNSPECIFIED (HCC): ICD-10-CM

## 2018-12-17 DIAGNOSIS — E66.01 MORBIDLY OBESE (HCC): ICD-10-CM

## 2018-12-17 DIAGNOSIS — R20.2 PARESTHESIA OF BOTH HANDS: Primary | ICD-10-CM

## 2018-12-17 DIAGNOSIS — R20.2 PARESTHESIA OF BOTH FEET: ICD-10-CM

## 2018-12-17 PROBLEM — M22.2X1 PATELLOFEMORAL STRESS SYNDROME OF BOTH KNEES: Chronic | Status: ACTIVE | Noted: 2017-05-11

## 2018-12-17 PROBLEM — F17.218 NICOTINE DEPENDENCE, CIGARETTES, WITH OTHER NICOTINE-INDUCED DISORDERS: Chronic | Status: ACTIVE | Noted: 2018-01-17

## 2018-12-17 PROBLEM — K21.00 GASTROESOPHAGEAL REFLUX DISEASE WITH ESOPHAGITIS: Status: ACTIVE | Noted: 2017-11-14

## 2018-12-17 PROBLEM — K21.00 GASTROESOPHAGEAL REFLUX DISEASE WITH ESOPHAGITIS: Chronic | Status: ACTIVE | Noted: 2017-11-14

## 2018-12-17 PROBLEM — R11.2 NON-INTRACTABLE VOMITING WITH NAUSEA: Chronic | Status: ACTIVE | Noted: 2017-11-14

## 2018-12-17 PROBLEM — M25.562 CHRONIC PAIN OF BOTH KNEES: Chronic | Status: ACTIVE | Noted: 2017-05-11

## 2018-12-17 PROBLEM — M54.50 LOW BACK PAIN: Chronic | Status: ACTIVE | Noted: 2017-10-02

## 2018-12-17 PROBLEM — G89.29 CHRONIC PAIN OF BOTH KNEES: Chronic | Status: ACTIVE | Noted: 2017-05-11

## 2018-12-17 PROBLEM — R06.02 SOB (SHORTNESS OF BREATH): Chronic | Status: ACTIVE | Noted: 2018-03-19

## 2018-12-17 PROBLEM — IMO0001 CLASS 3 OBESITY DUE TO EXCESS CALORIES WITH SERIOUS COMORBIDITY AND BODY MASS INDEX (BMI) OF 40.0 TO 44.9 IN ADULT: Chronic | Status: ACTIVE | Noted: 2018-01-17

## 2018-12-17 PROBLEM — M25.561 CHRONIC PAIN OF BOTH KNEES: Chronic | Status: ACTIVE | Noted: 2017-05-11

## 2018-12-17 PROBLEM — E16.2 HYPOGLYCEMIA: Chronic | Status: ACTIVE | Noted: 2018-02-12

## 2018-12-17 PROBLEM — K58.9 IRRITABLE BOWEL SYNDROME: Chronic | Status: ACTIVE | Noted: 2018-01-17

## 2018-12-17 PROBLEM — M22.2X2 PATELLOFEMORAL STRESS SYNDROME OF BOTH KNEES: Chronic | Status: ACTIVE | Noted: 2017-05-11

## 2018-12-17 PROCEDURE — 99214 OFFICE O/P EST MOD 30 MIN: CPT | Performed by: NURSE PRACTITIONER

## 2018-12-17 NOTE — PROGRESS NOTES
Chief Complaint   Patient presents with   • Follow-up     Panfilo pt / pt states Gina wants to refer her to neurologist and cardiologist   • Foot Injury     stepped on glass      Subjective   Veronicaclaude Hicks is a 33 y.o. female who presents to the office for chronic numbness and tingling of bilateral hand and bilateral feet. She had an LEONID and follow up visit with Dr Crawford, CV surgery in [facility] 2018, which was normal and showed no evidence of PAD. I will stop the Pletal, accordingly. She denies having any notable improvement in the numbness and tingling of the hands and feet, onset 3 years ago, worsening over the past year. She complains of an unrelated left knee instability which causes frequent falls, and for which she walks with a cane from time to time. 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  She would benefit from EMG of BLE and BUE to determine etiology of the paresthesias. The paresthesia is constant, states worsening with cold weather and stress.  I will send her to Dr Cagle for EMGs and evaluation of paresthesia and also to evaluate for possible Raynaud Syndrome, as patient complains of discoloration of hands and feet when stressed or cold.     The following portions of the patient's history were reviewed and updated as appropriate: allergies, current medications, past family history, past medical history, past social history, past surgical history and problem list.    History of Present Illness     Past Medical History:   Diagnosis Date   • Abdominal pain    • Abnormal Pap smear of cervix    • Acute bronchitis    • Acute left otitis media    • Ankle pain    • Anxiety    • Asthma    • Attention deficit hyperactivity disorder    • Bipolar disorder (CMS/[facility])    • Candidiasis    • Candidiasis of vagina    • Depression    • Diabetes mellitus (CMS/HCC)    • Diarrhea    • Dizziness    • Dysuria    • Elbow joint pain     elbow joint - painful on movement   • Elbow joint pain    • Elevated blood  pressure    • Encounter for long-term (current) use of medications     Long-term (current) use of other medications    • Epigastric pain    • Excessive thirst    • Fall    • Fatigue    • Feeling tired     tired all the time   • Flank pain    • Gastroesophageal reflux disease    • High risk sexual behavior    • History of malignant neoplasm of cervix    • Hordeolum internum right upper eyelid    • HPV (human papilloma virus) infection    • Hx of blood clots    • Hypokalemia    • Increased frequency of urination    • Infection of skin and subcutaneous tissue    • Irritable bowel syndrome    • Knee pain, left    • Low back pain    • Muscle weakness    • Nausea and vomiting    • Pain in limb    • Pain in wrist    • Peripheral arterial disease (CMS/HCC)    • Postartificial menopausal syndrome    • Postmenopausal state    • Serous otitis media    • Skin lesion    • Smokes tobacco daily    • Upper respiratory infection    • Urinary tract infection    • Vaginitis           Family History   Problem Relation Age of Onset   • ADD / ADHD Daughter    • Lung cancer Maternal Grandmother    • Cancer Paternal Grandmother    • Uterine cancer Paternal Grandmother    • Colon cancer Paternal Grandmother    • Diabetes Other    • Liver cancer Other    • Breast cancer Mother    • Ovarian cancer Mother    • Birth defects Brother    • Stroke Paternal Grandfather    • Endometrial cancer Neg Hx         Review of Systems   Constitutional: Positive for appetite change. Negative for activity change, fatigue, fever and unexpected weight change.   HENT: Negative for congestion, ear discharge, ear pain, nosebleeds, postnasal drip, rhinorrhea, sinus pressure, sinus pain, sneezing, tinnitus and trouble swallowing.    Eyes: Negative.    Respiratory: Negative for cough, chest tightness, shortness of breath and wheezing.    Cardiovascular: Negative for palpitations and leg swelling.   Gastrointestinal: Negative for abdominal distention, blood in stool,  "constipation and diarrhea.   Genitourinary: Negative for difficulty urinating, dyspareunia, dysuria, flank pain, frequency, hematuria, menstrual problem, vaginal bleeding, vaginal discharge and vaginal pain.        Bladder spasms   Musculoskeletal: Positive for arthralgias, back pain and gait problem.   Skin: Negative for wound.   Allergic/Immunologic: Negative for environmental allergies, food allergies and immunocompromised state.   Neurological: Positive for numbness. Negative for dizziness, tremors, seizures, syncope and light-headedness.   Hematological: Does not bruise/bleed easily.   Psychiatric/Behavioral: Positive for sleep disturbance. Negative for behavioral problems, self-injury and suicidal ideas. The patient is nervous/anxious.        Objective   Vitals:    12/17/18 1045   BP: 120/82   BP Location: Left arm   Patient Position: Sitting   Cuff Size: Adult   Pulse: 81   Resp: 16   Temp: 97.6 °F (36.4 °C)   TempSrc: Oral   SpO2: 100%   Weight: 96.6 kg (213 lb)   Height: 160 cm (63\")   PainSc:   2     Physical Exam   Constitutional: She is oriented to person, place, and time. She appears well-developed and well-nourished. She is cooperative. She does not appear ill.   HENT:   Head: Normocephalic and atraumatic.   Right Ear: Hearing, tympanic membrane, external ear and ear canal normal. No drainage. Tympanic membrane is not injected, not erythematous, not retracted and not bulging. No decreased hearing is noted.   Left Ear: Hearing, tympanic membrane, external ear and ear canal normal. No drainage. Tympanic membrane is not injected, not erythematous, not retracted and not bulging. No decreased hearing is noted.   Nose: No mucosal edema or rhinorrhea. Right sinus exhibits no maxillary sinus tenderness and no frontal sinus tenderness. Left sinus exhibits no maxillary sinus tenderness and no frontal sinus tenderness.   Mouth/Throat: Uvula is midline and mucous membranes are normal. No oropharyngeal exudate. "   Eyes: Conjunctivae, EOM and lids are normal. Pupils are equal, round, and reactive to light. Right eye exhibits no discharge. Left eye exhibits no discharge. Right conjunctiva is not injected. Left conjunctiva is not injected.   Neck: Normal range of motion. Neck supple.   Cardiovascular: Normal rate, regular rhythm, normal heart sounds and intact distal pulses. Exam reveals no gallop and no friction rub.   No murmur heard.  Pulmonary/Chest: Effort normal. No respiratory distress. She has no decreased breath sounds. She has no wheezes. She has no rales. She exhibits no tenderness.   Abdominal: Soft. Normal appearance, normal aorta and bowel sounds are normal. She exhibits no shifting dullness, no distension, no pulsatile liver, no fluid wave, no abdominal bruit, no ascites, no pulsatile midline mass and no mass. There is no hepatosplenomegaly. There is no tenderness. There is no rebound, no guarding, no CVA tenderness, no tenderness at McBurney's point and negative Ramirez's sign. No hernia.   Musculoskeletal: She exhibits no edema or deformity.        Thoracic back: She exhibits decreased range of motion, tenderness and pain. She exhibits no swelling (no swelling noted).        Lumbar back: She exhibits decreased range of motion, tenderness and pain. She exhibits no swelling (no swelling ntoed).   There is hypermobility of left patella   Lymphadenopathy:     She has no cervical adenopathy.   Neurological: She is alert and oriented to person, place, and time. She has normal strength and normal reflexes. She displays normal reflexes. No cranial nerve deficit or sensory deficit. She exhibits normal muscle tone. She displays a negative Romberg sign. Coordination normal.   Reflex Scores:       Patellar reflexes are 2+ on the right side and 2+ on the left side.  Wobbling gait at times due to left knee pain.    Skin: Skin is warm, dry and intact. Capillary refill takes 2 to 3 seconds. No rash noted. She is not  diaphoretic. No erythema. No pallor.   Psychiatric: She has a normal mood and affect. Her speech is normal and behavior is normal. Judgment and thought content normal. Cognition and memory are normal.   Patient is dressed appropriately for weather and situation, makes eye contact, and engages in conversation.  She is attentive.   Nursing note and vitals reviewed.      Assessment/Plan   Veronica was seen today for follow-up and foot injury.    Diagnoses and all orders for this visit:    Paresthesia of both hands  -     EMG 2 Limbs; Future  -     Ambulatory Referral to Neurology    Bipolar affective disorder, remission status unspecified (CMS/HCC)    Morbidly obese (CMS/HCC)    Paresthesia of both feet  -     EMG 2 Limbs; Future  -     Ambulatory Referral to Neurology    Raynaud's phenomenon without gangrene           PHQ-2/PHQ-9 Depression Screening 10/5/2018   Little interest or pleasure in doing things 2   Feeling down, depressed, or hopeless 2   Trouble falling or staying asleep, or sleeping too much 3   Feeling tired or having little energy 3   Poor appetite or overeating 3   Feeling bad about yourself - or that you are a failure or have let yourself or your family down 2   Trouble concentrating on things, such as reading the newspaper or watching television 2   Moving or speaking so slowly that other people could have noticed. Or the opposite - being so fidgety or restless that you have been moving around a lot more than usual 3   Thoughts that you would be better off dead, or of hurting yourself in some way 1   Total Score 21   If you checked off any problems, how difficult have these problems made it for you to do your work, take care of things at home, or get along with other people? Somewhat difficult       ABHISHEK Alfredo         Return in about 4 weeks (around 1/14/2019).    Patient Instructions   Referral sent for EMG testing of both arms and legs with Dr SPENSER Bauer as well as referral to determine  cause of paresthesia of both sets of extremities. Also to investigate possibility of Raynaud phenomenon.     Follow up several days after seeing Dr Bauer.      Stop the Pletal (cilostazol).         Office Visit on 11/09/2018   Component Date Value Ref Range Status   • Glucose 11/09/2018 91  74 - 99 mg/dL Final   • BUN 11/09/2018 10  7 - 17 mg/dL Final   • Creatinine 11/09/2018 0.88  0.52 - 1.04 mg/dL Final   • Sodium 11/09/2018 143  137 - 145 mmol/L Final   • Potassium 11/09/2018 4.2  3.4 - 5.0 mmol/L Final   • Chloride 11/09/2018 110* 98 - 107 mmol/L Final   • CO2 11/09/2018 26.0  22.0 - 30.0 mmol/L Final   • Calcium 11/09/2018 9.6  8.4 - 10.2 mg/dL Final   • Total Protein 11/09/2018 8.1  6.3 - 8.2 g/dL Final   • Albumin 11/09/2018 4.40  3.50 - 5.00 g/dL Final   • ALT (SGPT) 11/09/2018 40* <=35 U/L Final   • AST (SGOT) 11/09/2018 23  14 - 36 U/L Final   • Alkaline Phosphatase 11/09/2018 73  38 - 126 U/L Final   • Total Bilirubin 11/09/2018 0.4  0.2 - 1.3 mg/dL Final   • eGFR Non African Amer 11/09/2018 75  64 - 149 mL/min/1.73 Final   • Globulin 11/09/2018 3.7* 2.3 - 3.5 gm/dL Final   • A/G Ratio 11/09/2018 1.2  1.1 - 1.8 g/dL Final   • BUN/Creatinine Ratio 11/09/2018 11.4  7.0 - 25.0 Final   • Anion Gap 11/09/2018 7.0  5.0 - 15.0 mmol/L Final   • D-Dimer, Quantitative 11/09/2018 <100  ng/mL (FEU) Final   • Color, UA 11/09/2018 Yellow  Yellow, Straw Final   • Appearance, UA 11/09/2018 Clear  Clear Final   • pH, UA 11/09/2018 5.5  5.5 - 8.0 Final   • Specific Gravity, UA 11/09/2018 >=1.030  1.005 - 1.030 Final   • Glucose, UA 11/09/2018 Negative  Negative Final   • Ketones, UA 11/09/2018 Negative  Negative Final   • Bilirubin, UA 11/09/2018 Negative  Negative Final   • Blood, UA 11/09/2018 Negative  Negative Final   • Protein, UA 11/09/2018 Negative  Negative Final   • Leuk Esterase, UA 11/09/2018 Negative  Negative Final   • Nitrite, UA 11/09/2018 Negative  Negative Final   • Urobilinogen, UA 11/09/2018 0.2 E.U./dL   0.2 - 1.0 E.U./dL Final   • WBC 11/09/2018 10.91* 3.20 - 9.80 10*3/mm3 Final   • RBC 11/09/2018 5.39* 3.77 - 5.16 10*6/mm3 Final   • Hemoglobin 11/09/2018 14.4  12.0 - 15.5 g/dL Final   • Hematocrit 11/09/2018 44.3  35.0 - 45.0 % Final   • MCV 11/09/2018 82.2  80.0 - 98.0 fL Final   • MCH 11/09/2018 26.7  26.5 - 34.0 pg Final   • MCHC 11/09/2018 32.5  31.4 - 36.0 g/dL Final   • RDW 11/09/2018 14.3  11.5 - 14.5 % Final   • RDW-SD 11/09/2018 43.0  36.4 - 46.3 fl Final   • MPV 11/09/2018 9.1  8.0 - 12.0 fL Final   • Platelets 11/09/2018 322  150 - 450 10*3/mm3 Final   • Neutrophil % 11/09/2018 61.8  37.0 - 80.0 % Final   • Lymphocyte % 11/09/2018 26.9  10.0 - 50.0 % Final   • Monocyte % 11/09/2018 5.8  0.0 - 12.0 % Final   • Eosinophil % 11/09/2018 5.2  0.0 - 7.0 % Final   • Basophil % 11/09/2018 0.3  0.0 - 2.0 % Final   • Neutrophils, Absolute 11/09/2018 6.74  2.00 - 8.60 10*3/mm3 Final   • Lymphocytes, Absolute 11/09/2018 2.94  0.60 - 4.20 10*3/mm3 Final   • Monocytes, Absolute 11/09/2018 0.63  0.00 - 0.90 10*3/mm3 Final   • Eosinophils, Absolute 11/09/2018 0.57  0.00 - 0.70 10*3/mm3 Final   • Basophils, Absolute 11/09/2018 0.03  0.00 - 0.20 10*3/mm3 Final   • Troponin I 11/09/2018 <0.012  <=0.034 ng/mL Final   Lab on 10/05/2018   Component Date Value Ref Range Status   • Glucose 10/05/2018 88  74 - 99 mg/dL Final   • BUN 10/05/2018 15  7 - 17 mg/dL Final   • Creatinine 10/05/2018 0.92  0.52 - 1.04 mg/dL Final   • Sodium 10/05/2018 139  137 - 145 mmol/L Final   • Potassium 10/05/2018 4.1  3.4 - 5.0 mmol/L Final   • Chloride 10/05/2018 106  98 - 107 mmol/L Final   • CO2 10/05/2018 28.0  22.0 - 30.0 mmol/L Final   • Calcium 10/05/2018 9.9  8.4 - 10.2 mg/dL Final   • Total Protein 10/05/2018 8.1  6.3 - 8.2 g/dL Final   • Albumin 10/05/2018 4.40  3.50 - 5.00 g/dL Final   • ALT (SGPT) 10/05/2018 25  <=35 U/L Final   • AST (SGOT) 10/05/2018 20  14 - 36 U/L Final   • Alkaline Phosphatase 10/05/2018 76  38 - 126 U/L Final    • Total Bilirubin 10/05/2018 0.8  0.2 - 1.3 mg/dL Final   • eGFR Non  Amer 10/05/2018 72  64 - 149 mL/min/1.73 Final   • Globulin 10/05/2018 3.7* 2.3 - 3.5 gm/dL Final   • A/G Ratio 10/05/2018 1.2  1.1 - 1.8 g/dL Final   • BUN/Creatinine Ratio 10/05/2018 16.3  7.0 - 25.0 Final   • Anion Gap 10/05/2018 5.0  5.0 - 15.0 mmol/L Final   Lab on 08/28/2018   Component Date Value Ref Range Status   • Glucose 08/28/2018 99  74 - 99 mg/dL Final   • BUN 08/28/2018 11  7 - 17 mg/dL Final   • Creatinine 08/28/2018 0.92  0.52 - 1.04 mg/dL Final   • Sodium 08/28/2018 140  137 - 145 mmol/L Final   • Potassium 08/28/2018 3.1* 3.4 - 5.0 mmol/L Final   • Chloride 08/28/2018 102  98 - 107 mmol/L Final   • CO2 08/28/2018 29.0  22.0 - 30.0 mmol/L Final   • Calcium 08/28/2018 9.7  8.4 - 10.2 mg/dL Final   • Total Protein 08/28/2018 8.3* 6.3 - 8.2 g/dL Final   • Albumin 08/28/2018 4.50  3.50 - 5.00 g/dL Final   • ALT (SGPT) 08/28/2018 31  <=35 U/L Final   • AST (SGOT) 08/28/2018 27  14 - 36 U/L Final   • Alkaline Phosphatase 08/28/2018 85  38 - 126 U/L Final   • Total Bilirubin 08/28/2018 0.9  0.2 - 1.3 mg/dL Final   • eGFR Non  Amer 08/28/2018 72  64 - 149 mL/min/1.73 Final   • Globulin 08/28/2018 3.8* 2.3 - 3.5 gm/dL Final   • A/G Ratio 08/28/2018 1.2  1.1 - 1.8 g/dL Final   • BUN/Creatinine Ratio 08/28/2018 12.0  7.0 - 25.0 Final   • Anion Gap 08/28/2018 9.0  5.0 - 15.0 mmol/L Final   • Total Cholesterol 08/28/2018 183  150 - 200 mg/dL Final   • Triglycerides 08/28/2018 128  <=150 mg/dL Final   • HDL Cholesterol 08/28/2018 44  40 - 59 mg/dL Final   • LDL Cholesterol  08/28/2018 113* <=100 mg/dL Final   • VLDL Cholesterol 08/28/2018 25.6  mg/dL Final   • LDL/HDL Ratio 08/28/2018 2.58  0.00 - 3.22 Final   • TSH 08/28/2018 2.810  0.460 - 4.680 mIU/mL Final   • Free T4 08/28/2018 1.23  0.78 - 2.19 ng/dL Final   • Vitamin B-12 08/28/2018 454  239 - 931 pg/mL Final   • 25 Hydroxy, Vitamin D 08/28/2018 21.1* 30.0 - 100.0 ng/ml  Final   • Hemoglobin A1C 08/28/2018 5.4  4 - 5.6 % Final   • WBC 08/28/2018 9.59  3.20 - 9.80 10*3/mm3 Final   • RBC 08/28/2018 5.46* 3.77 - 5.16 10*6/mm3 Final   • Hemoglobin 08/28/2018 14.6  12.0 - 15.5 g/dL Final   • Hematocrit 08/28/2018 44.3  35.0 - 45.0 % Final   • MCV 08/28/2018 81.1  80.0 - 98.0 fL Final   • MCH 08/28/2018 26.7  26.5 - 34.0 pg Final   • MCHC 08/28/2018 33.0  31.4 - 36.0 g/dL Final   • RDW 08/28/2018 14.1  11.5 - 14.5 % Final   • RDW-SD 08/28/2018 41.0  36.4 - 46.3 fl Final   • MPV 08/28/2018 8.9  8.0 - 12.0 fL Final   • Platelets 08/28/2018 272  150 - 450 10*3/mm3 Final   • Neutrophil % 08/28/2018 67.2  37.0 - 80.0 % Final   • Lymphocyte % 08/28/2018 18.7  10.0 - 50.0 % Final   • Monocyte % 08/28/2018 7.9  0.0 - 12.0 % Final   • Eosinophil % 08/28/2018 5.9  0.0 - 7.0 % Final   • Basophil % 08/28/2018 0.3  0.0 - 2.0 % Final   • Neutrophils, Absolute 08/28/2018 6.44  2.00 - 8.60 10*3/mm3 Final   • Lymphocytes, Absolute 08/28/2018 1.79  0.60 - 4.20 10*3/mm3 Final   • Monocytes, Absolute 08/28/2018 0.76  0.00 - 0.90 10*3/mm3 Final   • Eosinophils, Absolute 08/28/2018 0.57  0.00 - 0.70 10*3/mm3 Final   • Basophils, Absolute 08/28/2018 0.03  0.00 - 0.20 10*3/mm3 Final   ]

## 2018-12-17 NOTE — PATIENT INSTRUCTIONS
Referral sent for EMG testing of both arms and legs with Dr SPENSER Bauer as well as referral to determine cause of paresthesia of both sets of extremities. Also to investigate possibility of Raynaud phenomenon.     Follow up several days after seeing Dr Bauer.      Stop the Pletal (cilostazol).

## 2019-01-10 DIAGNOSIS — E87.6 HYPOKALEMIA: ICD-10-CM

## 2019-01-10 RX ORDER — POTASSIUM CHLORIDE 750 MG/1
TABLET, FILM COATED, EXTENDED RELEASE ORAL
Qty: 30 TABLET | Refills: 2 | Status: SHIPPED | OUTPATIENT
Start: 2019-01-10 | End: 2019-03-18 | Stop reason: SDUPTHER

## 2019-02-22 ENCOUNTER — OFFICE VISIT (OUTPATIENT)
Dept: FAMILY MEDICINE CLINIC | Facility: CLINIC | Age: 34
End: 2019-02-22

## 2019-02-22 VITALS
BODY MASS INDEX: 38.09 KG/M2 | OXYGEN SATURATION: 98 % | WEIGHT: 215 LBS | RESPIRATION RATE: 18 BRPM | HEIGHT: 63 IN | SYSTOLIC BLOOD PRESSURE: 108 MMHG | HEART RATE: 78 BPM | DIASTOLIC BLOOD PRESSURE: 72 MMHG

## 2019-02-22 DIAGNOSIS — Z13.1 SCREENING FOR DIABETES MELLITUS: ICD-10-CM

## 2019-02-22 DIAGNOSIS — Z13.220 SCREENING FOR HYPERLIPIDEMIA: ICD-10-CM

## 2019-02-22 DIAGNOSIS — R73.9 HYPERGLYCEMIA: ICD-10-CM

## 2019-02-22 DIAGNOSIS — G47.00 INSOMNIA, UNSPECIFIED TYPE: ICD-10-CM

## 2019-02-22 DIAGNOSIS — F41.9 ANXIETY: Primary | Chronic | ICD-10-CM

## 2019-02-22 DIAGNOSIS — E53.8 LOW SERUM VITAMIN B12: ICD-10-CM

## 2019-02-22 DIAGNOSIS — F32.A DEPRESSION, UNSPECIFIED DEPRESSION TYPE: ICD-10-CM

## 2019-02-22 DIAGNOSIS — Z13.29 SCREENING FOR THYROID DISORDER: ICD-10-CM

## 2019-02-22 DIAGNOSIS — E55.9 VITAMIN D DEFICIENCY: ICD-10-CM

## 2019-02-22 DIAGNOSIS — Z13.0 SCREENING FOR DEFICIENCY ANEMIA: ICD-10-CM

## 2019-02-22 PROCEDURE — 99214 OFFICE O/P EST MOD 30 MIN: CPT | Performed by: NURSE PRACTITIONER

## 2019-02-22 RX ORDER — CITALOPRAM 10 MG/1
10 TABLET ORAL DAILY
Qty: 30 TABLET | Refills: 0 | Status: SHIPPED | OUTPATIENT
Start: 2019-02-22 | End: 2019-03-18 | Stop reason: SDUPTHER

## 2019-02-22 RX ORDER — AMITRIPTYLINE HYDROCHLORIDE 10 MG/1
10 TABLET, FILM COATED ORAL NIGHTLY
Qty: 30 TABLET | Refills: 0 | Status: SHIPPED | OUTPATIENT
Start: 2019-02-22 | End: 2019-03-18 | Stop reason: SDUPTHER

## 2019-02-22 RX ORDER — ASPIRIN 81 MG/1
81 TABLET ORAL DAILY
Qty: 30 TABLET | Refills: 5 | Status: SHIPPED | OUTPATIENT
Start: 2019-02-22 | End: 2019-09-16 | Stop reason: SDUPTHER

## 2019-02-23 RX ORDER — ONDANSETRON 4 MG/1
TABLET, FILM COATED ORAL
Qty: 30 TABLET | Refills: 3 | Status: SHIPPED | OUTPATIENT
Start: 2019-02-23 | End: 2019-05-09

## 2019-02-26 ENCOUNTER — OFFICE VISIT (OUTPATIENT)
Dept: FAMILY MEDICINE CLINIC | Facility: CLINIC | Age: 34
End: 2019-02-26

## 2019-02-26 VITALS
RESPIRATION RATE: 16 BRPM | WEIGHT: 212 LBS | TEMPERATURE: 98.7 F | SYSTOLIC BLOOD PRESSURE: 116 MMHG | HEIGHT: 63 IN | DIASTOLIC BLOOD PRESSURE: 82 MMHG | HEART RATE: 88 BPM | BODY MASS INDEX: 37.56 KG/M2 | OXYGEN SATURATION: 98 %

## 2019-02-26 DIAGNOSIS — K58.0 IRRITABLE BOWEL SYNDROME WITH DIARRHEA: ICD-10-CM

## 2019-02-26 DIAGNOSIS — R35.0 URINARY FREQUENCY: ICD-10-CM

## 2019-02-26 DIAGNOSIS — H60.502 ACUTE OTITIS EXTERNA OF LEFT EAR, UNSPECIFIED TYPE: ICD-10-CM

## 2019-02-26 DIAGNOSIS — R82.90 ABNORMAL URINE ODOR: ICD-10-CM

## 2019-02-26 DIAGNOSIS — F32.A DEPRESSION, UNSPECIFIED DEPRESSION TYPE: ICD-10-CM

## 2019-02-26 DIAGNOSIS — F41.9 ANXIETY: Primary | Chronic | ICD-10-CM

## 2019-02-26 DIAGNOSIS — H66.92 LEFT OTITIS MEDIA, UNSPECIFIED OTITIS MEDIA TYPE: ICD-10-CM

## 2019-02-26 DIAGNOSIS — R82.998 DARK URINE: ICD-10-CM

## 2019-02-26 DIAGNOSIS — G47.00 INSOMNIA, UNSPECIFIED TYPE: ICD-10-CM

## 2019-02-26 LAB
BILIRUB UR QL STRIP: NEGATIVE
CLARITY UR: CLEAR
COLOR UR: YELLOW
GLUCOSE UR STRIP-MCNC: NEGATIVE MG/DL
HGB UR QL STRIP.AUTO: NEGATIVE
KETONES UR QL STRIP: NEGATIVE
LEUKOCYTE ESTERASE UR QL STRIP.AUTO: NEGATIVE
NITRITE UR QL STRIP: NEGATIVE
PH UR STRIP.AUTO: 6 [PH] (ref 5.5–8)
PROT UR QL STRIP: NEGATIVE
SP GR UR STRIP: 1.02 (ref 1–1.03)
UROBILINOGEN UR QL STRIP: NORMAL

## 2019-02-26 PROCEDURE — 99214 OFFICE O/P EST MOD 30 MIN: CPT | Performed by: NURSE PRACTITIONER

## 2019-02-26 PROCEDURE — 81003 URINALYSIS AUTO W/O SCOPE: CPT | Performed by: NURSE PRACTITIONER

## 2019-02-26 RX ORDER — OFLOXACIN 3 MG/ML
5 SOLUTION AURICULAR (OTIC) 2 TIMES DAILY
Qty: 10 ML | Refills: 0 | Status: SHIPPED | OUTPATIENT
Start: 2019-02-26 | End: 2019-03-05

## 2019-02-26 RX ORDER — DICYCLOMINE HYDROCHLORIDE 10 MG/1
10 CAPSULE ORAL 3 TIMES DAILY PRN
Qty: 90 CAPSULE | Refills: 0 | Status: SHIPPED | OUTPATIENT
Start: 2019-02-26 | End: 2019-03-18 | Stop reason: SDUPTHER

## 2019-02-26 RX ORDER — DOXYCYCLINE 100 MG/1
100 TABLET ORAL 2 TIMES DAILY
Qty: 20 TABLET | Refills: 0 | Status: SHIPPED | OUTPATIENT
Start: 2019-02-26 | End: 2019-03-18

## 2019-02-28 ENCOUNTER — TELEPHONE (OUTPATIENT)
Dept: FAMILY MEDICINE CLINIC | Facility: CLINIC | Age: 34
End: 2019-02-28

## 2019-02-28 RX ORDER — OXYBUTYNIN CHLORIDE 5 MG/1
5 TABLET, EXTENDED RELEASE ORAL DAILY
Qty: 30 TABLET | Refills: 0 | Status: SHIPPED | OUTPATIENT
Start: 2019-02-28 | End: 2019-03-18

## 2019-02-28 NOTE — TELEPHONE ENCOUNTER
Pt is asking for refill on compund med [facility] I dont see anything in her med list

## 2019-02-28 NOTE — TELEPHONE ENCOUNTER
Called pt relayed results and she stated that she would try the med's for over active bladder and she also requested a refill which I will send separate because it goes to a different pharmacy.

## 2019-02-28 NOTE — TELEPHONE ENCOUNTER
----- Message from ABHISHEK Bowman sent at 2/26/2019  5:54 PM CST -----  Negative urine sample. We could try something for overactive bladder and see if it helps?

## 2019-03-04 PROBLEM — M25.571 ACUTE RIGHT ANKLE PAIN: Status: RESOLVED | Noted: 2017-07-05 | Resolved: 2019-03-04

## 2019-03-04 PROBLEM — R11.2 NON-INTRACTABLE VOMITING WITH NAUSEA: Chronic | Status: RESOLVED | Noted: 2017-11-14 | Resolved: 2019-03-04

## 2019-03-04 PROBLEM — I73.9 PAD (PERIPHERAL ARTERY DISEASE) (HCC): Status: RESOLVED | Noted: 2018-01-17 | Resolved: 2019-03-04

## 2019-03-04 PROBLEM — R19.7 DIARRHEA: Status: RESOLVED | Noted: 2017-11-14 | Resolved: 2019-03-04

## 2019-03-04 PROBLEM — R06.02 SOB (SHORTNESS OF BREATH): Status: RESOLVED | Noted: 2018-03-19 | Resolved: 2019-03-04

## 2019-03-04 PROBLEM — E53.8 LOW SERUM VITAMIN B12: Status: ACTIVE | Noted: 2019-03-04

## 2019-03-04 PROBLEM — M79.671 PAIN OF RIGHT HEEL: Status: RESOLVED | Noted: 2017-10-02 | Resolved: 2019-03-04

## 2019-03-04 PROBLEM — R10.84 GENERALIZED ABDOMINAL PAIN: Status: RESOLVED | Noted: 2017-11-14 | Resolved: 2019-03-04

## 2019-03-04 PROBLEM — E16.2 HYPOGLYCEMIA: Chronic | Status: RESOLVED | Noted: 2018-02-12 | Resolved: 2019-03-04

## 2019-03-04 PROBLEM — E66.01 MORBIDLY OBESE (HCC): Status: RESOLVED | Noted: 2018-12-17 | Resolved: 2019-03-04

## 2019-03-04 PROBLEM — M79.10 MUSCLE SORENESS: Status: RESOLVED | Noted: 2017-10-02 | Resolved: 2019-03-04

## 2019-03-04 PROBLEM — R07.89 OTHER CHEST PAIN: Status: RESOLVED | Noted: 2018-03-19 | Resolved: 2019-03-04

## 2019-03-04 PROBLEM — S29.011A CHEST WALL MUSCLE STRAIN: Status: RESOLVED | Noted: 2017-10-02 | Resolved: 2019-03-04

## 2019-03-04 PROBLEM — E55.9 VITAMIN D DEFICIENCY: Status: ACTIVE | Noted: 2019-03-04

## 2019-03-04 NOTE — PROGRESS NOTES
"Subjective   Veronicaclaude Hicks is a 33 y.o. female who presents to the office for f/u.    History of Present Illness     Will order six [facility] labs to be done on or after 2/29/19 today.     Anxiety/insomnia/Depression-chronic, mildly controlled without medications.   -hx of being on several meds (failed amitriptyline too sleep, worried about seroquel so pt doesn't want to try it, gabapentin po gives pt hallucinations, failed due to ineffectiveness on cymbalta, buspar, risperdal, trazodone, and prozac. Hx of seeing pennyrile, pt states dx of bipolar affective. Effexor caused serious AE. Pt also states see has been on wellbutrin but doesn't remember if it worked or not.  -pt agreeable to try celexa because she has been on it before and will try something at night time for sleep.     Pt needs a letter for SI stating what her medical diagnoses are.      PMH:   Chronic AR-controlled with flonase and claritin  Chronic pain of left knee causing unstable gait for which she uses a cane.  Vitamin B12 deficiency-chronic, controlled with vitamin B12 injections.  Vitamin D deficiency-chronic, controlled with vitamin D weekly.  Peripheral arterial disease-NOT dx anymore per Dr. Crawford (updated LEONID)  Thoracic/lumbar back pain-acute, improving with robaxin and diclofenac and compound. (flexeril doesn't work, gabapentin PO causes hallucinations.)  Hyperglycemia-with boats of hypoglycemia-chronic, mildly controlled with diet. 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    Review of Systems   Constitutional: Negative for activity change, appetite change, chills, diaphoresis, fatigue, fever and unexpected weight change.   HENT: Negative for congestion, ear discharge, ear pain, nosebleeds, postnasal drip, " rhinorrhea, sinus pressure, sinus pain, sneezing, tinnitus and trouble swallowing.    Eyes: Negative.    Respiratory: Positive for shortness of breath. Negative for cough, chest tightness and wheezing.    Cardiovascular: Negative for chest pain, palpitations and leg swelling.   Gastrointestinal: Negative for abdominal distention, blood in stool, constipation and diarrhea.   Genitourinary: Negative for difficulty urinating, dyspareunia, dysuria, flank pain, frequency, hematuria, menstrual problem, vaginal bleeding, vaginal discharge and vaginal pain.        Bladder spasms   Musculoskeletal: Positive for arthralgias, back pain and gait problem. Negative for neck pain and neck stiffness.   Skin: Negative for wound.   Allergic/Immunologic: Negative for environmental allergies, food allergies and immunocompromised state.   Neurological: Positive for headaches. Negative for dizziness, tremors, seizures, syncope, weakness, light-headedness and numbness.   Hematological: Does not bruise/bleed easily.   Psychiatric/Behavioral: Positive for sleep disturbance. Negative for behavioral problems, self-injury and suicidal ideas. The patient is nervous/anxious.        Past Medical History:   Diagnosis Date   • Abdominal pain    • Abnormal Pap smear of cervix    • Acute bronchitis    • Acute left otitis media    • Ankle pain    • Anxiety    • Asthma    • Attention deficit hyperactivity disorder    • Bipolar disorder (CMS/HCC)    • Candidiasis    • Candidiasis of vagina    • Depression    • Diabetes mellitus (CMS/HCC)    • Diarrhea    • Dizziness    • Dysuria    • Elbow joint pain     elbow joint - painful on movement   • Elbow joint pain    • Elevated blood pressure    • Encounter for long-term (current) use of medications     Long-term (current) use of other medications    • Epigastric pain    • Excessive thirst    • Fall    • Fatigue    • Feeling tired     tired all the time   • Flank pain    • Gastroesophageal reflux disease    •  "High risk sexual behavior    • History of malignant neoplasm of cervix    • Hordeolum internum right upper eyelid    • HPV (human papilloma virus) infection    • Hx of blood clots    • Hypokalemia    • Increased frequency of urination    • Infection of skin and subcutaneous tissue    • Irritable bowel syndrome    • Knee pain, left    • Low back pain    • Muscle weakness    • Nausea and vomiting    • Pain in limb    • Pain in wrist    • Peripheral arterial disease (CMS/HCC)    • Postartificial menopausal syndrome    • Postmenopausal state    • Serous otitis media    • Skin lesion    • Smokes tobacco daily    • Upper respiratory infection    • Urinary tract infection    • Vaginitis        Family History   Problem Relation Age of Onset   • ADD / ADHD Daughter    • Lung cancer Maternal Grandmother    • Cancer Paternal Grandmother    • Uterine cancer Paternal Grandmother    • Colon cancer Paternal Grandmother    • Diabetes Other    • Liver cancer Other    • Breast cancer Mother    • Ovarian cancer Mother    • Birth defects Brother    • Stroke Paternal Grandfather    • Endometrial cancer Neg Hx           Objective   /72   Pulse 78   Resp 18   Ht 158.8 cm (62.5\")   Wt 97.5 kg (215 lb)   SpO2 98%   BMI 38.70 kg/m²   Physical Exam   Constitutional: She is oriented to person, place, and time. She appears well-developed and well-nourished. She is cooperative. She does not appear ill.   HENT:   Head: Normocephalic.   Right Ear: Hearing, tympanic membrane, external ear and ear canal normal. No drainage. Tympanic membrane is not injected, not erythematous, not retracted and not bulging. No decreased hearing is noted.   Left Ear: Hearing, tympanic membrane, external ear and ear canal normal. No drainage. Tympanic membrane is not injected, not erythematous, not retracted and not bulging. No decreased hearing is noted.   Nose: No mucosal edema or rhinorrhea. Right sinus exhibits no maxillary sinus tenderness and no " frontal sinus tenderness. Left sinus exhibits no maxillary sinus tenderness and no frontal sinus tenderness.   Mouth/Throat: Uvula is midline and mucous membranes are normal. No oropharyngeal exudate.   Eyes: Conjunctivae and lids are normal. Pupils are equal, round, and reactive to light. Right eye exhibits no discharge. Left eye exhibits no discharge. Right conjunctiva is not injected. Left conjunctiva is not injected. Right eye exhibits normal extraocular motion and no nystagmus. Left eye exhibits normal extraocular motion and no nystagmus.   Neck: Normal range of motion. Neck supple.   Cardiovascular: Normal rate, regular rhythm, normal heart sounds and intact distal pulses. Exam reveals no gallop and no friction rub.   No murmur heard.  Pulmonary/Chest: Effort normal. No respiratory distress. She has no decreased breath sounds. She has wheezes. She has no rales.   Abdominal: Soft. Normal appearance, normal aorta and bowel sounds are normal. She exhibits no shifting dullness, no distension, no pulsatile liver, no fluid wave, no abdominal bruit, no ascites, no pulsatile midline mass and no mass. There is no hepatosplenomegaly. There is no tenderness. There is no rebound, no guarding, no CVA tenderness, no tenderness at McBurney's point and negative Ramirez's sign. No hernia.   Musculoskeletal: She exhibits no edema or deformity.        Cervical back: She exhibits decreased range of motion (due to stiffness), tenderness, bony tenderness, pain and spasm. She exhibits no swelling, no edema, no deformity, no laceration and normal pulse.        Thoracic back: She exhibits decreased range of motion, tenderness and pain. She exhibits no swelling (no swelling noted).        Lumbar back: She exhibits decreased range of motion, tenderness and pain. She exhibits no swelling (no swelling ntoed).   Lymphadenopathy:     She has no cervical adenopathy.   Neurological: She is alert and oriented to person, place, and time. She has  normal strength and normal reflexes. She displays normal reflexes. No sensory deficit.   Reflex Scores:       Patellar reflexes are 2+ on the right side and 2+ on the left side.  Wobbling gait at times due to left knee pain, pt ambulatory with walker.    Skin: Skin is warm, dry and intact. No rash noted. She is not diaphoretic. No erythema. No pallor.   Psychiatric: Her behavior is normal. Thought content normal. Her mood appears anxious. Her speech is rapid and/or pressured. Cognition and memory are normal.   Patient is dressed appropriately for weather and situation, makes eye contact, and engages in conversation. Pt seems very anxious and cries during visit.  She is attentive.   Nursing note and vitals reviewed.       PHQ-2/PHQ-9 Depression Screening 2/26/2019   Little interest or pleasure in doing things 0   Feeling down, depressed, or hopeless 0   Trouble falling or staying asleep, or sleeping too much -   Feeling tired or having little energy -   Poor appetite or overeating -   Feeling bad about yourself - or that you are a failure or have let yourself or your family down -   Trouble concentrating on things, such as reading the newspaper or watching television -   Moving or speaking so slowly that other people could have noticed. Or the opposite - being so fidgety or restless that you have been moving around a lot more than usual -   Thoughts that you would be better off dead, or of hurting yourself in some way -   Total Score 0   If you checked off any problems, how difficult have these problems made it for you to do your work, take care of things at home, or get along with other people? -         Assessment/Plan   Veronica was seen today for follow-up.    Diagnoses and all orders for this visit:    Anxiety    Hyperglycemia  Comments:  minimal on left leg  Orders:  -     aspirin 81 MG EC tablet; Take 1 tablet by mouth Daily. For PAD  -     Comprehensive Metabolic Panel; Future  -     Hemoglobin A1c;  Future    Depression, unspecified depression type    Insomnia, unspecified type    Screening for hyperlipidemia  -     Lipid Panel; Future    Screening for thyroid disorder  -     T4, Free; Future  -     TSH; Future    Screening for deficiency anemia  -     CBC & Differential; Future    Screening for diabetes mellitus  -     Comprehensive Metabolic Panel; Future  -     Hemoglobin A1c; Future    Vitamin D deficiency  -     Vitamin D 25 Hydroxy; Future    Low serum vitamin B12  Comments:  history of per pt  Orders:  -     Vitamin B12; Future    Other orders  -     citalopram (CeleXA) 10 MG tablet; Take 1 tablet by mouth Daily.  -     amitriptyline (ELAVIL) 10 MG tablet; Take 1 tablet by mouth Every Night.             Will order six [facility] labs to be done on or after 2/29/19 today.     Anxiety/insomnia/Depression-chronic, mildly controlled without medications.   -celexa for anx/dep during day, elavil at hs for sleep    Letter provided for SI stating pt's medical diagnoses are.     F/u in one mtn after lab work.     Patient educated to follow-up sooner than next scheduled appointment if condition(s) worse or do not improve. Patient states understanding and is in agreeance with plan of care. An After Visit Summary was printed and given to the patient.      ABHISHEK Bowman        This document has been electronically signed by ABHISHEK Bowman on March 4, 2019 7:55 AM      EMR/Transcription Dragon Disclaimer:  Some of this note may be an electronic dragon transcription/translation of spoken language to printed text. The electronic translation of spoken language may permit erroneous, or at times, nonsensical words or phrases to be inadvertently transcribed. Although I have reviewed the note for such errors, some may still exist.

## 2019-03-05 NOTE — PROGRESS NOTES
Subjective   Veronica Hicks is a 33 y.o. female who presents to the office for f/u.    Earache    Associated symptoms include headaches. Pertinent negatives include no coughing, diarrhea, ear discharge, neck pain or rhinorrhea.     Six [facility] labs to be done on or after 2/29/19 today.      Anxiety/insomnia/Depression-chronic,  improving on amitriptyline and Celexa.  -hx of being on several meds (failed amitriptyline too sleep, worried about seroquel so pt doesn't want to try it, gabapentin po gives pt hallucinations, failed due to ineffectiveness on cymbalta, buspar, risperdal, trazodone, and prozac. Hx of seeing pennyrile, pt states dx of bipolar affective. Effexor caused serious AE. Pt also states see has been on wellbutrin but doesn't remember if it worked or not.     Patient states that she has had potent smelling dark urine for the past several months, denies any dysuria, hematuria, or flank pain.    Patient states she has a history of IBS for several years, does not do well with milk, states that she has stomach cramps and diarrhea usually 2-3 times a day sometimes worse.    Patient complains of both ears burning and having pressure for the past 5 days, denies sinus pain and pressure or congestion, denies any respiratory or abdominal issues related to this.  Is on 2 different allergy medications and they are not working.    Patient needs help with answering some questions on medical history for her SI paperwork.     PMH:   Chronic AR-controlled with flonase and claritin  Chronic pain of left knee causing unstable gait for which she uses a cane.  Vitamin B12 deficiency-chronic, controlled with vitamin B12 injections.  Vitamin D deficiency-chronic, controlled with vitamin D weekly.  Peripheral arterial disease-NOT dx anymore per Dr. Crawford (updated LEONID)  Thoracic/lumbar back pain-acute, improving with robaxin and diclofenac and compound. (flexeril doesn't work, gabapentin PO causes  "hallucinations.)  Hyperglycemia-with boats of hypoglycemia-chronic, mildly controlled with diet. 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    Review of Systems   Constitutional: Negative for activity change, appetite change, chills, diaphoresis, fatigue, fever and unexpected weight change.   HENT: Positive for ear pain. Negative for congestion, ear discharge, nosebleeds, postnasal drip, rhinorrhea, sinus pressure, sinus pain, sneezing, tinnitus and trouble swallowing.    Eyes: Negative.    Respiratory: Positive for shortness of breath. Negative for cough, chest tightness and wheezing.    Cardiovascular: Negative for chest pain, palpitations and leg swelling.   Gastrointestinal: Negative for abdominal distention, blood in stool, constipation and diarrhea.   Genitourinary: Positive for frequency. Negative for difficulty urinating, dyspareunia, dysuria, flank pain, hematuria, menstrual problem, vaginal bleeding, vaginal discharge and vaginal pain.        Bladder spasms   Musculoskeletal: Positive for arthralgias, back pain and gait problem. Negative for neck pain and neck stiffness.   Skin: Negative for wound.   Allergic/Immunologic: Negative for environmental allergies, food allergies and immunocompromised state.   Neurological: Positive for headaches. Negative for dizziness, tremors, seizures, syncope, weakness, light-headedness and numbness.   Hematological: Does not bruise/bleed easily.   Psychiatric/Behavioral: Positive for sleep disturbance (Improving). Negative for behavioral problems, self-injury and suicidal ideas. The patient is nervous/anxious (Improving).        Past Medical History:   Diagnosis Date   • Abdominal pain    • Abnormal Pap smear of cervix    • Acute bronchitis    • " "Acute left otitis media    • Ankle pain    • Anxiety    • Asthma    • Attention deficit hyperactivity disorder    • Bipolar disorder (CMS/HCC)    • Candidiasis    • Candidiasis of vagina    • Depression    • Diabetes mellitus (CMS/HCC)    • Diarrhea    • Dizziness    • Dysuria    • Elbow joint pain     elbow joint - painful on movement   • Elbow joint pain    • Elevated blood pressure    • Encounter for long-term (current) use of medications     Long-term (current) use of other medications    • Epigastric pain    • Excessive thirst    • Fall    • Fatigue    • Feeling tired     tired all the time   • Flank pain    • Gastroesophageal reflux disease    • High risk sexual behavior    • History of malignant neoplasm of cervix    • Hordeolum internum right upper eyelid    • HPV (human papilloma virus) infection    • Hx of blood clots    • Hypokalemia    • Increased frequency of urination    • Infection of skin and subcutaneous tissue    • Irritable bowel syndrome    • Knee pain, left    • Low back pain    • Muscle weakness    • Nausea and vomiting    • Pain in limb    • Pain in wrist    • Peripheral arterial disease (CMS/HCC)    • Postartificial menopausal syndrome    • Postmenopausal state    • Serous otitis media    • Skin lesion    • Smokes tobacco daily    • Upper respiratory infection    • Urinary tract infection    • Vaginitis        Family History   Problem Relation Age of Onset   • ADD / ADHD Daughter    • Lung cancer Maternal Grandmother    • Cancer Paternal Grandmother    • Uterine cancer Paternal Grandmother    • Colon cancer Paternal Grandmother    • Diabetes Other    • Liver cancer Other    • Breast cancer Mother    • Ovarian cancer Mother    • Birth defects Brother    • Stroke Paternal Grandfather    • Endometrial cancer Neg Hx           Objective   /82   Pulse 88   Temp 98.7 °F (37.1 °C) (Temporal)   Resp 16   Ht 160 cm (63\")   Wt 96.2 kg (212 lb)   SpO2 98%   Breastfeeding? No   BMI 37.55 " kg/m²   Physical Exam   Constitutional: She is oriented to person, place, and time. She appears well-developed and well-nourished. She is cooperative. She does not appear ill.   HENT:   Head: Normocephalic.   Right Ear: Hearing, external ear and ear canal normal. No drainage. Tympanic membrane is bulging. Tympanic membrane is not injected, not erythematous and not retracted. No decreased hearing is noted.   Left Ear: Hearing, external ear and ear canal normal. There is swelling and tenderness. No drainage. Tympanic membrane is erythematous and bulging. Tympanic membrane is not injected and not retracted. A middle ear effusion is present. No decreased hearing is noted.   Nose: No mucosal edema or rhinorrhea. Right sinus exhibits no maxillary sinus tenderness and no frontal sinus tenderness. Left sinus exhibits no maxillary sinus tenderness and no frontal sinus tenderness.   Mouth/Throat: Uvula is midline and mucous membranes are normal. No oropharyngeal exudate.   Eyes: Conjunctivae and lids are normal. Pupils are equal, round, and reactive to light. Right eye exhibits no discharge. Left eye exhibits no discharge. Right conjunctiva is not injected. Left conjunctiva is not injected. Right eye exhibits normal extraocular motion and no nystagmus. Left eye exhibits normal extraocular motion and no nystagmus.   Neck: Normal range of motion. Neck supple.   Cardiovascular: Normal rate, regular rhythm, normal heart sounds and intact distal pulses. Exam reveals no gallop and no friction rub.   No murmur heard.  Pulmonary/Chest: Effort normal. No respiratory distress. She has no decreased breath sounds. She has wheezes. She has no rales.   Abdominal: Soft. Normal appearance, normal aorta and bowel sounds are normal. She exhibits no shifting dullness, no distension, no pulsatile liver, no fluid wave, no abdominal bruit, no ascites, no pulsatile midline mass and no mass. There is no hepatosplenomegaly. There is generalized  tenderness. There is no rebound, no guarding, no CVA tenderness, no tenderness at McBurney's point and negative Ramirez's sign. No hernia.   Musculoskeletal: She exhibits no edema or deformity.        Cervical back: She exhibits decreased range of motion (due to stiffness), tenderness, bony tenderness, pain and spasm. She exhibits no swelling, no edema, no deformity, no laceration and normal pulse.        Thoracic back: She exhibits decreased range of motion, tenderness and pain. She exhibits no swelling (no swelling noted).        Lumbar back: She exhibits decreased range of motion, tenderness and pain. She exhibits no swelling (no swelling ntoed).   Lymphadenopathy:     She has no cervical adenopathy.   Neurological: She is alert and oriented to person, place, and time. She has normal strength and normal reflexes. She displays normal reflexes. No sensory deficit.   Reflex Scores:       Patellar reflexes are 2+ on the right side and 2+ on the left side.  Wobbling gait at times due to left knee pain, pt ambulatory with walker.    Skin: Skin is warm, dry and intact. No rash noted. She is not diaphoretic. No erythema. No pallor.   Psychiatric: Her behavior is normal. Thought content normal. Her mood appears anxious. Her speech is rapid and/or pressured. Cognition and memory are normal.   Patient is dressed appropriately for weather and situation, makes eye contact, and engages in conversation. Pt seems very anxious and cries during visit.  She is attentive.   Nursing note and vitals reviewed.       PHQ-2/PHQ-9 Depression Screening 2/26/2019   Little interest or pleasure in doing things 0   Feeling down, depressed, or hopeless 0   Trouble falling or staying asleep, or sleeping too much -   Feeling tired or having little energy -   Poor appetite or overeating -   Feeling bad about yourself - or that you are a failure or have let yourself or your family down -   Trouble concentrating on things, such as reading the  newspaper or watching television -   Moving or speaking so slowly that other people could have noticed. Or the opposite - being so fidgety or restless that you have been moving around a lot more than usual -   Thoughts that you would be better off dead, or of hurting yourself in some way -   Total Score 0   If you checked off any problems, how difficult have these problems made it for you to do your work, take care of things at home, or get along with other people? -         Assessment/Plan   Veronica was seen today for earache.    Diagnoses and all orders for this visit:    Anxiety    Urinary frequency  -     Urinalysis With Culture If Indicated - Urine, Clean Catch    Depression, unspecified depression type    Insomnia, unspecified type    Irritable bowel syndrome with diarrhea    Abnormal urine odor    Dark urine    Acute otitis externa of left ear, unspecified type    Left otitis media, unspecified otitis media type    Other orders  -     dicyclomine (BENTYL) 10 MG capsule; Take 1 capsule by mouth 3 (Three) Times a Day As Needed (diarrhea/stomach cramps).  -     doxycycline (ADOXA) 100 MG tablet; Take 1 tablet by mouth 2 (Two) Times a Day.  -     ofloxacin (FLOXIN) 0.3 % otic solution; Administer 5 drops into both ears 2 (Two) Times a Day for 7 days.             [facility] labs to be done on or after 2/29/19 today.      Anxiety/insomnia/Depression-chronic,  improving on amitriptyline and Celexa.  -hx of being on several meds (failed amitriptyline too sleep, worried about seroquel so pt doesn't want to try it, gabapentin po gives pt hallucinations, failed due to ineffectiveness on cymbalta, buspar, risperdal, trazodone, and prozac. Hx of seeing pennyrile, pt states dx of bipolar affective. Effexor caused serious AE. Pt also states see has been on wellbutrin but doesn't remember if it worked or not.     Dark strong smelling urine-acute, not improving  -We will get urinalysis today, if within normal limits, advised  patient to increase water intake and and will evaluate what she is eating and drinking so we can figure out what is contributing to this if she wants to fix this problem at next appointment.    IBS with diarrhea-chronic, uncontrolled.  -Prescribed Bentyl as needed, advised on constipation, follow-up in a couple weeks to see if this is helping.    Allergic rhinitis/left otitis media/left otitis externa-acute, not improving.  -Doxycycline and ofloxacin drops prescribed.  Continue 1 antihistamine.  Follow-up if not improving.    Patient needs help with answering some questions on medical history for her SI paperwork.     Follow-up after lab work.    Patient educated to follow-up sooner than next scheduled appointment if condition(s) worse or do not improve. Patient states understanding and is in agreeance with plan of care. An After Visit Summary was printed and given to the patient.      ABHISHEK Bowman        This document has been electronically signed by ABHISHEK Bowman on March 5, 2019 7:51 AM      EMR/Transcription Dragon Disclaimer:  Some of this note may be an electronic dragon transcription/translation of spoken language to printed text. The electronic translation of spoken language may permit erroneous, or at times, nonsensical words or phrases to be inadvertently transcribed. Although I have reviewed the note for such errors, some may still exist.

## 2019-03-11 ENCOUNTER — LAB (OUTPATIENT)
Dept: LAB | Facility: OTHER | Age: 34
End: 2019-03-11

## 2019-03-11 DIAGNOSIS — H66.92 LEFT OTITIS MEDIA, UNSPECIFIED OTITIS MEDIA TYPE: ICD-10-CM

## 2019-03-11 DIAGNOSIS — E55.9 VITAMIN D DEFICIENCY: ICD-10-CM

## 2019-03-11 DIAGNOSIS — Z13.220 SCREENING FOR HYPERLIPIDEMIA: ICD-10-CM

## 2019-03-11 DIAGNOSIS — E53.8 LOW SERUM VITAMIN B12: ICD-10-CM

## 2019-03-11 DIAGNOSIS — Z13.29 SCREENING FOR THYROID DISORDER: ICD-10-CM

## 2019-03-11 DIAGNOSIS — Z13.0 SCREENING FOR DEFICIENCY ANEMIA: ICD-10-CM

## 2019-03-11 DIAGNOSIS — J01.91 ACUTE RECURRENT SINUSITIS, UNSPECIFIED LOCATION: Primary | ICD-10-CM

## 2019-03-11 DIAGNOSIS — R73.9 HYPERGLYCEMIA: ICD-10-CM

## 2019-03-11 DIAGNOSIS — Z13.1 SCREENING FOR DIABETES MELLITUS: ICD-10-CM

## 2019-03-11 LAB
ALBUMIN SERPL-MCNC: 4.4 G/DL (ref 3.5–5)
ALBUMIN/GLOB SERPL: 1.5 G/DL (ref 1.1–1.8)
ALP SERPL-CCNC: 72 U/L (ref 38–126)
ALT SERPL W P-5'-P-CCNC: 46 U/L
ANION GAP SERPL CALCULATED.3IONS-SCNC: 7 MMOL/L (ref 5–15)
AST SERPL-CCNC: 34 U/L (ref 14–36)
BASOPHILS # BLD AUTO: 0.04 10*3/MM3 (ref 0–0.2)
BASOPHILS NFR BLD AUTO: 0.5 % (ref 0–2)
BILIRUB SERPL-MCNC: 0.4 MG/DL (ref 0.2–1.3)
BUN BLD-MCNC: 9 MG/DL (ref 7–17)
BUN/CREAT SERPL: 10.1 (ref 7–25)
CALCIUM SPEC-SCNC: 9.4 MG/DL (ref 8.4–10.2)
CHLORIDE SERPL-SCNC: 106 MMOL/L (ref 98–107)
CHOLEST SERPL-MCNC: 159 MG/DL (ref 150–200)
CO2 SERPL-SCNC: 29 MMOL/L (ref 22–30)
CREAT BLD-MCNC: 0.89 MG/DL (ref 0.52–1.04)
DEPRECATED RDW RBC AUTO: 41.9 FL (ref 36.4–46.3)
EOSINOPHIL # BLD AUTO: 0.54 10*3/MM3 (ref 0–0.7)
EOSINOPHIL NFR BLD AUTO: 6.5 % (ref 0–7)
ERYTHROCYTE [DISTWIDTH] IN BLOOD BY AUTOMATED COUNT: 14.2 % (ref 11.5–14.5)
GFR SERPL CREATININE-BSD FRML MDRD: 74 ML/MIN/1.73 (ref 64–149)
GLOBULIN UR ELPH-MCNC: 3 GM/DL (ref 2.3–3.5)
GLUCOSE BLD-MCNC: 98 MG/DL (ref 74–99)
HCT VFR BLD AUTO: 42.5 % (ref 35–45)
HDLC SERPL-MCNC: 44 MG/DL (ref 40–59)
HGB BLD-MCNC: 13.4 G/DL (ref 12–15.5)
LDLC SERPL CALC-MCNC: 76 MG/DL
LDLC/HDLC SERPL: 1.73 {RATIO} (ref 0–3.22)
LYMPHOCYTES # BLD AUTO: 2.78 10*3/MM3 (ref 0.6–4.2)
LYMPHOCYTES NFR BLD AUTO: 33.4 % (ref 10–50)
MCH RBC QN AUTO: 26.2 PG (ref 26.5–34)
MCHC RBC AUTO-ENTMCNC: 31.5 G/DL (ref 31.4–36)
MCV RBC AUTO: 83 FL (ref 80–98)
MONOCYTES # BLD AUTO: 0.55 10*3/MM3 (ref 0–0.9)
MONOCYTES NFR BLD AUTO: 6.6 % (ref 0–12)
NEUTROPHILS # BLD AUTO: 4.42 10*3/MM3 (ref 2–8.6)
NEUTROPHILS NFR BLD AUTO: 53 % (ref 37–80)
PLATELET # BLD AUTO: 283 10*3/MM3 (ref 150–450)
PMV BLD AUTO: 9.5 FL (ref 8–12)
POTASSIUM BLD-SCNC: 4 MMOL/L (ref 3.4–5)
PROT SERPL-MCNC: 7.4 G/DL (ref 6.3–8.2)
RBC # BLD AUTO: 5.12 10*6/MM3 (ref 3.77–5.16)
SODIUM BLD-SCNC: 142 MMOL/L (ref 137–145)
TRIGL SERPL-MCNC: 195 MG/DL
VLDLC SERPL-MCNC: 39 MG/DL
WBC NRBC COR # BLD: 8.33 10*3/MM3 (ref 3.2–9.8)

## 2019-03-11 PROCEDURE — 83036 HEMOGLOBIN GLYCOSYLATED A1C: CPT | Performed by: NURSE PRACTITIONER

## 2019-03-11 PROCEDURE — 82607 VITAMIN B-12: CPT | Performed by: NURSE PRACTITIONER

## 2019-03-11 PROCEDURE — 80050 GENERAL HEALTH PANEL: CPT | Performed by: NURSE PRACTITIONER

## 2019-03-11 PROCEDURE — 80061 LIPID PANEL: CPT | Performed by: NURSE PRACTITIONER

## 2019-03-11 PROCEDURE — 84439 ASSAY OF FREE THYROXINE: CPT | Performed by: NURSE PRACTITIONER

## 2019-03-11 PROCEDURE — 82306 VITAMIN D 25 HYDROXY: CPT | Performed by: NURSE PRACTITIONER

## 2019-03-11 PROCEDURE — 36415 COLL VENOUS BLD VENIPUNCTURE: CPT | Performed by: NURSE PRACTITIONER

## 2019-03-11 RX ORDER — LEVOFLOXACIN 500 MG/1
500 TABLET, FILM COATED ORAL DAILY
Qty: 7 TABLET | Refills: 0 | Status: SHIPPED | OUTPATIENT
Start: 2019-03-11 | End: 2019-07-08

## 2019-03-12 LAB
25(OH)D3 SERPL-MCNC: 21.2 NG/ML (ref 30–100)
HBA1C MFR BLD: 5.4 % (ref 4–5.6)
T4 FREE SERPL-MCNC: 1.05 NG/DL (ref 0.78–2.19)
TSH SERPL DL<=0.05 MIU/L-ACNC: 2.8 MIU/ML (ref 0.46–4.68)
VIT B12 BLD-MCNC: 447 PG/ML (ref 239–931)

## 2019-03-15 DIAGNOSIS — J30.1 CHRONIC ALLERGIC RHINITIS DUE TO POLLEN: ICD-10-CM

## 2019-03-15 RX ORDER — FLUTICASONE PROPIONATE 50 MCG
SPRAY, SUSPENSION (ML) NASAL
Qty: 16 G | Refills: 5 | Status: SHIPPED | OUTPATIENT
Start: 2019-03-15 | End: 2019-09-16 | Stop reason: SDUPTHER

## 2019-03-15 RX ORDER — LORATADINE 10 MG/1
10 TABLET ORAL DAILY
Qty: 30 TABLET | Refills: 5 | Status: SHIPPED | OUTPATIENT
Start: 2019-03-15 | End: 2019-09-16 | Stop reason: SDUPTHER

## 2019-03-18 ENCOUNTER — OFFICE VISIT (OUTPATIENT)
Dept: FAMILY MEDICINE CLINIC | Facility: CLINIC | Age: 34
End: 2019-03-18

## 2019-03-18 VITALS
SYSTOLIC BLOOD PRESSURE: 118 MMHG | TEMPERATURE: 99.4 F | HEIGHT: 63 IN | WEIGHT: 217 LBS | HEART RATE: 86 BPM | RESPIRATION RATE: 18 BRPM | DIASTOLIC BLOOD PRESSURE: 90 MMHG | OXYGEN SATURATION: 99 % | BODY MASS INDEX: 38.45 KG/M2

## 2019-03-18 DIAGNOSIS — F32.A DEPRESSION, UNSPECIFIED DEPRESSION TYPE: ICD-10-CM

## 2019-03-18 DIAGNOSIS — J40 BRONCHITIS: Primary | ICD-10-CM

## 2019-03-18 DIAGNOSIS — E87.6 HYPOKALEMIA: ICD-10-CM

## 2019-03-18 DIAGNOSIS — E55.9 VITAMIN D DEFICIENCY: ICD-10-CM

## 2019-03-18 DIAGNOSIS — K58.0 IRRITABLE BOWEL SYNDROME WITH DIARRHEA: Chronic | ICD-10-CM

## 2019-03-18 DIAGNOSIS — E78.5 HYPERLIPIDEMIA, UNSPECIFIED HYPERLIPIDEMIA TYPE: ICD-10-CM

## 2019-03-18 DIAGNOSIS — R60.9 PERIPHERAL EDEMA: ICD-10-CM

## 2019-03-18 DIAGNOSIS — E16.2 HYPOGLYCEMIA: ICD-10-CM

## 2019-03-18 DIAGNOSIS — G47.00 INSOMNIA, UNSPECIFIED TYPE: ICD-10-CM

## 2019-03-18 DIAGNOSIS — F41.9 ANXIETY: Chronic | ICD-10-CM

## 2019-03-18 DIAGNOSIS — J01.00 ACUTE MAXILLARY SINUSITIS, RECURRENCE NOT SPECIFIED: ICD-10-CM

## 2019-03-18 PROCEDURE — 99214 OFFICE O/P EST MOD 30 MIN: CPT | Performed by: NURSE PRACTITIONER

## 2019-03-18 PROCEDURE — 96372 THER/PROPH/DIAG INJ SC/IM: CPT | Performed by: NURSE PRACTITIONER

## 2019-03-18 RX ORDER — BROMPHENIRAMINE MALEATE, PSEUDOEPHEDRINE HYDROCHLORIDE, AND DEXTROMETHORPHAN HYDROBROMIDE 2; 30; 10 MG/5ML; MG/5ML; MG/5ML
2.5 SYRUP ORAL 4 TIMES DAILY PRN
Qty: 473 ML | Refills: 0 | Status: SHIPPED | OUTPATIENT
Start: 2019-03-18 | End: 2019-03-21 | Stop reason: SDUPTHER

## 2019-03-18 RX ORDER — CHLORAL HYDRATE 500 MG
1000 CAPSULE ORAL
Qty: 90 CAPSULE | Refills: 5 | Status: SHIPPED | OUTPATIENT
Start: 2019-03-18 | End: 2019-07-08

## 2019-03-18 RX ORDER — DOXYCYCLINE 100 MG/1
100 CAPSULE ORAL 2 TIMES DAILY
Refills: 0 | COMMUNITY
Start: 2019-02-26 | End: 2019-03-18

## 2019-03-18 RX ORDER — METHYLPREDNISOLONE ACETATE 80 MG/ML
80 INJECTION, SUSPENSION INTRA-ARTICULAR; INTRALESIONAL; INTRAMUSCULAR; SOFT TISSUE ONCE
Status: COMPLETED | OUTPATIENT
Start: 2019-03-18 | End: 2019-03-18

## 2019-03-18 RX ORDER — POTASSIUM CHLORIDE 750 MG/1
10 TABLET, FILM COATED, EXTENDED RELEASE ORAL DAILY
Qty: 30 TABLET | Refills: 5 | Status: SHIPPED | OUTPATIENT
Start: 2019-03-18 | End: 2019-07-18 | Stop reason: SDUPTHER

## 2019-03-18 RX ORDER — GUAIFENESIN 600 MG/1
1200 TABLET, EXTENDED RELEASE ORAL 2 TIMES DAILY
Qty: 40 TABLET | Refills: 0 | Status: SHIPPED | OUTPATIENT
Start: 2019-03-18 | End: 2019-07-08

## 2019-03-18 RX ORDER — GABAPENTIN 600 MG/1
TABLET ORAL
COMMUNITY
Start: 2019-02-28 | End: 2019-03-18

## 2019-03-18 RX ORDER — AMITRIPTYLINE HYDROCHLORIDE 10 MG/1
10 TABLET, FILM COATED ORAL NIGHTLY
Qty: 30 TABLET | Refills: 5 | Status: SHIPPED | OUTPATIENT
Start: 2019-03-18 | End: 2019-07-05 | Stop reason: SDUPTHER

## 2019-03-18 RX ORDER — HYDROCHLOROTHIAZIDE 12.5 MG/1
12.5 TABLET ORAL 2 TIMES DAILY
Qty: 60 TABLET | Refills: 5 | Status: SHIPPED | OUTPATIENT
Start: 2019-03-18 | End: 2019-07-14 | Stop reason: SDUPTHER

## 2019-03-18 RX ORDER — ERGOCALCIFEROL 1.25 MG/1
50000 CAPSULE ORAL WEEKLY
Qty: 5 CAPSULE | Refills: 6 | Status: SHIPPED | OUTPATIENT
Start: 2019-03-18 | End: 2019-09-09 | Stop reason: SDUPTHER

## 2019-03-18 RX ORDER — CITALOPRAM 10 MG/1
10 TABLET ORAL DAILY
Qty: 30 TABLET | Refills: 5 | Status: SHIPPED | OUTPATIENT
Start: 2019-03-18 | End: 2019-07-05 | Stop reason: SDUPTHER

## 2019-03-18 RX ORDER — DICYCLOMINE HYDROCHLORIDE 10 MG/1
10 CAPSULE ORAL 3 TIMES DAILY PRN
Qty: 90 CAPSULE | Refills: 2 | Status: SHIPPED | OUTPATIENT
Start: 2019-03-18 | End: 2019-09-16 | Stop reason: SDUPTHER

## 2019-03-18 RX ADMIN — METHYLPREDNISOLONE ACETATE 80 MG: 80 INJECTION, SUSPENSION INTRA-ARTICULAR; INTRALESIONAL; INTRAMUSCULAR; SOFT TISSUE at 16:30

## 2019-03-21 RX ORDER — BROMPHENIRAMINE MALEATE, PSEUDOEPHEDRINE HYDROCHLORIDE, AND DEXTROMETHORPHAN HYDROBROMIDE 2; 30; 10 MG/5ML; MG/5ML; MG/5ML
2.5 SYRUP ORAL 4 TIMES DAILY PRN
Qty: 473 ML | Refills: 0 | Status: SHIPPED | OUTPATIENT
Start: 2019-03-21 | End: 2019-07-08

## 2019-03-22 NOTE — PROGRESS NOTES
"Subjective   Veronica Hicks is a 33 y.o. female who presents to the office to review labs.     History of Present Illness  Last labs done on 3/11/19  reviewed with the patient today.    Anxiety/insomnia/Depression-chronic,  controlled on amitriptyline and Celexa. (hx of being on several meds (failed amitriptyline too sleep, worried about seroquel so pt doesn't want to try it, gabapentin po gives pt hallucinations, failed due to ineffectiveness on cymbalta, buspar, risperdal, trazodone, and prozac. Hx of seeing pennyrile, pt states dx of bipolar affective. Effexor caused serious AE. Pt also states see has been on wellbutrin but doesn't remember if it worked or not.)    IBS-chronic, controlled with Bentyl as needed    Sinusitis/bronchitis-acute, not improving.  Patient has not picked up antibiotic to start on yet.  Patient states she is having a somewhat productive cough, shortness of breath, wheezing, headache, sinus pain and pressure maxillary and ear pain.  States she is taking Flonase and Claritin.       PMH:   Chronic AR-controlled with flonase and claritin  Chronic pain of left knee causing unstable gait for which she uses a cane.  Vitamin B12 deficiency-chronic, controlled with vitamin B12 injections.  Patient does the shots at home.  Vitamin D deficiency-chronic, controlled with vitamin D weekly.  Peripheral arterial disease-NOT dx anymore per Dr. Crawford (updated LEONID)  Thoracic/lumbar back pain-acute, improving with robaxin and diclofenac and compound. (flexeril doesn't work, gabapentin PO causes hallucinations.)  Hyperglycemia-with boats of hypoglycemia-chronic, mildly controlled with diet. 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    Review of Systems   Constitutional: " Positive for fever. Negative for activity change, appetite change, chills, diaphoresis, fatigue and unexpected weight change.   HENT: Positive for congestion, ear pain, sinus pressure and sinus pain. Negative for ear discharge, nosebleeds, postnasal drip, rhinorrhea, sneezing, tinnitus and trouble swallowing.    Eyes: Negative.    Respiratory: Positive for chest tightness, shortness of breath and wheezing. Negative for cough.    Cardiovascular: Negative for chest pain, palpitations and leg swelling.   Gastrointestinal: Negative for abdominal distention, blood in stool, constipation, diarrhea, nausea and vomiting.   Genitourinary: Positive for frequency. Negative for difficulty urinating, dyspareunia, dysuria, flank pain, hematuria, menstrual problem, vaginal bleeding, vaginal discharge and vaginal pain.        Bladder spasms   Musculoskeletal: Positive for arthralgias, back pain and gait problem. Negative for neck pain and neck stiffness.   Skin: Negative for wound.   Allergic/Immunologic: Negative for environmental allergies, food allergies and immunocompromised state.   Neurological: Positive for headaches. Negative for dizziness, tremors, seizures, syncope, weakness, light-headedness and numbness.   Hematological: Does not bruise/bleed easily.   Psychiatric/Behavioral: Positive for sleep disturbance (Improving). Negative for behavioral problems, self-injury and suicidal ideas. The patient is nervous/anxious (Improving).        Past Medical History:   Diagnosis Date   • Abdominal pain    • Abnormal Pap smear of cervix    • Acute bronchitis    • Acute left otitis media    • Ankle pain    • Anxiety    • Asthma    • Attention deficit hyperactivity disorder    • Bipolar disorder (CMS/HCC)    • Candidiasis    • Candidiasis of vagina    • Depression    • Diabetes mellitus (CMS/[facility])    • Diarrhea    • Dizziness    • Dysuria    • Elbow joint pain     elbow joint - painful on movement   • Elbow joint pain    • Elevated  "blood pressure    • Encounter for long-term (current) use of medications     Long-term (current) use of other medications    • Epigastric pain    • Excessive thirst    • Fall    • Fatigue    • Feeling tired     tired all the time   • Flank pain    • Gastroesophageal reflux disease    • High risk sexual behavior    • History of malignant neoplasm of cervix    • Hordeolum internum right upper eyelid    • HPV (human papilloma virus) infection    • Hx of blood clots    • Hypokalemia    • Increased frequency of urination    • Infection of skin and subcutaneous tissue    • Irritable bowel syndrome    • Knee pain, left    • Low back pain    • Muscle weakness    • Nausea and vomiting    • Pain in limb    • Pain in wrist    • Peripheral arterial disease (CMS/HCC)    • Postartificial menopausal syndrome    • Postmenopausal state    • Serous otitis media    • Skin lesion    • Smokes tobacco daily    • Upper respiratory infection    • Urinary tract infection    • Vaginitis        Family History   Problem Relation Age of Onset   • ADD / ADHD Daughter    • Lung cancer Maternal Grandmother    • Cancer Paternal Grandmother    • Uterine cancer Paternal Grandmother    • Colon cancer Paternal Grandmother    • Diabetes Other    • Liver cancer Other    • Breast cancer Mother    • Ovarian cancer Mother    • Birth defects Brother    • Stroke Paternal Grandfather    • Endometrial cancer Neg Hx           Objective   /90   Pulse 86   Temp 99.4 °F (37.4 °C) (Temporal)   Resp 18   Ht 160 cm (63\")   Wt 98.4 kg (217 lb)   SpO2 99%   Breastfeeding? No   BMI 38.44 kg/m²   Physical Exam   Constitutional: She is oriented to person, place, and time. She appears well-developed and well-nourished. She is cooperative. She does not appear ill.   HENT:   Head: Normocephalic.   Right Ear: Hearing, external ear and ear canal normal. No drainage. Tympanic membrane is bulging. Tympanic membrane is not injected, not erythematous and not " retracted. No decreased hearing is noted.   Left Ear: Hearing, external ear and ear canal normal. No drainage, swelling or tenderness. Tympanic membrane is bulging. Tympanic membrane is not injected, not erythematous and not retracted.  No middle ear effusion. No decreased hearing is noted.   Nose: Mucosal edema, rhinorrhea and sinus tenderness present. Right sinus exhibits maxillary sinus tenderness. Right sinus exhibits no frontal sinus tenderness. Left sinus exhibits maxillary sinus tenderness. Left sinus exhibits no frontal sinus tenderness.   Mouth/Throat: Uvula is midline and mucous membranes are normal. Oropharyngeal exudate present.   Eyes: Conjunctivae and lids are normal. Pupils are equal, round, and reactive to light. Right eye exhibits no discharge. Left eye exhibits no discharge. Right conjunctiva is not injected. Left conjunctiva is not injected. Right eye exhibits normal extraocular motion and no nystagmus. Left eye exhibits normal extraocular motion and no nystagmus.   Neck: Normal range of motion. Neck supple.   Cardiovascular: Normal rate, regular rhythm, normal heart sounds and intact distal pulses. Exam reveals no gallop and no friction rub.   No murmur heard.  Pulmonary/Chest: Effort normal. No respiratory distress. She has no decreased breath sounds. She has wheezes. She has no rales.   Abdominal: Soft. Normal appearance, normal aorta and bowel sounds are normal. She exhibits no shifting dullness, no distension, no pulsatile liver, no fluid wave, no abdominal bruit, no ascites, no pulsatile midline mass and no mass. There is no hepatosplenomegaly. There is generalized tenderness. There is no rebound, no guarding, no CVA tenderness, no tenderness at McBurney's point and negative Ramirez's sign. No hernia.   Musculoskeletal: She exhibits no edema or deformity.        Cervical back: She exhibits decreased range of motion (due to stiffness), tenderness, bony tenderness, pain and spasm. She exhibits  no swelling, no edema, no deformity, no laceration and normal pulse.        Thoracic back: She exhibits decreased range of motion, tenderness and pain. She exhibits no swelling (no swelling noted).        Lumbar back: She exhibits decreased range of motion, tenderness and pain. She exhibits no swelling (no swelling ntoed).   Lymphadenopathy:     She has no cervical adenopathy.   Neurological: She is alert and oriented to person, place, and time. She has normal strength and normal reflexes. She displays normal reflexes. No sensory deficit.   Reflex Scores:       Patellar reflexes are 2+ on the right side and 2+ on the left side.  Wobbling gait at times due to left knee pain, pt ambulatory with walker.    Skin: Skin is warm, dry and intact. No rash noted. She is not diaphoretic. No erythema. No pallor.   Psychiatric: Her behavior is normal. Thought content normal. Her mood appears anxious. Her speech is rapid and/or pressured. Cognition and memory are normal.   Patient is dressed appropriately for weather and situation, makes eye contact, and engages in conversation. Pt seems very anxious and cries during visit.  She is attentive.   Nursing note and vitals reviewed.       PHQ-2/PHQ-9 Depression Screening 3/18/2019   Little interest or pleasure in doing things 0   Feeling down, depressed, or hopeless 0   Trouble falling or staying asleep, or sleeping too much -   Feeling tired or having little energy -   Poor appetite or overeating -   Feeling bad about yourself - or that you are a failure or have let yourself or your family down -   Trouble concentrating on things, such as reading the newspaper or watching television -   Moving or speaking so slowly that other people could have noticed. Or the opposite - being so fidgety or restless that you have been moving around a lot more than usual -   Thoughts that you would be better off dead, or of hurting yourself in some way -   Total Score 0   If you checked off any  problems, how difficult have these problems made it for you to do your work, take care of things at home, or get along with other people? -         Assessment/Plan   Veronica was seen today for follow-up.    Diagnoses and all orders for this visit:    Bronchitis  -     methylPREDNISolone acetate (DEPO-medrol) injection 80 mg    Hypoglycemia  -     glucose blood test strip; 1 each by Other route 3 (Three) Times a Day As Needed (hypoglycemia). Use as instructed  -     ONE TOUCH LANCETS misc; 1 each 3 (Three) Times a Day As Needed (hypoglycemia).    Vitamin D deficiency  -     vitamin D (ERGOCALCIFEROL) 23940 units capsule capsule; Take 1 capsule by mouth 1 (One) Time Per Week.    Hypokalemia  -     potassium chloride (K-DUR) 10 MEQ CR tablet; Take 1 tablet by mouth Daily.    Hyperlipidemia, unspecified hyperlipidemia type  -     Omega-3 Fatty Acids (FISH OIL) 1000 MG capsule capsule; Take 1 capsule by mouth 3 (Three) Times a Day With Meals.    Peripheral edema  -     hydrochlorothiazide (HYDRODIURIL) 12.5 MG tablet; Take 1 tablet by mouth 2 (Two) Times a Day.    Irritable bowel syndrome with diarrhea    Acute maxillary sinusitis, recurrence not specified    Depression, unspecified depression type    Insomnia, unspecified type    Anxiety    Other orders  -     dicyclomine (BENTYL) 10 MG capsule; Take 1 capsule by mouth 3 (Three) Times a Day As Needed (diarrhea/stomach cramps).  -     amitriptyline (ELAVIL) 10 MG tablet; Take 1 tablet by mouth Every Night.  -     citalopram (CeleXA) 10 MG tablet; Take 1 tablet by mouth Daily.  -     guaiFENesin (MUCINEX) 600 MG 12 hr tablet; Take 2 tablets by mouth 2 (Two) Times a Day.  -     Discontinue: brompheniramine-pseudoephedrine-DM 30-2-10 MG/5ML syrup; Take 2.5 mL by mouth 4 (Four) Times a Day As Needed for Congestion, Cough or Allergies.             Anxiety/insomnia/Depression-chronic,  controlled on amitriptyline and Celexa. (hx of being on several meds (failed amitriptyline  "too sleep, worried about seroquel so pt doesn't want to try it, gabapentin po gives pt hallucinations, failed due to ineffectiveness on cymbalta, buspar, risperdal, trazodone, and prozac. Hx of seeing pennyrile, pt states dx of bipolar affective. Effexor caused serious AE. Pt also states see has been on wellbutrin but doesn't remember if it worked or not.)    IBS-chronic, controlled with Bentyl as needed    Sinusitis/bronchitis-acute, not improving.  Patient will  Levaquin and start on it, prescribed Mucinex and cough syrup as needed for symptoms.  Rest and drink plenty of fluids.  Depo-Medrol shot given in office.    Refilled medications due on chronic diagnosis:  Hyperlipidemia-chronic, controlled with fish oil 3 times daily.  Peripheral edema-chronic with intermittent exacerbations, improving with Lasix and potassium when needed.  Chronic AR-controlled with flonase and claritin  Chronic pain of left knee causing unstable gait for which she uses a cane.  Vitamin B12 deficiency-chronic, controlled with vitamin B12 injections.  Patient does the shots at home.  Vitamin D deficiency-chronic, controlled with vitamin D weekly.  Peripheral arterial disease-NOT dx anymore per Dr. Crawford (updated LEONID)  Thoracic/lumbar back pain-acute, improving with robaxin and diclofenac and compound. (flexeril doesn't work, gabapentin PO causes hallucinations.)  Hyperglycemia-with boats of hypoglycemia-chronic, mildly controlled with diet. 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 Patient states understanding and is in agreeance with plan of care. An After Visit Summary was printed and given to the patient.      ABHISHEK Bowman        This document has been electronically signed by ABHISHEK Bowman on March 22, 2019 3:19 PM      EMR/Transcription Dragon Disclaimer:  Some of this note may be an " electronic dragon transcription/translation of spoken language to printed text. The electronic translation of spoken language may permit erroneous, or at times, nonsensical words or phrases to be inadvertently transcribed. Although I have reviewed the note for such errors, some may still exist.

## 2019-05-09 RX ORDER — ONDANSETRON 4 MG/1
4 TABLET, ORALLY DISINTEGRATING ORAL EVERY 8 HOURS PRN
Qty: 30 TABLET | Refills: 0 | Status: SHIPPED | OUTPATIENT
Start: 2019-05-09 | End: 2019-09-16 | Stop reason: SDUPTHER

## 2019-06-20 DIAGNOSIS — R20.2 PARESTHESIA OF BOTH HANDS: ICD-10-CM

## 2019-06-20 DIAGNOSIS — R20.2 PARESTHESIA OF BOTH FEET: Primary | ICD-10-CM

## 2019-06-21 ENCOUNTER — TELEPHONE (OUTPATIENT)
Dept: FAMILY MEDICINE CLINIC | Facility: CLINIC | Age: 34
End: 2019-06-21

## 2019-06-24 DIAGNOSIS — R20.2 PARESTHESIA OF BOTH HANDS: ICD-10-CM

## 2019-06-24 DIAGNOSIS — R20.2 PARESTHESIA OF BOTH FEET: Primary | ICD-10-CM

## 2019-07-03 ENCOUNTER — PATIENT MESSAGE (OUTPATIENT)
Dept: FAMILY MEDICINE CLINIC | Facility: CLINIC | Age: 34
End: 2019-07-03

## 2019-07-05 RX ORDER — CITALOPRAM 10 MG/1
10 TABLET ORAL DAILY
Qty: 30 TABLET | Refills: 2 | Status: SHIPPED | OUTPATIENT
Start: 2019-07-05 | End: 2019-09-10 | Stop reason: SDUPTHER

## 2019-07-05 RX ORDER — FLUCONAZOLE 150 MG/1
TABLET ORAL
Qty: 2 TABLET | Refills: 0 | Status: SHIPPED | OUTPATIENT
Start: 2019-07-05 | End: 2019-07-07 | Stop reason: SDUPTHER

## 2019-07-05 RX ORDER — AMITRIPTYLINE HYDROCHLORIDE 10 MG/1
10 TABLET, FILM COATED ORAL NIGHTLY
Qty: 30 TABLET | Refills: 2 | Status: SHIPPED | OUTPATIENT
Start: 2019-07-05 | End: 2019-10-10

## 2019-07-05 NOTE — TELEPHONE ENCOUNTER
From: Veronica Hicks  To: Gina Whittaker APRN  Sent: 7/3/2019 11:03 AM CDT  Subject: Non-Urgent Medical Question    Just two quick questions as I know you are out of office till Friday and [facility] pools still hasnt refilled my celexa, amnatriptalene, or the one for my bladder spasms which was helping. 2. Can you send me in just the yeast infection antibiotic? I have one starting again. Itching and milky discharge. I do have an javi with the womens doc for my annual but it's not till next Monday and I'd rather get it under control now.

## 2019-07-07 RX ORDER — FLUCONAZOLE 150 MG/1
TABLET ORAL
Qty: 2 TABLET | Refills: 0 | Status: SHIPPED | OUTPATIENT
Start: 2019-07-07 | End: 2019-09-16

## 2019-07-08 ENCOUNTER — OFFICE VISIT (OUTPATIENT)
Dept: OBSTETRICS AND GYNECOLOGY | Facility: CLINIC | Age: 34
End: 2019-07-08

## 2019-07-08 VITALS
HEART RATE: 98 BPM | WEIGHT: 209.4 LBS | HEIGHT: 63 IN | BODY MASS INDEX: 37.1 KG/M2 | DIASTOLIC BLOOD PRESSURE: 80 MMHG | SYSTOLIC BLOOD PRESSURE: 110 MMHG

## 2019-07-08 DIAGNOSIS — N76.0 ACUTE VAGINITIS: Primary | ICD-10-CM

## 2019-07-08 PROCEDURE — 99214 OFFICE O/P EST MOD 30 MIN: CPT | Performed by: NURSE PRACTITIONER

## 2019-07-08 PROCEDURE — 87210 SMEAR WET MOUNT SALINE/INK: CPT | Performed by: NURSE PRACTITIONER

## 2019-07-08 RX ORDER — CILOSTAZOL 100 MG/1
100 TABLET ORAL DAILY
Refills: 6 | COMMUNITY
Start: 2019-06-21 | End: 2019-09-08 | Stop reason: SDUPTHER

## 2019-07-08 RX ORDER — METRONIDAZOLE 500 MG/1
500 TABLET ORAL 2 TIMES DAILY
Qty: 14 TABLET | Refills: 0 | Status: SHIPPED | OUTPATIENT
Start: 2019-07-08 | End: 2019-07-15

## 2019-07-09 NOTE — PROGRESS NOTES
Subjective   Veronica Hicks is a 33 y.o. female.     History of Present Illness   Pt presents with complaints of vaginal swelling, burning, itching, discharge and odor x 1.5 weeks. Pt wants to perform pap test today as well but notes she has used monistat this morning and agrees to postpone the well woman visit and address her concerns.     Pt called PCP last week with complaints. PCP called in 2 Diflucan for her but pt never picked up from the pharmacy. She has been using OTC monistat with soothing relief but continued underlying symptoms.     S/P Hysterectomy w/o CHELA in 2008 and oophorectomy in 2015. History of Trichomoniasis.     The following portions of the patient's history were reviewed and updated as appropriate: allergies, current medications, past family history, past medical history, past social history, past surgical history and problem list.    Review of Systems   Constitutional: Negative.  Negative for chills and fever.   Respiratory: Negative.    Cardiovascular: Negative.    Genitourinary: Positive for vaginal discharge (with odor) and vaginal pain (burning, swelling, itching). Negative for dysuria, frequency, genital sores, pelvic pain, urgency and vaginal bleeding.   Skin: Negative for rash.   Psychiatric/Behavioral: Positive for positive for hyperactivity. The patient is nervous/anxious.        Objective    Vitals:    07/08/19 1613   BP: 110/80   Pulse: 98         07/08/19  1613   Weight: 95 kg (209 lb 6.4 oz)     Body mass index is 37.09 kg/m².    Physical Exam   Constitutional: She is oriented to person, place, and time. She appears well-developed and well-nourished.   Cardiovascular: Normal rate, regular rhythm and normal heart sounds.   Pulmonary/Chest: Effort normal and breath sounds normal.   Genitourinary:       There is no rash, tenderness, lesion, injury or Bartholin's cyst on the right labia. There is no rash, tenderness, lesion, injury or Bartholin's cyst on the left labia. Uterus is  absent.   Cervix is absent. Right adnexum is absent.Left adnexum is absent.Vagina exhibits no lesion. There is erythema in the vagina. No tenderness or bleeding in the vagina. No foreign body in the vagina. No signs of injury around the vagina. Vaginal discharge (copious thin, white and pale yellow discharge, malodorous, wet prep obtained. Pt declines STI testing. ) found.   Genitourinary Comments: Wet prep: positive for clue cells, excessive bacteria TNTC, some WBC, no yeast buds, hyphae or trichomonads. Evaluated by DC Knutson.    Neurological: She is alert and oriented to person, place, and time.   Psychiatric: She is hyperactive.   Vitals reviewed.        Assessment/Plan   Veronica was seen today for vaginitis.    Diagnoses and all orders for this visit:    Acute vaginitis  -     metroNIDAZOLE (FLAGYL) 500 MG tablet; Take 1 tablet by mouth 2 (Two) Times a Day for 7 days. No alcohol up to 48 hours after last dose      Discussed findings on exam with pt. She has been using a men's cedar soap and vaginal pH wash. Encouraged pt to stop using and switch to dove bar soap. She states she cannot use any form of bar soap due to causing hives. She states that she can tolerate Dove unscented liquid. While I don't recommend liquid, this is likely better than what she is currently using. Encouraged other vulvar hygiene habits to prevent recurrence. Pt states she was told she will have chronic flare ups of BV by the health department. We discussed ways to prevent it and to RTC if symptoms persist or recur. Treat with Flagyl 500mg BID x 7 days. No alcohol was stressed to the pt. Pt has 2 diflucan at the pharmacy already sent by PCP. Pt can take these prophylactic to prevent secondary candida or wait and take PRN.     RTC in 3 weeks for complete well woman exam.

## 2019-07-14 DIAGNOSIS — E87.6 HYPOKALEMIA: ICD-10-CM

## 2019-07-14 DIAGNOSIS — R60.9 PERIPHERAL EDEMA: ICD-10-CM

## 2019-07-15 RX ORDER — POTASSIUM CHLORIDE 750 MG/1
TABLET, FILM COATED, EXTENDED RELEASE ORAL
Qty: 30 TABLET | Refills: 2 | OUTPATIENT
Start: 2019-07-15

## 2019-07-15 RX ORDER — HYDROCHLOROTHIAZIDE 12.5 MG/1
TABLET ORAL
Qty: 60 TABLET | Refills: 5 | Status: SHIPPED | OUTPATIENT
Start: 2019-07-15 | End: 2019-10-10

## 2019-07-18 DIAGNOSIS — E87.6 HYPOKALEMIA: ICD-10-CM

## 2019-07-18 RX ORDER — POTASSIUM CHLORIDE 750 MG/1
10 TABLET, FILM COATED, EXTENDED RELEASE ORAL DAILY
Qty: 30 TABLET | Refills: 2 | Status: SHIPPED | OUTPATIENT
Start: 2019-07-18 | End: 2019-09-16

## 2019-08-16 DIAGNOSIS — E87.6 HYPOKALEMIA: ICD-10-CM

## 2019-08-16 DIAGNOSIS — N76.0 ACUTE VAGINITIS: ICD-10-CM

## 2019-08-16 RX ORDER — POTASSIUM CHLORIDE 750 MG/1
TABLET, FILM COATED, EXTENDED RELEASE ORAL
Qty: 30 TABLET | Refills: 2 | Status: SHIPPED | OUTPATIENT
Start: 2019-08-16 | End: 2019-12-12 | Stop reason: SDUPTHER

## 2019-08-16 RX ORDER — METRONIDAZOLE 500 MG/1
TABLET ORAL
Qty: 14 TABLET | Refills: 0 | OUTPATIENT
Start: 2019-08-16

## 2019-09-08 DIAGNOSIS — M54.50 CHRONIC BILATERAL LOW BACK PAIN WITHOUT SCIATICA: ICD-10-CM

## 2019-09-08 DIAGNOSIS — G89.29 CHRONIC BILATERAL THORACIC BACK PAIN: ICD-10-CM

## 2019-09-08 DIAGNOSIS — G89.29 CHRONIC BILATERAL LOW BACK PAIN WITHOUT SCIATICA: ICD-10-CM

## 2019-09-08 DIAGNOSIS — M54.6 CHRONIC BILATERAL THORACIC BACK PAIN: ICD-10-CM

## 2019-09-09 DIAGNOSIS — Z13.29 SCREENING FOR THYROID DISORDER: ICD-10-CM

## 2019-09-09 DIAGNOSIS — E78.5 HYPERLIPIDEMIA, UNSPECIFIED HYPERLIPIDEMIA TYPE: ICD-10-CM

## 2019-09-09 DIAGNOSIS — E16.2 HYPOGLYCEMIA: ICD-10-CM

## 2019-09-09 DIAGNOSIS — E55.9 VITAMIN D DEFICIENCY: ICD-10-CM

## 2019-09-09 DIAGNOSIS — E53.8 VITAMIN B 12 DEFICIENCY: ICD-10-CM

## 2019-09-09 DIAGNOSIS — R60.9 PERIPHERAL EDEMA: Primary | ICD-10-CM

## 2019-09-09 DIAGNOSIS — Z13.1 SCREENING FOR DIABETES MELLITUS: ICD-10-CM

## 2019-09-09 DIAGNOSIS — Z13.0 SCREENING FOR DEFICIENCY ANEMIA: ICD-10-CM

## 2019-09-09 RX ORDER — METHOCARBAMOL 500 MG/1
TABLET, FILM COATED ORAL
Qty: 60 TABLET | Refills: 0 | Status: SHIPPED | OUTPATIENT
Start: 2019-09-09 | End: 2019-09-16

## 2019-09-09 RX ORDER — CYANOCOBALAMIN 1000 UG/ML
1000 INJECTION, SOLUTION INTRAMUSCULAR; SUBCUTANEOUS
Qty: 1 ML | Refills: 1 | Status: SHIPPED | OUTPATIENT
Start: 2019-09-09 | End: 2019-09-16 | Stop reason: SDUPTHER

## 2019-09-09 RX ORDER — CILOSTAZOL 100 MG/1
TABLET ORAL
Qty: 30 TABLET | Refills: 0 | Status: SHIPPED | OUTPATIENT
Start: 2019-09-09 | End: 2019-09-16 | Stop reason: SDUPTHER

## 2019-09-09 RX ORDER — ERGOCALCIFEROL 1.25 MG/1
CAPSULE ORAL
Qty: 5 CAPSULE | Refills: 1 | Status: SHIPPED | OUTPATIENT
Start: 2019-09-09 | End: 2019-09-16

## 2019-09-10 RX ORDER — CITALOPRAM 10 MG/1
10 TABLET ORAL DAILY
Qty: 30 TABLET | Refills: 2 | OUTPATIENT
Start: 2019-09-10 | End: 2019-09-16 | Stop reason: SDUPTHER

## 2019-09-12 ENCOUNTER — LAB (OUTPATIENT)
Dept: LAB | Facility: OTHER | Age: 34
End: 2019-09-12

## 2019-09-12 DIAGNOSIS — R60.9 PERIPHERAL EDEMA: ICD-10-CM

## 2019-09-12 DIAGNOSIS — Z13.29 SCREENING FOR THYROID DISORDER: ICD-10-CM

## 2019-09-12 DIAGNOSIS — E53.8 VITAMIN B 12 DEFICIENCY: ICD-10-CM

## 2019-09-12 DIAGNOSIS — E16.2 HYPOGLYCEMIA: ICD-10-CM

## 2019-09-12 DIAGNOSIS — E78.5 HYPERLIPIDEMIA, UNSPECIFIED HYPERLIPIDEMIA TYPE: ICD-10-CM

## 2019-09-12 DIAGNOSIS — E55.9 VITAMIN D DEFICIENCY: ICD-10-CM

## 2019-09-12 DIAGNOSIS — Z13.0 SCREENING FOR DEFICIENCY ANEMIA: ICD-10-CM

## 2019-09-12 DIAGNOSIS — Z13.1 SCREENING FOR DIABETES MELLITUS: ICD-10-CM

## 2019-09-12 LAB
ALBUMIN SERPL-MCNC: 4.3 G/DL (ref 3.5–5)
ALBUMIN/GLOB SERPL: 1.4 G/DL (ref 1.1–1.8)
ALP SERPL-CCNC: 74 U/L (ref 38–126)
ALT SERPL W P-5'-P-CCNC: 49 U/L
ANION GAP SERPL CALCULATED.3IONS-SCNC: 9 MMOL/L (ref 5–15)
AST SERPL-CCNC: 34 U/L (ref 14–36)
BASOPHILS # BLD AUTO: 0.03 10*3/MM3 (ref 0–0.2)
BASOPHILS NFR BLD AUTO: 0.3 % (ref 0–1.5)
BILIRUB SERPL-MCNC: 0.5 MG/DL (ref 0.2–1.3)
BUN BLD-MCNC: 15 MG/DL (ref 7–23)
BUN/CREAT SERPL: 17.2 (ref 7–25)
CALCIUM SPEC-SCNC: 9.6 MG/DL (ref 8.4–10.2)
CHLORIDE SERPL-SCNC: 106 MMOL/L (ref 101–112)
CHOLEST SERPL-MCNC: 197 MG/DL (ref 150–200)
CO2 SERPL-SCNC: 28 MMOL/L (ref 22–30)
CREAT BLD-MCNC: 0.87 MG/DL (ref 0.52–1.04)
DEPRECATED RDW RBC AUTO: 42.1 FL (ref 37–54)
EOSINOPHIL # BLD AUTO: 0.41 10*3/MM3 (ref 0–0.4)
EOSINOPHIL NFR BLD AUTO: 4.6 % (ref 0.3–6.2)
ERYTHROCYTE [DISTWIDTH] IN BLOOD BY AUTOMATED COUNT: 14.1 % (ref 12.3–15.4)
GFR SERPL CREATININE-BSD FRML MDRD: 76 ML/MIN/1.73 (ref 64–149)
GLOBULIN UR ELPH-MCNC: 3 GM/DL (ref 2.3–3.5)
GLUCOSE BLD-MCNC: 93 MG/DL (ref 70–99)
HCT VFR BLD AUTO: 44.2 % (ref 34–46.6)
HDLC SERPL-MCNC: 44 MG/DL (ref 40–59)
HGB BLD-MCNC: 14.6 G/DL (ref 12–15.9)
LDLC SERPL CALC-MCNC: 126 MG/DL
LDLC/HDLC SERPL: 2.87 {RATIO} (ref 0–3.22)
LYMPHOCYTES # BLD AUTO: 2.92 10*3/MM3 (ref 0.7–3.1)
LYMPHOCYTES NFR BLD AUTO: 32.8 % (ref 19.6–45.3)
MCH RBC QN AUTO: 27.4 PG (ref 26.6–33)
MCHC RBC AUTO-ENTMCNC: 33 G/DL (ref 31.5–35.7)
MCV RBC AUTO: 82.9 FL (ref 79–97)
MONOCYTES # BLD AUTO: 0.68 10*3/MM3 (ref 0.1–0.9)
MONOCYTES NFR BLD AUTO: 7.6 % (ref 5–12)
NEUTROPHILS # BLD AUTO: 4.87 10*3/MM3 (ref 1.7–7)
NEUTROPHILS NFR BLD AUTO: 54.7 % (ref 42.7–76)
PLATELET # BLD AUTO: 288 10*3/MM3 (ref 140–450)
PMV BLD AUTO: 9 FL (ref 6–12)
POTASSIUM BLD-SCNC: 4 MMOL/L (ref 3.4–5)
PROT SERPL-MCNC: 7.3 G/DL (ref 6.3–8.6)
RBC # BLD AUTO: 5.33 10*6/MM3 (ref 3.77–5.28)
SODIUM BLD-SCNC: 143 MMOL/L (ref 137–145)
TRIGL SERPL-MCNC: 133 MG/DL
VLDLC SERPL-MCNC: 26.6 MG/DL
WBC NRBC COR # BLD: 8.91 10*3/MM3 (ref 3.4–10.8)

## 2019-09-12 PROCEDURE — 82306 VITAMIN D 25 HYDROXY: CPT | Performed by: NURSE PRACTITIONER

## 2019-09-12 PROCEDURE — 80050 GENERAL HEALTH PANEL: CPT | Performed by: NURSE PRACTITIONER

## 2019-09-12 PROCEDURE — 83036 HEMOGLOBIN GLYCOSYLATED A1C: CPT | Performed by: NURSE PRACTITIONER

## 2019-09-12 PROCEDURE — 84439 ASSAY OF FREE THYROXINE: CPT | Performed by: NURSE PRACTITIONER

## 2019-09-12 PROCEDURE — 82607 VITAMIN B-12: CPT | Performed by: NURSE PRACTITIONER

## 2019-09-12 PROCEDURE — 80061 LIPID PANEL: CPT | Performed by: NURSE PRACTITIONER

## 2019-09-13 LAB
25(OH)D3 SERPL-MCNC: 15.5 NG/ML (ref 30–100)
HBA1C MFR BLD: 5.3 % (ref 4.8–5.6)
T4 FREE SERPL-MCNC: 1.29 NG/DL (ref 0.93–1.7)
TSH SERPL DL<=0.05 MIU/L-ACNC: 4.67 UIU/ML (ref 0.27–4.2)
VIT B12 BLD-MCNC: 393 PG/ML (ref 211–946)

## 2019-09-15 RX ORDER — CITALOPRAM 10 MG/1
TABLET ORAL
Qty: 30 TABLET | Refills: 0 | Status: SHIPPED | OUTPATIENT
Start: 2019-09-15 | End: 2019-09-16 | Stop reason: SDUPTHER

## 2019-09-16 ENCOUNTER — OFFICE VISIT (OUTPATIENT)
Dept: FAMILY MEDICINE CLINIC | Facility: CLINIC | Age: 34
End: 2019-09-16

## 2019-09-16 VITALS
WEIGHT: 208 LBS | HEART RATE: 66 BPM | OXYGEN SATURATION: 98 % | HEIGHT: 63 IN | RESPIRATION RATE: 16 BRPM | SYSTOLIC BLOOD PRESSURE: 110 MMHG | DIASTOLIC BLOOD PRESSURE: 70 MMHG | TEMPERATURE: 97.5 F | BODY MASS INDEX: 36.86 KG/M2

## 2019-09-16 DIAGNOSIS — E53.8 VITAMIN B 12 DEFICIENCY: Primary | ICD-10-CM

## 2019-09-16 DIAGNOSIS — E78.5 HYPERLIPIDEMIA, UNSPECIFIED HYPERLIPIDEMIA TYPE: ICD-10-CM

## 2019-09-16 DIAGNOSIS — J30.1 CHRONIC ALLERGIC RHINITIS DUE TO POLLEN: ICD-10-CM

## 2019-09-16 DIAGNOSIS — G89.29 CHRONIC BILATERAL THORACIC BACK PAIN: ICD-10-CM

## 2019-09-16 DIAGNOSIS — E16.2 HYPOGLYCEMIA: ICD-10-CM

## 2019-09-16 DIAGNOSIS — R60.9 PERIPHERAL EDEMA: ICD-10-CM

## 2019-09-16 DIAGNOSIS — M54.50 CHRONIC BILATERAL LOW BACK PAIN WITHOUT SCIATICA: ICD-10-CM

## 2019-09-16 DIAGNOSIS — R11.0 NAUSEA: ICD-10-CM

## 2019-09-16 DIAGNOSIS — E53.8 VITAMIN B12 DEFICIENCY: ICD-10-CM

## 2019-09-16 DIAGNOSIS — E55.9 VITAMIN D DEFICIENCY: ICD-10-CM

## 2019-09-16 DIAGNOSIS — M54.50 CHRONIC MIDLINE LOW BACK PAIN WITHOUT SCIATICA: ICD-10-CM

## 2019-09-16 DIAGNOSIS — G47.00 INSOMNIA, UNSPECIFIED TYPE: ICD-10-CM

## 2019-09-16 DIAGNOSIS — R73.9 HYPERGLYCEMIA: ICD-10-CM

## 2019-09-16 DIAGNOSIS — M25.561 CHRONIC PAIN OF BOTH KNEES: Chronic | ICD-10-CM

## 2019-09-16 DIAGNOSIS — K58.0 IRRITABLE BOWEL SYNDROME WITH DIARRHEA: Chronic | ICD-10-CM

## 2019-09-16 DIAGNOSIS — E53.8 LOW SERUM VITAMIN B12: ICD-10-CM

## 2019-09-16 DIAGNOSIS — J30.1 ALLERGIC RHINITIS DUE TO POLLEN, UNSPECIFIED SEASONALITY: ICD-10-CM

## 2019-09-16 DIAGNOSIS — Z13.29 SCREENING FOR THYROID DISORDER: ICD-10-CM

## 2019-09-16 DIAGNOSIS — M25.562 CHRONIC PAIN OF BOTH KNEES: Chronic | ICD-10-CM

## 2019-09-16 DIAGNOSIS — I73.9 PAD (PERIPHERAL ARTERY DISEASE) (HCC): ICD-10-CM

## 2019-09-16 DIAGNOSIS — H60.503 ACUTE OTITIS EXTERNA OF BOTH EARS, UNSPECIFIED TYPE: ICD-10-CM

## 2019-09-16 DIAGNOSIS — J44.9 CHRONIC OBSTRUCTIVE PULMONARY DISEASE, UNSPECIFIED COPD TYPE (HCC): ICD-10-CM

## 2019-09-16 DIAGNOSIS — G89.29 CHRONIC MIDLINE LOW BACK PAIN WITHOUT SCIATICA: ICD-10-CM

## 2019-09-16 DIAGNOSIS — Z87.891 EX-SMOKER: ICD-10-CM

## 2019-09-16 DIAGNOSIS — F17.218 NICOTINE DEPENDENCE, CIGARETTES, WITH OTHER NICOTINE-INDUCED DISORDERS: Chronic | ICD-10-CM

## 2019-09-16 DIAGNOSIS — M54.6 CHRONIC BILATERAL THORACIC BACK PAIN: ICD-10-CM

## 2019-09-16 DIAGNOSIS — Z72.0 SMOKELESS TOBACCO USE: ICD-10-CM

## 2019-09-16 DIAGNOSIS — F32.A DEPRESSION, UNSPECIFIED DEPRESSION TYPE: ICD-10-CM

## 2019-09-16 DIAGNOSIS — G89.29 CHRONIC PAIN OF BOTH KNEES: Chronic | ICD-10-CM

## 2019-09-16 DIAGNOSIS — F41.9 ANXIETY: Chronic | ICD-10-CM

## 2019-09-16 DIAGNOSIS — L67.9: ICD-10-CM

## 2019-09-16 DIAGNOSIS — G89.29 CHRONIC BILATERAL LOW BACK PAIN WITHOUT SCIATICA: ICD-10-CM

## 2019-09-16 PROCEDURE — 99214 OFFICE O/P EST MOD 30 MIN: CPT | Performed by: NURSE PRACTITIONER

## 2019-09-16 RX ORDER — DOXYCYCLINE HYCLATE 100 MG/1
100 CAPSULE ORAL 2 TIMES DAILY
Qty: 20 CAPSULE | Refills: 0 | Status: SHIPPED | OUTPATIENT
Start: 2019-09-16 | End: 2019-10-10

## 2019-09-16 RX ORDER — CITALOPRAM 20 MG/1
20 TABLET ORAL DAILY
Qty: 30 TABLET | Refills: 5 | Status: SHIPPED | OUTPATIENT
Start: 2019-09-16 | End: 2020-02-04 | Stop reason: SDUPTHER

## 2019-09-16 RX ORDER — METHOCARBAMOL 750 MG/1
750 TABLET, FILM COATED ORAL 3 TIMES DAILY PRN
Qty: 90 TABLET | Refills: 5 | Status: SHIPPED | OUTPATIENT
Start: 2019-09-16 | End: 2019-10-29

## 2019-09-16 RX ORDER — FLUTICASONE PROPIONATE 50 MCG
2 SPRAY, SUSPENSION (ML) NASAL DAILY
Qty: 16 G | Refills: 5 | Status: SHIPPED | OUTPATIENT
Start: 2019-09-16 | End: 2020-02-04 | Stop reason: SDUPTHER

## 2019-09-16 RX ORDER — ALBUTEROL SULFATE 90 UG/1
2 AEROSOL, METERED RESPIRATORY (INHALATION) EVERY 6 HOURS PRN
Qty: 1 INHALER | Refills: 5 | Status: SHIPPED | OUTPATIENT
Start: 2019-09-16 | End: 2020-02-04 | Stop reason: SDUPTHER

## 2019-09-16 RX ORDER — ASPIRIN 81 MG/1
81 TABLET ORAL DAILY
Qty: 30 TABLET | Refills: 5 | Status: SHIPPED | OUTPATIENT
Start: 2019-09-16 | End: 2020-02-04 | Stop reason: SDUPTHER

## 2019-09-16 RX ORDER — SPIRONOLACTONE 25 MG/1
25 TABLET ORAL DAILY
Qty: 30 TABLET | Refills: 5 | Status: SHIPPED | OUTPATIENT
Start: 2019-09-16 | End: 2020-02-04 | Stop reason: SDUPTHER

## 2019-09-16 RX ORDER — OFLOXACIN 3 MG/ML
5 SOLUTION AURICULAR (OTIC) DAILY
Qty: 10 ML | Refills: 1 | Status: SHIPPED | OUTPATIENT
Start: 2019-09-16 | End: 2019-09-23

## 2019-09-16 RX ORDER — ERGOCALCIFEROL 1.25 MG/1
50000 CAPSULE ORAL 2 TIMES WEEKLY
Qty: 10 CAPSULE | Refills: 5 | Status: SHIPPED | OUTPATIENT
Start: 2019-09-16 | End: 2019-10-29 | Stop reason: SDUPTHER

## 2019-09-16 RX ORDER — LORATADINE 10 MG/1
10 TABLET ORAL DAILY
Qty: 30 TABLET | Refills: 5 | Status: SHIPPED | OUTPATIENT
Start: 2019-09-16 | End: 2020-02-04 | Stop reason: SDUPTHER

## 2019-09-16 RX ORDER — RAMELTEON 8 MG/1
8 TABLET ORAL NIGHTLY
Qty: 30 TABLET | Refills: 0 | Status: SHIPPED | OUTPATIENT
Start: 2019-09-16 | End: 2019-09-24 | Stop reason: SDUPTHER

## 2019-09-16 RX ORDER — CILOSTAZOL 100 MG/1
100 TABLET ORAL DAILY
Qty: 30 TABLET | Refills: 5 | Status: SHIPPED | OUTPATIENT
Start: 2019-09-16 | End: 2020-02-04 | Stop reason: SDUPTHER

## 2019-09-16 RX ORDER — DICYCLOMINE HYDROCHLORIDE 10 MG/1
10 CAPSULE ORAL 3 TIMES DAILY PRN
Qty: 90 CAPSULE | Refills: 2 | Status: SHIPPED | OUTPATIENT
Start: 2019-09-16 | End: 2019-10-29

## 2019-09-16 RX ORDER — ONDANSETRON 4 MG/1
4 TABLET, ORALLY DISINTEGRATING ORAL EVERY 8 HOURS PRN
Qty: 30 TABLET | Refills: 2 | Status: SHIPPED | OUTPATIENT
Start: 2019-09-16 | End: 2020-02-04 | Stop reason: SDUPTHER

## 2019-09-16 RX ORDER — CYANOCOBALAMIN 1000 UG/ML
1000 INJECTION, SOLUTION INTRAMUSCULAR; SUBCUTANEOUS
Qty: 1 ML | Refills: 5 | Status: SHIPPED | OUTPATIENT
Start: 2019-09-16 | End: 2020-02-04 | Stop reason: SDUPTHER

## 2019-09-16 RX ORDER — BLOOD-GLUCOSE METER
1 KIT MISCELLANEOUS DAILY
Qty: 1 EACH | Refills: 0 | Status: SHIPPED | OUTPATIENT
Start: 2019-09-16

## 2019-09-19 ENCOUNTER — LAB (OUTPATIENT)
Dept: LAB | Facility: OTHER | Age: 34
End: 2019-09-19

## 2019-09-19 DIAGNOSIS — R73.9 HYPERGLYCEMIA: ICD-10-CM

## 2019-09-19 DIAGNOSIS — L67.9: ICD-10-CM

## 2019-09-19 PROCEDURE — 84403 ASSAY OF TOTAL TESTOSTERONE: CPT | Performed by: NURSE PRACTITIONER

## 2019-09-19 PROCEDURE — 83527 ASSAY OF INSULIN: CPT | Performed by: NURSE PRACTITIONER

## 2019-09-19 PROCEDURE — 83525 ASSAY OF INSULIN: CPT | Performed by: NURSE PRACTITIONER

## 2019-09-19 PROCEDURE — 36415 COLL VENOUS BLD VENIPUNCTURE: CPT | Performed by: NURSE PRACTITIONER

## 2019-09-19 PROCEDURE — 84402 ASSAY OF FREE TESTOSTERONE: CPT | Performed by: NURSE PRACTITIONER

## 2019-09-22 LAB
TESTOST FREE SERPL-MCNC: 2.5 PG/ML (ref 0–4.2)
TESTOST SERPL-MCNC: 28 NG/DL (ref 8–48)

## 2019-09-23 LAB
INSULIN FREE SERPL-ACNC: 9.5 UU/ML
INSULIN SERPL-ACNC: 9.8 UU/ML

## 2019-09-24 DIAGNOSIS — G47.00 INSOMNIA, UNSPECIFIED TYPE: ICD-10-CM

## 2019-09-24 RX ORDER — RAMELTEON 8 MG/1
8 TABLET ORAL NIGHTLY
Qty: 30 TABLET | Refills: 0 | Status: SHIPPED | OUTPATIENT
Start: 2019-09-24 | End: 2019-10-29

## 2019-10-08 PROBLEM — Z87.891 EX-SMOKER: Status: ACTIVE | Noted: 2019-10-08

## 2019-10-08 PROBLEM — Z72.0 SMOKELESS TOBACCO USE: Status: ACTIVE | Noted: 2019-10-08

## 2019-10-10 ENCOUNTER — OFFICE VISIT (OUTPATIENT)
Dept: FAMILY MEDICINE CLINIC | Facility: CLINIC | Age: 34
End: 2019-10-10

## 2019-10-10 VITALS
HEART RATE: 76 BPM | RESPIRATION RATE: 16 BRPM | BODY MASS INDEX: 36.86 KG/M2 | SYSTOLIC BLOOD PRESSURE: 114 MMHG | DIASTOLIC BLOOD PRESSURE: 60 MMHG | TEMPERATURE: 97.4 F | WEIGHT: 208 LBS | HEIGHT: 63 IN | OXYGEN SATURATION: 98 %

## 2019-10-10 DIAGNOSIS — F41.9 ANXIETY: ICD-10-CM

## 2019-10-10 DIAGNOSIS — M65.321 TRIGGER INDEX FINGER OF RIGHT HAND: Primary | ICD-10-CM

## 2019-10-10 DIAGNOSIS — R63.0 DECREASED APPETITE: ICD-10-CM

## 2019-10-10 DIAGNOSIS — I73.00 RAYNAUD'S PHENOMENON WITHOUT GANGRENE: ICD-10-CM

## 2019-10-10 DIAGNOSIS — L73.9 FOLLICULITIS: ICD-10-CM

## 2019-10-10 DIAGNOSIS — L85.3 DRY SKIN: ICD-10-CM

## 2019-10-10 DIAGNOSIS — G47.00 INSOMNIA, UNSPECIFIED TYPE: ICD-10-CM

## 2019-10-10 PROCEDURE — 99214 OFFICE O/P EST MOD 30 MIN: CPT | Performed by: NURSE PRACTITIONER

## 2019-10-10 PROCEDURE — 96372 THER/PROPH/DIAG INJ SC/IM: CPT | Performed by: NURSE PRACTITIONER

## 2019-10-10 RX ORDER — DOXEPIN HYDROCHLORIDE 50 MG/1
50 CAPSULE ORAL NIGHTLY
Qty: 30 CAPSULE | Refills: 0 | Status: SHIPPED | OUTPATIENT
Start: 2019-10-10 | End: 2019-12-12

## 2019-10-10 RX ORDER — NIFEDIPINE 30 MG/1
30 TABLET, EXTENDED RELEASE ORAL DAILY
Qty: 30 TABLET | Refills: 0 | Status: SHIPPED | OUTPATIENT
Start: 2019-10-10 | End: 2019-11-08 | Stop reason: SDUPTHER

## 2019-10-10 RX ORDER — METHYLPREDNISOLONE ACETATE 80 MG/ML
80 INJECTION, SUSPENSION INTRA-ARTICULAR; INTRALESIONAL; INTRAMUSCULAR; SOFT TISSUE ONCE
Status: COMPLETED | OUTPATIENT
Start: 2019-10-10 | End: 2019-10-10

## 2019-10-10 RX ADMIN — METHYLPREDNISOLONE ACETATE 80 MG: 80 INJECTION, SUSPENSION INTRA-ARTICULAR; INTRALESIONAL; INTRAMUSCULAR; SOFT TISSUE at 17:45

## 2019-10-10 NOTE — PROGRESS NOTES
Subjective   Veronicaphillip Hicks is a 33 y.o. female who presents to the office for change of meds.     History of Present Illness     Last labs done on 9/12/19. Vit d low at 15.5. Cbc wnl. CMP WNL. a1c 5.3.tsh elevated and t4 WNL. b12 WNL. Cholesterol panel shows elevated ldl at 126, worse.     Right pointer trigger finger-acute worsening. Possible CTS of right arm. Raynaud's syndrome-acute, not improving.   Pt pops fingers all the time, went to try to do it to the right pointer finger. Pt feels like it is on fire and is swollen, cannot fully flex or extend. Been going on for two weeks, NKI. Been getting worse. Arms turning red and starting to swell when pt holds them down. Neck hurts a little bit but no more than usual. Finger tips turn purple. phalens test is positive with stiffness.   -POC: pt states raynauds for bothersome and causes pt, will try nifedipine for prevention. Steroid shot given. Xray right hand to r/o problems with right pointer finger b/c seeing other. Referred to ortho for further work up.    Anxiety/insomnia/Depression-chronic,  uncontrolled on amitriptyline and Celexa. (hx of being on several meds (failed amitriptyline too sleep, worried about seroquel so pt doesn't want to try it, gabapentin po gives pt hallucinations, failed due to ineffectiveness on cymbalta, buspar, risperdal, trazodone, hydroxyzine and prozac. Hx of seeing pennyrile, pt states dx of bipolar affective. Effexor caused serious AE. Pt also states see has been on wellbutrin but doesn't remember if it worked or not.)  -9/16/19: more anxiety, will up celexa, amitriptyline making pt too tired in the am.   POC: will increase celexa to 20mg daily, try rozerem 8mg at hs.   -10/10/19: pt states having a lot of problems with her son and her anxiety is getting worse.   -POC: will try doxepin and f/u in two weeks.     Dry skin-acute, not improving.  -poc: try zinc oxide, f/u if not imrpoving.     Folliculitis-chronic with intermittent flare  "ups-doxycycline to treat hair up. Possible HS. Nodules underneath skin pea sized hard and tender, on legs and arm and back, about six of them. poc: treatmetn with doxycycline, if continues consider maintenance abx. Added aldactone.   -today aldactone is working but dry skin. Fighting self insanely thirsty.   -POC: continue current poc for now. -ac    Decreased appetite-acute, not improving. Will treat anxiety, if this doesn't improve with anxiety improving, will readdress.     F/u in two weeks.       PMH:  IBS-chronic, moderately controlled with Bentyl 10mg tid as needed  Chronic AR-controlled with flonase and claritin  Vitamin B12 deficiency-chronic, controlled with vitamin B12 injections.  Patient does the shots at home.  Vitamin D deficiency-chronic, improving vit d to twice weekly, take with OJ or something high in vit c. Recheck in three mtns.   Peripheral arterial disease-chronic, controlled with walking, asa 81mg, and pletal 100mg daily  Chronic pain of left knee/chronic low back pain/thoracic back apin without sciatica causing unstable gait for which she uses a cane-chronic, moderately controlled with diclofenac 50mg bid and robaxin 750mg tid and compound. (flexeril doesn't work, gabapentin PO causes hallucinations.)  Hyperglycemia/weight gain-with boats of hypoglycemia-chronic, moderately controlled with diet. (pt states previous dx of \"reverse diabetes\")  -concern for pcos, pt has hair on chin and mustache getting thicker, will get alb work to reflect this.   Smoker previous, currently vapes. Does not want to stop. States smoked 3ppd x 17 years and has been vaping for about three years now.     Pt has appt with neurology on 11/7/19.     The following portions of the patient's history were reviewed and updated as appropriate: allergies, current medications, past family history, past medical history, past social history, past surgical history and problem list.    Review of Systems   Constitutional: Negative " for activity change, appetite change, chills, diaphoresis, fatigue, fever and unexpected weight change.   HENT: Negative for congestion, ear discharge, ear pain, nosebleeds, postnasal drip, rhinorrhea, sinus pain, sneezing, tinnitus and trouble swallowing.    Eyes: Negative.    Respiratory: Negative for cough, chest tightness, shortness of breath and wheezing.    Cardiovascular: Negative for chest pain, palpitations and leg swelling.   Gastrointestinal: Negative for abdominal distention, blood in stool, constipation, diarrhea, nausea and vomiting.   Genitourinary: Negative for difficulty urinating, dyspareunia, dysuria, flank pain, frequency, hematuria, menstrual problem, vaginal bleeding, vaginal discharge and vaginal pain.        Bladder spasms   Musculoskeletal: Positive for arthralgias, back pain and gait problem. Negative for neck pain and neck stiffness.   Skin: Positive for wound.   Allergic/Immunologic: Negative for environmental allergies, food allergies and immunocompromised state.   Neurological: Negative for dizziness, tremors, seizures, syncope, weakness, light-headedness, numbness and headaches.   Hematological: Does not bruise/bleed easily.   Psychiatric/Behavioral: Positive for sleep disturbance (Improving). Negative for behavioral problems, self-injury and suicidal ideas. The patient is nervous/anxious.        Past Medical History:   Diagnosis Date   • Abdominal pain    • Abnormal Pap smear of cervix    • Acute bronchitis    • Acute left otitis media    • Ankle pain    • Anxiety    • Asthma    • Attention deficit hyperactivity disorder    • Bipolar disorder (CMS/HCC)    • Candidiasis    • Candidiasis of vagina    • Depression    • Diabetes mellitus (CMS/HCC)    • Diarrhea    • Dizziness    • Dysuria    • Elbow joint pain     elbow joint - painful on movement   • Elbow joint pain    • Elevated blood pressure    • Encounter for long-term (current) use of medications     Long-term (current) use of  "other medications    • Epigastric pain    • Excessive thirst    • Fall    • Fatigue    • Feeling tired     tired all the time   • Flank pain    • Gastroesophageal reflux disease    • High risk sexual behavior    • History of malignant neoplasm of cervix    • Hordeolum internum right upper eyelid    • HPV (human papilloma virus) infection    • Hx of blood clots    • Hypokalemia    • Increased frequency of urination    • Infection of skin and subcutaneous tissue    • Irritable bowel syndrome    • Knee pain, left    • Low back pain    • Muscle weakness    • Nausea and vomiting    • Pain in limb    • Pain in wrist    • Peripheral arterial disease (CMS/HCC)    • Postartificial menopausal syndrome    • Postmenopausal state    • Serous otitis media    • Skin lesion    • Smokes tobacco daily    • Upper respiratory infection    • Urinary tract infection    • Vaginitis        Family History   Problem Relation Age of Onset   • ADD / ADHD Daughter    • Lung cancer Maternal Grandmother    • Cancer Paternal Grandmother    • Uterine cancer Paternal Grandmother    • Colon cancer Paternal Grandmother    • Diabetes Other    • Liver cancer Other    • Breast cancer Mother    • Ovarian cancer Mother    • Birth defects Brother    • Stroke Paternal Grandfather    • Endometrial cancer Neg Hx           Objective   /60   Pulse 76   Temp 97.4 °F (36.3 °C) (Oral)   Resp 16   Ht 160 cm (63\")   Wt 94.3 kg (208 lb)   LMP  (LMP Unknown) Comment: w BSO  SpO2 98%   Breastfeeding? No   BMI 36.85 kg/m²   Physical Exam   Constitutional: She is oriented to person, place, and time. She appears well-developed and well-nourished. She is cooperative. She does not appear ill.   HENT:   Head: Normocephalic.   Right Ear: Hearing, external ear and ear canal normal. There is drainage, swelling and tenderness. Tympanic membrane is bulging. Tympanic membrane is not injected, not erythematous and not retracted. A middle ear effusion is present. No " decreased hearing is noted.   Left Ear: Hearing, external ear and ear canal normal. There is drainage, swelling and tenderness. Tympanic membrane is bulging. Tympanic membrane is not injected, not erythematous and not retracted. A middle ear effusion is present. No decreased hearing is noted.   Nose: Mucosal edema, rhinorrhea and sinus tenderness present. Right sinus exhibits no maxillary sinus tenderness and no frontal sinus tenderness. Left sinus exhibits no maxillary sinus tenderness and no frontal sinus tenderness.   Mouth/Throat: Uvula is midline, oropharynx is clear and moist and mucous membranes are normal. No oropharyngeal exudate.   Eyes: Conjunctivae and lids are normal. Pupils are equal, round, and reactive to light. Right eye exhibits no discharge. Left eye exhibits no discharge. Right conjunctiva is not injected. Left conjunctiva is not injected. Right eye exhibits normal extraocular motion and no nystagmus. Left eye exhibits normal extraocular motion and no nystagmus.   Neck: Normal range of motion. Neck supple.   Cardiovascular: Normal rate, regular rhythm, normal heart sounds and intact distal pulses. Exam reveals no gallop and no friction rub.   No murmur heard.  Pulmonary/Chest: Effort normal. No respiratory distress. She has no decreased breath sounds. She has no wheezes. She has no rhonchi. She has no rales.   Abdominal: Soft. Normal appearance, normal aorta and bowel sounds are normal. She exhibits no shifting dullness, no distension, no pulsatile liver, no fluid wave, no abdominal bruit, no ascites, no pulsatile midline mass and no mass. There is no hepatosplenomegaly. There is generalized tenderness. There is no rebound, no guarding, no CVA tenderness, no tenderness at McBurney's point and negative Ramirez's sign. No hernia.   Musculoskeletal: She exhibits no edema or deformity.        Cervical back: She exhibits decreased range of motion (due to stiffness), tenderness, bony tenderness, pain  and spasm. She exhibits no swelling, no edema, no deformity, no laceration and normal pulse.        Thoracic back: She exhibits decreased range of motion, tenderness and pain. She exhibits no swelling (no swelling noted).        Lumbar back: She exhibits decreased range of motion, tenderness and pain. She exhibits no swelling (no swelling ntoed).        Hands:  Lymphadenopathy:     She has no cervical adenopathy.   Neurological: She is alert and oriented to person, place, and time. She has normal strength and normal reflexes. She displays normal reflexes. No sensory deficit.   Reflex Scores:       Patellar reflexes are 2+ on the right side and 2+ on the left side.  Wobbling gait at times due to left knee pain, pt ambulatory with walker.    Skin: Skin is warm, dry and intact. No rash noted. She is not diaphoretic. No erythema. No pallor.        Psychiatric: Her behavior is normal. Thought content normal. Her mood appears anxious. Her speech is rapid and/or pressured. Cognition and memory are normal.   Patient is dressed appropriately for weather and situation, makes eye contact, and engages in conversation. Pt seems very anxious and cries during visit.  She is attentive.   Nursing note and vitals reviewed.       PHQ-2/PHQ-9 Depression Screening 3/18/2019   Little interest or pleasure in doing things 0   Feeling down, depressed, or hopeless 0   Trouble falling or staying asleep, or sleeping too much -   Feeling tired or having little energy -   Poor appetite or overeating -   Feeling bad about yourself - or that you are a failure or have let yourself or your family down -   Trouble concentrating on things, such as reading the newspaper or watching television -   Moving or speaking so slowly that other people could have noticed. Or the opposite - being so fidgety or restless that you have been moving around a lot more than usual -   Thoughts that you would be better off dead, or of hurting yourself in some way -    Total Score 0   If you checked off any problems, how difficult have these problems made it for you to do your work, take care of things at home, or get along with other people? -         Assessment/Plan   Veronica was seen today for medication problem.    Diagnoses and all orders for this visit:    Trigger index finger of right hand  -     methylPREDNISolone acetate (DEPO-medrol) injection 80 mg  -     Ambulatory Referral to Orthopedic Surgery  -     XR Hand 3+ View Right; Future    Raynaud's phenomenon without gangrene  -     NIFEdipine XL (NIFEDICAL XL) 30 MG 24 hr tablet; Take 1 tablet by mouth Daily.    Insomnia, unspecified type  -     doxepin (SINEquan) 50 MG capsule; Take 1 capsule by mouth Every Night.    Dry skin  -     Zinc Oxide 6 % cream; Apply 1 application topically 2 (Two) Times a Day As Needed (dry skin).    Anxiety    Folliculitis    Decreased appetite               Last labs done on 9/12/19 reviewed with the patient today. Vit d low at 15.5. Cbc wnl. CMP WNL. a1c 5.3.tsh elevated and t4 WNL. b12 WNL. Cholesterol panel shows elevated ldl at 126, worse.     Right pointer trigger finger-acute worsening. Possible CTS of right arm. Raynaud's syndrome-acute, not improving.   Pt pops fingers all the time, went to try to do it to the right pointer finger. Pt feels like it is on fire and is swollen, cannot fully flex or extend. Been going on for two weeks, NKI. Been getting worse. Arms turning red and starting to swell when pt holds them down. Neck hurts a little bit but no more than usual. Finger tips turn purple. phalens test is positive with stiffness.   -POC: pt states raynauds for bothersome and causes pt, will try nifedipine for prevention. Steroid shot given. Xray right hand to r/o problems with right pointer finger b/c seeing other. Referred to ortho for further work up.    Anxiety/insomnia/Depression-chronic,  uncontrolled on amitriptyline and Celexa. (hx of being on several meds (failed  amitriptyline too sleep, worried about seroquel so pt doesn't want to try it, gabapentin po gives pt hallucinations, failed due to ineffectiveness on cymbalta, buspar, risperdal, trazodone, hydroxyzine and prozac. Hx of seeing pennyrile, pt states dx of bipolar affective. Effexor caused serious AE. Pt also states see has been on wellbutrin but doesn't remember if it worked or not.)  -9/16/19: more anxiety, will up celexa, amitriptyline making pt too tired in the am.   POC: will increase celexa to 20mg daily, try rozerem 8mg at hs.   -10/10/19: pt states having a lot of problems with her son and her anxiety is getting worse.   -POC: will try doxepin and f/u in two weeks.     Dry skin-acute, not improving.  -poc: try zinc oxide, f/u if not imrpoving.     Folliculitis-chronic with intermittent flare ups-doxycycline to treat hair up. Possible HS. Nodules underneath skin pea sized hard and tender, on legs and arm and back, about six of them. poc: treatmetn with doxycycline, if continues consider maintenance abx. Added aldactone.   -today aldactone is working but dry skin. Fighting self insanely thirsty.   -POC: continue current poc for now. -ac    Decreased appetite-acute, not improving. Will treat anxiety, if this doesn't improve with anxiety improving, will readdress.     F/u in two weeks.     Patient educated to follow-up sooner than next scheduled appointment if condition(s) worse or do not improve. Patient states understanding and is in agreeance with plan of care. An After Visit Summary was printed and given to the patient.      ABHISHEK Bowman        This document has been electronically signed by ABHISHEK Bowman on October 28, 2019 3:47 AM      EMR/Transcription Dragon Disclaimer:  Some of this note may be an electronic dragon transcription/translation of spoken language to printed text. The electronic translation of spoken language may permit erroneous, or at times, nonsensical words or phrases  to be inadvertently transcribed. Although I have reviewed the note for such errors, some may still exist.

## 2019-10-21 ENCOUNTER — LAB (OUTPATIENT)
Dept: LAB | Facility: OTHER | Age: 34
End: 2019-10-21

## 2019-10-21 ENCOUNTER — DOCUMENTATION (OUTPATIENT)
Dept: FAMILY MEDICINE CLINIC | Facility: CLINIC | Age: 34
End: 2019-10-21

## 2019-10-21 DIAGNOSIS — E78.5 HYPERLIPIDEMIA, UNSPECIFIED HYPERLIPIDEMIA TYPE: ICD-10-CM

## 2019-10-21 DIAGNOSIS — E55.9 VITAMIN D DEFICIENCY: ICD-10-CM

## 2019-10-21 DIAGNOSIS — Z13.29 SCREENING FOR THYROID DISORDER: ICD-10-CM

## 2019-10-21 LAB
CHOLEST SERPL-MCNC: 218 MG/DL (ref 150–200)
HDLC SERPL-MCNC: 59 MG/DL (ref 40–59)
LDLC SERPL CALC-MCNC: 142 MG/DL
LDLC/HDLC SERPL: 2.41 {RATIO} (ref 0–3.22)
TRIGL SERPL-MCNC: 83 MG/DL
VLDLC SERPL-MCNC: 16.6 MG/DL

## 2019-10-21 PROCEDURE — 84443 ASSAY THYROID STIM HORMONE: CPT | Performed by: NURSE PRACTITIONER

## 2019-10-21 PROCEDURE — 82306 VITAMIN D 25 HYDROXY: CPT | Performed by: NURSE PRACTITIONER

## 2019-10-21 PROCEDURE — 84439 ASSAY OF FREE THYROXINE: CPT | Performed by: NURSE PRACTITIONER

## 2019-10-21 PROCEDURE — 80061 LIPID PANEL: CPT | Performed by: NURSE PRACTITIONER

## 2019-10-21 RX ORDER — PRENATAL VIT 91/IRON/FOLIC/DHA 28-975-200
COMBINATION PACKAGE (EA) ORAL 2 TIMES DAILY
Qty: 14 G | Refills: 0 | Status: SHIPPED | OUTPATIENT
Start: 2019-10-21 | End: 2019-10-31

## 2019-10-21 NOTE — PROGRESS NOTES
Pt states pimple popped up two weeks, pt popped it and now it looks like ringworm.    Area is on left arm by elbow, looks like ringworg with raised border, itches behind it.     Dx ringworm.     POC: contact precautions educated on, treated with antifungal topically.

## 2019-10-22 DIAGNOSIS — Z13.0 SCREENING FOR DEFICIENCY ANEMIA: ICD-10-CM

## 2019-10-22 DIAGNOSIS — R79.89 ELEVATED TSH: ICD-10-CM

## 2019-10-22 DIAGNOSIS — R73.9 HYPERGLYCEMIA: ICD-10-CM

## 2019-10-22 DIAGNOSIS — E53.8 VITAMIN B 12 DEFICIENCY: ICD-10-CM

## 2019-10-22 DIAGNOSIS — E55.9 VITAMIN D DEFICIENCY: Primary | ICD-10-CM

## 2019-10-22 DIAGNOSIS — E78.5 HYPERLIPIDEMIA, UNSPECIFIED HYPERLIPIDEMIA TYPE: ICD-10-CM

## 2019-10-22 DIAGNOSIS — E16.2 HYPOGLYCEMIA: ICD-10-CM

## 2019-10-22 LAB
25(OH)D3 SERPL-MCNC: 26.5 NG/ML (ref 30–100)
T4 FREE SERPL-MCNC: 1.14 NG/DL (ref 0.93–1.7)
TSH SERPL DL<=0.05 MIU/L-ACNC: 3.1 UIU/ML (ref 0.27–4.2)

## 2019-10-22 RX ORDER — ATORVASTATIN CALCIUM 20 MG/1
20 TABLET, FILM COATED ORAL NIGHTLY
Qty: 30 TABLET | Refills: 5 | Status: SHIPPED | OUTPATIENT
Start: 2019-10-22 | End: 2020-02-04 | Stop reason: SDUPTHER

## 2019-10-28 ENCOUNTER — TELEPHONE (OUTPATIENT)
Dept: FAMILY MEDICINE CLINIC | Facility: CLINIC | Age: 34
End: 2019-10-28

## 2019-10-29 ENCOUNTER — OFFICE VISIT (OUTPATIENT)
Dept: FAMILY MEDICINE CLINIC | Facility: CLINIC | Age: 34
End: 2019-10-29

## 2019-10-29 VITALS
SYSTOLIC BLOOD PRESSURE: 118 MMHG | TEMPERATURE: 98.6 F | HEIGHT: 63 IN | HEART RATE: 84 BPM | BODY MASS INDEX: 36.64 KG/M2 | DIASTOLIC BLOOD PRESSURE: 68 MMHG | WEIGHT: 206.8 LBS | OXYGEN SATURATION: 98 %

## 2019-10-29 DIAGNOSIS — G47.00 INSOMNIA, UNSPECIFIED TYPE: ICD-10-CM

## 2019-10-29 DIAGNOSIS — R19.7 DIARRHEA, UNSPECIFIED TYPE: ICD-10-CM

## 2019-10-29 DIAGNOSIS — R10.84 GENERALIZED ABDOMINAL PAIN: ICD-10-CM

## 2019-10-29 DIAGNOSIS — J40 BRONCHITIS: ICD-10-CM

## 2019-10-29 DIAGNOSIS — F32.A DEPRESSION, UNSPECIFIED DEPRESSION TYPE: ICD-10-CM

## 2019-10-29 DIAGNOSIS — L73.9 FOLLICULITIS: ICD-10-CM

## 2019-10-29 DIAGNOSIS — J01.00 ACUTE NON-RECURRENT MAXILLARY SINUSITIS: ICD-10-CM

## 2019-10-29 DIAGNOSIS — R43.8 BAD ORAL TASTE: ICD-10-CM

## 2019-10-29 DIAGNOSIS — M65.321 TRIGGER INDEX FINGER OF RIGHT HAND: ICD-10-CM

## 2019-10-29 DIAGNOSIS — G56.01 RIGHT CARPAL TUNNEL SYNDROME: ICD-10-CM

## 2019-10-29 DIAGNOSIS — B35.9 RINGWORM: ICD-10-CM

## 2019-10-29 DIAGNOSIS — E55.9 VITAMIN D DEFICIENCY: Primary | ICD-10-CM

## 2019-10-29 DIAGNOSIS — R11.2 NAUSEA AND VOMITING, INTRACTABILITY OF VOMITING NOT SPECIFIED, UNSPECIFIED VOMITING TYPE: ICD-10-CM

## 2019-10-29 DIAGNOSIS — I73.00 RAYNAUD'S DISEASE WITHOUT GANGRENE: ICD-10-CM

## 2019-10-29 DIAGNOSIS — R25.2 MUSCLE CRAMP: ICD-10-CM

## 2019-10-29 DIAGNOSIS — K21.9 GASTROESOPHAGEAL REFLUX DISEASE WITHOUT ESOPHAGITIS: ICD-10-CM

## 2019-10-29 DIAGNOSIS — F41.9 ANXIETY: Chronic | ICD-10-CM

## 2019-10-29 PROCEDURE — 99214 OFFICE O/P EST MOD 30 MIN: CPT | Performed by: NURSE PRACTITIONER

## 2019-10-29 PROCEDURE — 96372 THER/PROPH/DIAG INJ SC/IM: CPT | Performed by: NURSE PRACTITIONER

## 2019-10-29 RX ORDER — ERGOCALCIFEROL 1.25 MG/1
50000 CAPSULE ORAL 2 TIMES WEEKLY
Qty: 10 CAPSULE | Refills: 5 | Status: SHIPPED | OUTPATIENT
Start: 2019-10-31 | End: 2020-02-04 | Stop reason: SDUPTHER

## 2019-10-29 RX ORDER — ALBUTEROL SULFATE 1.25 MG/3ML
1 SOLUTION RESPIRATORY (INHALATION) EVERY 6 HOURS PRN
Qty: 30 VIAL | Refills: 5 | Status: SHIPPED | OUTPATIENT
Start: 2019-10-29 | End: 2020-02-04 | Stop reason: SDUPTHER

## 2019-10-29 RX ORDER — CLOTRIMAZOLE AND BETAMETHASONE DIPROPIONATE 10; .64 MG/G; MG/G
CREAM TOPICAL 2 TIMES DAILY
Qty: 15 G | Refills: 0 | Status: SHIPPED | OUTPATIENT
Start: 2019-10-29 | End: 2020-02-04 | Stop reason: SDUPTHER

## 2019-10-29 RX ORDER — PSEUDOEPHEDRINE HYDROCHLORIDE 60 MG/1
60 TABLET, FILM COATED ORAL EVERY 6 HOURS PRN
Qty: 20 TABLET | Refills: 0 | Status: SHIPPED | OUTPATIENT
Start: 2019-10-29 | End: 2019-11-08 | Stop reason: SDUPTHER

## 2019-10-29 RX ORDER — LEVOFLOXACIN 500 MG/1
500 TABLET, FILM COATED ORAL DAILY
Qty: 7 TABLET | Refills: 0 | Status: SHIPPED | OUTPATIENT
Start: 2019-10-29 | End: 2019-12-12

## 2019-10-29 RX ORDER — ORPHENADRINE CITRATE, ASPIRIN AND CAFFEINE 50; 770; 60 MG/1; MG/1; MG/1
1 TABLET ORAL 3 TIMES DAILY PRN
Qty: 90 TABLET | Refills: 0 | Status: SHIPPED | OUTPATIENT
Start: 2019-10-29 | End: 2020-07-10

## 2019-10-29 RX ORDER — TRIAMCINOLONE ACETONIDE 40 MG/ML
40 INJECTION, SUSPENSION INTRA-ARTICULAR; INTRAMUSCULAR ONCE
Status: COMPLETED | OUTPATIENT
Start: 2019-10-29 | End: 2019-10-29

## 2019-10-29 RX ADMIN — TRIAMCINOLONE ACETONIDE 40 MG: 40 INJECTION, SUSPENSION INTRA-ARTICULAR; INTRAMUSCULAR at 15:10

## 2019-11-05 ENCOUNTER — TELEPHONE (OUTPATIENT)
Dept: FAMILY MEDICINE CLINIC | Facility: CLINIC | Age: 34
End: 2019-11-05

## 2019-11-05 DIAGNOSIS — R10.84 GENERALIZED ABDOMINAL PAIN: Primary | ICD-10-CM

## 2019-11-05 DIAGNOSIS — R11.2 NAUSEA AND VOMITING, INTRACTABILITY OF VOMITING NOT SPECIFIED, UNSPECIFIED VOMITING TYPE: ICD-10-CM

## 2019-11-05 NOTE — TELEPHONE ENCOUNTER
Regarding: RE: Non-Urgent Medical Question  Contact: [phone]  ----- Message from Mychart, Generic sent at 11/5/2019  5:00 PM EST -----    That's fine. I just wanna get to the bottom of it    ----- Message -----  From: ABHISHEK Bowman  Sent: 11/5/19 4:57 PM  To: Veronica Hicks  Subject: RE: Non-Urgent Medical Question    Well just because the ultrasound is negative, doesn't mean your GB function is not impaired, we can order a hida scan if you would like and/or refer you to GI to do a scope if needed. Let me know. hidas scans we do not do here, it would have to be at [facility], [place], operated by [facility] or [facility], [place]     ----- Message -----     From: Veronica Hicks     Sent: 11/5/2019  3:34 PM CST       To: ABHISHEK Bowman  Subject: RE: Non-Urgent Medical Question    So far just continued symptoms. But this stuff happens off and on and has for about 3 years. Honestly I'm relieved that it's not my gallbladder but was almost hoping it was so I could finally know why it gets to hurting so bad.    ----- Message -----  From: ABHISHEK Bowman  Sent: 11/5/19 4:25 PM  To: Veronica Hicks  Subject: RE: Non-Urgent Medical Question    Ultrasound is negative, can you please let me know what symptoms you are still having, if things have worsened, improved, or any changes I can decide what the next step would be?    ----- Message -----     From: Veronica Hicks     Sent: 11/4/2019  5:48 PM CST       To: ABHISHEK Bowman  Subject: Non-Urgent Medical Question    Since reaching me by phone is difficult could you contact me on here when my ultrasound results come back?

## 2019-11-08 ENCOUNTER — PATIENT MESSAGE (OUTPATIENT)
Dept: FAMILY MEDICINE CLINIC | Facility: CLINIC | Age: 34
End: 2019-11-08

## 2019-11-08 DIAGNOSIS — J40 BRONCHITIS: ICD-10-CM

## 2019-11-08 DIAGNOSIS — I73.00 RAYNAUD'S PHENOMENON WITHOUT GANGRENE: ICD-10-CM

## 2019-11-08 RX ORDER — MIRTAZAPINE 7.5 MG/1
7.5 TABLET, FILM COATED ORAL NIGHTLY
Qty: 30 TABLET | Refills: 5 | Status: SHIPPED | OUTPATIENT
Start: 2019-11-08 | End: 2020-02-04 | Stop reason: SDUPTHER

## 2019-11-08 RX ORDER — PSEUDOEPHEDRINE HYDROCHLORIDE 60 MG/1
60 TABLET, FILM COATED ORAL EVERY 6 HOURS PRN
Qty: 20 TABLET | Refills: 0 | Status: SHIPPED | OUTPATIENT
Start: 2019-11-08 | End: 2020-02-04

## 2019-11-08 RX ORDER — CYCLOBENZAPRINE HCL 10 MG
10 TABLET ORAL 3 TIMES DAILY PRN
Qty: 30 TABLET | Refills: 0 | Status: SHIPPED | OUTPATIENT
Start: 2019-11-08 | End: 2019-12-03

## 2019-11-08 RX ORDER — DEXTROMETHORPHAN HYDROBROMIDE AND PROMETHAZINE HYDROCHLORIDE 15; 6.25 MG/5ML; MG/5ML
5 SYRUP ORAL NIGHTLY PRN
Qty: 118 ML | Refills: 0 | Status: SHIPPED | OUTPATIENT
Start: 2019-11-08 | End: 2020-02-04

## 2019-11-08 RX ORDER — NIFEDIPINE 30 MG/1
30 TABLET, EXTENDED RELEASE ORAL DAILY
Qty: 30 TABLET | Refills: 0 | Status: SHIPPED | OUTPATIENT
Start: 2019-11-08 | End: 2020-02-04

## 2019-11-17 PROBLEM — R43.8: Status: ACTIVE | Noted: 2019-11-17

## 2019-11-17 PROBLEM — J01.00 ACUTE NON-RECURRENT MAXILLARY SINUSITIS: Status: ACTIVE | Noted: 2019-11-17

## 2019-11-17 PROBLEM — R25.2 MUSCLE CRAMP: Status: ACTIVE | Noted: 2019-11-17

## 2019-11-17 PROBLEM — K21.9 GASTROESOPHAGEAL REFLUX DISEASE WITHOUT ESOPHAGITIS: Status: ACTIVE | Noted: 2019-11-17

## 2019-11-17 PROBLEM — R11.2 NAUSEA AND VOMITING: Status: ACTIVE | Noted: 2019-11-17

## 2019-11-17 PROBLEM — R10.84 GENERALIZED ABDOMINAL PAIN: Status: ACTIVE | Noted: 2019-11-17

## 2019-11-17 PROBLEM — R19.7 DIARRHEA: Status: ACTIVE | Noted: 2019-11-17

## 2019-11-17 PROBLEM — M65.321 TRIGGER INDEX FINGER OF RIGHT HAND: Status: ACTIVE | Noted: 2019-11-17

## 2019-11-17 PROBLEM — I73.00 RAYNAUD'S DISEASE WITHOUT GANGRENE: Status: ACTIVE | Noted: 2019-11-17

## 2019-11-17 PROBLEM — G56.01 RIGHT CARPAL TUNNEL SYNDROME: Status: ACTIVE | Noted: 2019-11-17

## 2019-11-17 PROBLEM — L73.9 FOLLICULITIS: Status: ACTIVE | Noted: 2019-11-17

## 2019-11-17 PROBLEM — B35.9 RINGWORM: Status: ACTIVE | Noted: 2019-11-17

## 2019-11-22 ENCOUNTER — TELEPHONE (OUTPATIENT)
Dept: FAMILY MEDICINE CLINIC | Facility: CLINIC | Age: 34
End: 2019-11-22

## 2019-11-22 RX ORDER — KETOROLAC TROMETHAMINE 10 MG/1
10 TABLET, FILM COATED ORAL EVERY 6 HOURS PRN
Qty: 15 TABLET | Refills: 0 | Status: SHIPPED | OUTPATIENT
Start: 2019-11-22 | End: 2019-11-22 | Stop reason: SDUPTHER

## 2019-11-22 RX ORDER — KETOROLAC TROMETHAMINE 10 MG/1
10 TABLET, FILM COATED ORAL EVERY 6 HOURS PRN
Qty: 15 TABLET | Refills: 0 | Status: SHIPPED | OUTPATIENT
Start: 2019-11-22 | End: 2019-12-03

## 2019-11-22 NOTE — TELEPHONE ENCOUNTER
She said she had some teeth pulled and wants to know if you can send her something in for pain. She said the dentist did not give her any thing. She said the numbness is wearing off and the otc motrin is not helping. She realizes you do not prescribe controls. MCP

## 2019-11-22 NOTE — TELEPHONE ENCOUNTER
"I can call in a ibuprofen 600mg or I can do troadol or etalorac for inflammation and pain.     Please let her know that we have been doing a lot of call in treatments which we don't mind if we are booked full but management does not want us \"treating\" patients without visits so they are going to start enforcing that so next time we may need an appt for something like that. Let me know which she prefers please and what pharmacy.   "

## 2019-11-22 NOTE — TELEPHONE ENCOUNTER
I tried to call her and even sent a my chart message to her and she called me back crying in pain. She said she wants to try Toradol. Send to [facility] in [place]

## 2019-12-02 RX ORDER — KETOROLAC TROMETHAMINE 10 MG/1
10 TABLET, FILM COATED ORAL EVERY 6 HOURS PRN
Qty: 15 TABLET | Refills: 0 | OUTPATIENT
Start: 2019-12-02

## 2019-12-03 ENCOUNTER — OFFICE VISIT (OUTPATIENT)
Dept: FAMILY MEDICINE CLINIC | Facility: CLINIC | Age: 34
End: 2019-12-03

## 2019-12-03 VITALS
RESPIRATION RATE: 16 BRPM | TEMPERATURE: 98.2 F | OXYGEN SATURATION: 100 % | WEIGHT: 204 LBS | SYSTOLIC BLOOD PRESSURE: 124 MMHG | HEIGHT: 63 IN | HEART RATE: 85 BPM | BODY MASS INDEX: 36.14 KG/M2 | DIASTOLIC BLOOD PRESSURE: 90 MMHG

## 2019-12-03 DIAGNOSIS — R10.30 LOWER ABDOMINAL PAIN: ICD-10-CM

## 2019-12-03 DIAGNOSIS — L29.9 ITCHY SKIN: ICD-10-CM

## 2019-12-03 DIAGNOSIS — K12.2 MOUTH ABSCESS: Primary | ICD-10-CM

## 2019-12-03 DIAGNOSIS — M65.321 TRIGGER INDEX FINGER OF RIGHT HAND: ICD-10-CM

## 2019-12-03 DIAGNOSIS — R23.8 SKIN SENSITIVITY: ICD-10-CM

## 2019-12-03 DIAGNOSIS — R73.9 HYPERGLYCEMIA: ICD-10-CM

## 2019-12-03 DIAGNOSIS — I73.00 RAYNAUD'S DISEASE WITHOUT GANGRENE: ICD-10-CM

## 2019-12-03 DIAGNOSIS — E16.2 HYPOGLYCEMIA: Chronic | ICD-10-CM

## 2019-12-03 DIAGNOSIS — G56.01 RIGHT CARPAL TUNNEL SYNDROME: ICD-10-CM

## 2019-12-03 DIAGNOSIS — R60.9 PERIPHERAL EDEMA: ICD-10-CM

## 2019-12-03 DIAGNOSIS — T14.8XXA BRUISING: ICD-10-CM

## 2019-12-03 PROCEDURE — 99214 OFFICE O/P EST MOD 30 MIN: CPT | Performed by: NURSE PRACTITIONER

## 2019-12-03 RX ORDER — FLUCONAZOLE 100 MG/1
100 TABLET ORAL DAILY
Qty: 5 TABLET | Refills: 0 | Status: SHIPPED | OUTPATIENT
Start: 2019-12-03 | End: 2019-12-08

## 2019-12-03 RX ORDER — CYCLOBENZAPRINE HCL 10 MG
10 TABLET ORAL 3 TIMES DAILY PRN
Qty: 90 TABLET | Refills: 5 | Status: SHIPPED | OUTPATIENT
Start: 2019-12-03 | End: 2020-08-07

## 2019-12-03 RX ORDER — DOXYCYCLINE 100 MG/1
100 TABLET ORAL 2 TIMES DAILY
Qty: 20 TABLET | Refills: 0 | Status: SHIPPED | OUTPATIENT
Start: 2019-12-03 | End: 2019-12-12

## 2019-12-04 DIAGNOSIS — R73.9 HYPERGLYCEMIA: ICD-10-CM

## 2019-12-04 DIAGNOSIS — R33.9 URINARY RETENTION: ICD-10-CM

## 2019-12-04 DIAGNOSIS — R10.30 LOWER ABDOMINAL PAIN: Primary | ICD-10-CM

## 2019-12-04 DIAGNOSIS — K04.7 TOOTH ABSCESS: ICD-10-CM

## 2019-12-04 DIAGNOSIS — E16.2 HYPOGLYCEMIA: ICD-10-CM

## 2019-12-04 DIAGNOSIS — R22.40 LOCALIZED SWELLING OF LOWER LEG: ICD-10-CM

## 2019-12-04 DIAGNOSIS — R23.8 SKIN SENSITIVITY: ICD-10-CM

## 2019-12-04 DIAGNOSIS — R23.3 BRUISES EASILY: ICD-10-CM

## 2019-12-04 DIAGNOSIS — R53.83 OTHER FATIGUE: ICD-10-CM

## 2019-12-04 DIAGNOSIS — R34 URINE OUTPUT LOW: ICD-10-CM

## 2019-12-04 RX ORDER — CHLORHEXIDINE GLUCONATE 0.12 MG/ML
15 RINSE ORAL 2 TIMES DAILY PRN
Qty: 118 ML | Refills: 0 | Status: SHIPPED | OUTPATIENT
Start: 2019-12-04 | End: 2020-02-04

## 2019-12-10 ENCOUNTER — TELEPHONE (OUTPATIENT)
Dept: FAMILY MEDICINE CLINIC | Facility: CLINIC | Age: 34
End: 2019-12-10

## 2019-12-10 DIAGNOSIS — E87.6 HYPOKALEMIA: ICD-10-CM

## 2019-12-12 RX ORDER — POTASSIUM CHLORIDE 750 MG/1
10 TABLET, FILM COATED, EXTENDED RELEASE ORAL DAILY
Qty: 30 TABLET | Refills: 2 | Status: SHIPPED | OUTPATIENT
Start: 2019-12-12 | End: 2020-02-04 | Stop reason: SDUPTHER

## 2019-12-12 RX ORDER — DICYCLOMINE HYDROCHLORIDE 10 MG/1
10 CAPSULE ORAL 3 TIMES DAILY PRN
Qty: 60 CAPSULE | Refills: 0 | Status: SHIPPED | OUTPATIENT
Start: 2019-12-12 | End: 2020-02-10

## 2019-12-18 PROBLEM — K21.00 GASTROESOPHAGEAL REFLUX DISEASE WITH ESOPHAGITIS: Status: RESOLVED | Noted: 2017-11-14 | Resolved: 2019-12-18

## 2019-12-18 NOTE — PROGRESS NOTES
"Subjective   Veronica Hicks is a 34 y.o. female who presents to the office for infection after dental exam.       History of Present Illness    Last labs done on 10/21/19.    Mouth abscess-acute, worsening  -pt states she got her tooth pulled recently, that he pulled some of the wrong teeth and that she has been \"spitting up pieces of her jaw,\" \"food getting caught under flap left,\" and pain/swelling on left lower side.   -poc: physical exam does show flap as well as irritation and inflammation of lower left gum line. encouraged pt to f/u with dental surgeon about her concerns, rinse mouth several times a day roni after eating, prescribed doxy and diflucan, f/u with dental sx if not improving.     Right pointer trigger finger/Possible CTS of right arm/Raynaud's syndrome-acute, improving with nifedipine 30mg 24hr tablet and splint and rest.   -10/10/19: Pt pops fingers all the time, went to try to do it to the right pointer finger. Pt feels like it is on fire and is swollen, cannot fully flex or extend. Been going on for two weeks, NKI. Been getting worse. Arms turning red and starting to swell when pt holds them down. Neck hurts a little bit but no more than usual. Finger tips turn purple. phalens test is positive with stiffness. POC: pt states raynauds for bothersome and causes pt, will try nifedipine for prevention. Steroid shot given. Xray right hand to r/o problems with right pointer finger b/c seeing other. Referred to ortho for further work up.  -10/29/19: steroid shot helped tremendously, not as tender and good ROM. Hold off on ortho referral for now. POC: will monitor for now.   -12/3/19: improving, will continue to monitor.     Hx of abd hernia/lower abd pain-acute, worsening  -pt states four days ago she did some heavy lifting thinks she irritated her hernia she has a hx of. Describes exacerbations as positional with leaning back on pillow feeling better but standing and without constant pressure-causing " her so much pain she cries. Pt states hx of lower abd hernia five years ago but it got better. Previous u/s of entire abd done on 11/4/19 was neg.   -POC: will do lab workup for hpylori to be safe and then do ct scan of abd and pelvis and send for further eval to specialist if needed.     Bruising/peripheral swelling/bruising/skin sensitivity/skin itching-acute new problems not improving.   Patient has a few new acute c/o that she mentions and wants a work up for. Pt states veins are more prominent on the left vs the right. She has itching. Bruising on legs bilaterally, states legs feel tight all over, skin is sensitive to touch. Will gets venous us and lab work up and then go from there.     Hyperglycemia with hypoglycemia-chronic, controlled with diet.  -pt needs new glucometer and refills on lancets and strips, gave pt sample glucometer contour next one, call for refills when needed    F/u pending future tests and labs.        At next visit: pt mentions bladder volume of 109ml, didn't fill like bladder was full on previous test. Discuss bladder issues at next visit.     Address at future visit:   Anxiety/insomnia/Depression-chronic,  uncontrolled on amitriptyline and Celexa. (hx of being on several meds (failed amitriptyline too sleep, worried about seroquel so pt doesn't want to try it, gabapentin po gives pt hallucinations, failed due to ineffectiveness on cymbalta, buspar, risperdal, trazodone, hydroxyzine and prozac. Hx of seeing pennyrile, pt states dx of bipolar affective. Effexor caused serious AE. Pt also states see has been on wellbutrin but doesn't remember if it worked or not.)  -9/16/19: more anxiety, will up celexa, amitriptyline making pt too tired in the am.   POC: will increase celexa to 20mg daily, try rozerem 8mg at hs.   -10/10/19: pt states having a lot of problems with her son and her anxiety is getting worse. Added doxepin   -10/29/19: states helping some with doxepin but celexa helping  pretty good  -POC: pt wants to give meds a little bit of time. Will continue to monitor.     Ringworm-acute, not improving on betadine.   -did not  lotrisome.   -POC: pt will  lotrisome    Folliculitis-chronic with intermittent flare ups-doxycycline to treat flair up. Possible HS. Nodules underneath skin pea sized hard and tender, on legs and arm and back, about six of them. poc: treatmetn with doxycycline, if continues consider maintenance abx. Added aldactone.   -today aldactone is working but dry skin. Fighting self insanely thirsty.   -10/29/19: worsening will call when done with with abx for maintenance abx.   -POC: pt will call to update.   Abd cramping, rlq pain colicky, reflux issues, some n/v at times, diarrhea, foul smell taste with this -chronic, worsening over past six mtns.   -failed otc PPI, script PPI, zantac, otc antacids. Worse at hs, had barium swallow several years ago dx it. Affected by greasier foods, demar foods, fatty foods. Pt has not been able to eat much. Generalized abd pain at times. Hx of hernia in RLQ. No urination problems.   -POC: u/s of abdomin for further evaluation.   Vitamin D deficiency-chronic, not improving vit d weekly  -POC: will increase to twice weekly, take with OJ or something high in vit c. Recheck in three mtns.     PMH:  Dry skin-acute, improving with otc med. Zinc oxide not covered.   IBS-chronic, moderately controlled with Bentyl 10mg tid as needed  Chronic AR-controlled with flonase and claritin  Vitamin B12 deficiency-chronic, controlled with vitamin B12 injections.  Patient does the shots at home.  Peripheral arterial disease-chronic, controlled with walking, asa 81mg, and pletal 100mg daily  Chronic pain of left knee/chronic low back pain/thoracic back apin without sciatica causing unstable gait for which she uses a cane-chronic, moderately controlled with diclofenac 50mg bid and robaxin 750mg tid and compound. (flexeril doesn't work, gabapentin PO  "causes hallucinations.)  Hyperglycemia/weight gain-with boats of hypoglycemia-chronic, moderately controlled with diet. (pt states previous dx of \"reverse diabetes\")  -concern for pcos, pt has hair on chin and mustache getting thicker, will get alb work to reflect this.   Smoker previous, currently vapes. Does not want to stop. States smoked 3ppd x 17 years and has been vaping for about three years now.     Pt has appt with neurology on 11/7/19.     The following portions of the patient's history were reviewed and updated as appropriate: allergies, current medications, past family history, past medical history, past social history, past surgical history and problem list.    Review of Systems   Constitutional: Negative for activity change, appetite change, chills, diaphoresis, fatigue, fever and unexpected weight change.   HENT: Positive for dental problem and facial swelling. Negative for congestion, ear discharge, ear pain, nosebleeds, postnasal drip, rhinorrhea, sinus pressure, sinus pain, sneezing, sore throat, tinnitus and trouble swallowing.    Eyes: Negative.    Respiratory: Negative for cough, chest tightness, shortness of breath and wheezing.    Cardiovascular: Positive for leg swelling. Negative for chest pain and palpitations.   Gastrointestinal: Positive for abdominal distention and abdominal pain. Negative for blood in stool, constipation, diarrhea, nausea and vomiting.   Genitourinary: Negative for difficulty urinating, dyspareunia, dysuria, flank pain, frequency, hematuria, menstrual problem, vaginal bleeding, vaginal discharge and vaginal pain.   Skin: Negative for rash and wound.   Allergic/Immunologic: Negative for environmental allergies, food allergies and immunocompromised state.   Neurological: Negative for dizziness, tremors, seizures, syncope, weakness, light-headedness, numbness and headaches.   Hematological: Bruises/bleeds easily.   Psychiatric/Behavioral: Negative for behavioral problems, " "self-injury, sleep disturbance and suicidal ideas. The patient is not nervous/anxious.          Objective   /90   Pulse 85   Temp 98.2 °F (36.8 °C) (Oral)   Resp 16   Ht 160 cm (63\")   Wt 92.5 kg (204 lb)   LMP  (LMP Unknown) Comment: w BSO  SpO2 100%   Breastfeeding No   BMI 36.14 kg/m²   Physical Exam   Constitutional: She is oriented to person, place, and time. She appears well-developed and well-nourished. She is cooperative. No distress.   HENT:   Head: Normocephalic.   Right Ear: Hearing, tympanic membrane, external ear and ear canal normal.   Left Ear: Hearing, tympanic membrane, external ear and ear canal normal.   Nose: Nose normal. No mucosal edema or rhinorrhea. Right sinus exhibits no maxillary sinus tenderness and no frontal sinus tenderness. Left sinus exhibits no maxillary sinus tenderness and no frontal sinus tenderness.   Mouth/Throat: Uvula is midline, oropharynx is clear and moist and mucous membranes are normal. Tonsils are 0 on the right. Tonsils are 0 on the left. No tonsillar exudate.       Eyes: Conjunctivae and lids are normal. Right eye exhibits no discharge. No foreign body present in the right eye. Left eye exhibits no discharge. No foreign body present in the left eye. No scleral icterus.   Cardiovascular: Normal rate, regular rhythm, normal heart sounds and intact distal pulses. Exam reveals no gallop and no friction rub.   No murmur heard.  Pulses:       Popliteal pulses are 2+ on the right side, and 2+ on the left side.        Dorsalis pedis pulses are 2+ on the right side, and 2+ on the left side.   1+ nonpitting edema of LE bilaterally   Pulmonary/Chest: Effort normal and breath sounds normal. No respiratory distress. She has no wheezes. She has no rales.   Abdominal: Soft. Normal appearance and bowel sounds are normal. She exhibits no shifting dullness, no distension, no pulsatile liver, no fluid wave, no abdominal bruit, no ascites, no pulsatile midline mass and no " mass. There is no hepatosplenomegaly. There is generalized tenderness and tenderness in the suprapubic area. There is no rigidity, no rebound, no guarding and no CVA tenderness. Hernia: unable to r/o.       Lymphadenopathy:     She has no cervical adenopathy.   Neurological: She is alert and oriented to person, place, and time. She is not disoriented. Coordination and gait normal.   Skin: Skin is warm, dry and intact. Capillary refill takes less than 2 seconds. Bruising and ecchymosis noted. She is not diaphoretic. No pallor.        Psychiatric: She has a normal mood and affect. Her speech is normal and behavior is normal. Thought content normal. She is not actively hallucinating. Cognition and memory are normal.   Patient is dressed appropriately for weather and situation, makes eye contact, and engages in conversation.  She is attentive.   Nursing note and vitals reviewed.       PHQ-2/PHQ-9 Depression Screening 3/18/2019   Little interest or pleasure in doing things 0   Feeling down, depressed, or hopeless 0   Trouble falling or staying asleep, or sleeping too much -   Feeling tired or having little energy -   Poor appetite or overeating -   Feeling bad about yourself - or that you are a failure or have let yourself or your family down -   Trouble concentrating on things, such as reading the newspaper or watching television -   Moving or speaking so slowly that other people could have noticed. Or the opposite - being so fidgety or restless that you have been moving around a lot more than usual -   Thoughts that you would be better off dead, or of hurting yourself in some way -   Total Score 0   If you checked off any problems, how difficult have these problems made it for you to do your work, take care of things at home, or get along with other people? -         Assessment/Plan   Veronica was seen today for dental injury.    Diagnoses and all orders for this visit:    Mouth abscess  -     Discontinue: doxycycline  "(ADOXA) 100 MG tablet; Take 1 tablet by mouth 2 (Two) Times a Day for 10 days.    Raynaud's disease without gangrene    Hypoglycemia    Right carpal tunnel syndrome    Trigger index finger of right hand    Hyperglycemia    Lower abdominal pain    Bruising    Peripheral edema    Skin sensitivity    Itchy skin    Other orders  -     cyclobenzaprine (FLEXERIL) 10 MG tablet; Take 1 tablet by mouth 3 (Three) Times a Day As Needed for Muscle Spasms.  -     fluconazole (DIFLUCAN) 100 MG tablet; Take 1 tablet by mouth Daily for 5 days. Take 1-150 mg tablet now and 1-150mg tablet in 7 days.               Last labs done on 10/21/19.    Mouth abscess-acute, worsening  -pt states she got her tooth pulled recently, that he pulled some of the wrong teeth and that she has been \"spitting up pieces of her jaw,\" \"food getting caught under flap left,\" and pain/swelling on left lower side.   -poc: physical exam does show flap as well as irritation and inflammation of lower left gum line. encouraged pt to f/u with dental surgeon about her concerns, rinse mouth several times a day roni after eating, prescribed doxy and diflucan, f/u with dental sx if not improving.     Right pointer trigger finger/Possible CTS of right arm/Raynaud's syndrome-acute, improving with nifedipine 30mg 24hr tablet and splint and rest.   -10/10/19: Pt pops fingers all the time, went to try to do it to the right pointer finger. Pt feels like it is on fire and is swollen, cannot fully flex or extend. Been going on for two weeks, NKI. Been getting worse. Arms turning red and starting to swell when pt holds them down. Neck hurts a little bit but no more than usual. Finger tips turn purple. phalens test is positive with stiffness. POC: pt states raynauds for bothersome and causes pt, will try nifedipine for prevention. Steroid shot given. Xray right hand to r/o problems with right pointer finger b/c seeing other. Referred to ortho for further work up.  -10/29/19: " steroid shot helped tremendously, not as tender and good ROM. Hold off on ortho referral for now. POC: will monitor for now.   -12/3/19: improving, will continue to monitor.     Hx of abd hernia/lower abd pain-acute, worsening  -pt states four days ago she did some heavy lifting thinks she irritated her hernia she has a hx of. Describes exacerbations as positional with leaning back on pillow feeling better but standing and without constant pressure-causing her so much pain she cries. Pt states hx of lower abd hernia five years ago but it got better. Previous u/s of entire abd done on 11/4/19 was neg.   -POC: will do lab workup for hpylori to be safe and then do ct scan of abd and pelvis and send for further eval to specialist if needed.     Bruising/peripheral swelling/bruising/skin sensitivity/skin itching-acute new problems not improving.   Patient has a few new acute c/o that she mentions and wants a work up for. Pt states veins are more prominent on the left vs the right. She has itching. Bruising on legs bilaterally, states legs feel tight all over, skin is sensitive to touch. Will gets venous us and lab work up and then go from there.     Hyperglycemia with hypoglycemia-chronic, controlled with diet.  -pt needs new glucometer and refills on lancets and strips, gave pt sample glucometer contour next one, call for refills when needed    F/u pending future tests and labs.        Patient educated to follow-up sooner than next scheduled appointment if condition(s) worse or do not improve. Patient states understanding and is in agreeance with plan of care. An After Visit Summary was printed and given to the patient.      ABHISHEK Bowman        This document has been electronically signed by ABHISHEK Bowman on December 18, 2019 5:22 PM      EMR/Transcription Dragon Disclaimer:  Some of this note may be an electronic dragon transcription/translation of spoken language to printed text. The electronic  translation of spoken language may permit erroneous, or at times, nonsensical words or phrases to be inadvertently transcribed. Although I have reviewed the note for such errors, some may still exist.

## 2020-01-17 DIAGNOSIS — R10.30 LOWER ABDOMINAL PAIN: Primary | ICD-10-CM

## 2020-02-04 ENCOUNTER — OFFICE VISIT (OUTPATIENT)
Dept: GASTROENTEROLOGY | Facility: CLINIC | Age: 35
End: 2020-02-04

## 2020-02-04 VITALS
SYSTOLIC BLOOD PRESSURE: 130 MMHG | WEIGHT: 207 LBS | BODY MASS INDEX: 36.68 KG/M2 | HEIGHT: 63 IN | HEART RATE: 77 BPM | OXYGEN SATURATION: 98 % | DIASTOLIC BLOOD PRESSURE: 84 MMHG

## 2020-02-04 DIAGNOSIS — J44.9 CHRONIC OBSTRUCTIVE PULMONARY DISEASE, UNSPECIFIED COPD TYPE (HCC): ICD-10-CM

## 2020-02-04 DIAGNOSIS — R73.9 HYPERGLYCEMIA: ICD-10-CM

## 2020-02-04 DIAGNOSIS — E87.6 HYPOKALEMIA: ICD-10-CM

## 2020-02-04 DIAGNOSIS — M54.6 CHRONIC BILATERAL THORACIC BACK PAIN: ICD-10-CM

## 2020-02-04 DIAGNOSIS — J30.1 CHRONIC ALLERGIC RHINITIS DUE TO POLLEN: ICD-10-CM

## 2020-02-04 DIAGNOSIS — E53.8 VITAMIN B 12 DEFICIENCY: ICD-10-CM

## 2020-02-04 DIAGNOSIS — R10.84 GENERALIZED ABDOMINAL PAIN: ICD-10-CM

## 2020-02-04 DIAGNOSIS — R11.0 NAUSEA: ICD-10-CM

## 2020-02-04 DIAGNOSIS — E78.5 HYPERLIPIDEMIA, UNSPECIFIED HYPERLIPIDEMIA TYPE: ICD-10-CM

## 2020-02-04 DIAGNOSIS — R11.2 NAUSEA AND VOMITING, INTRACTABILITY OF VOMITING NOT SPECIFIED, UNSPECIFIED VOMITING TYPE: ICD-10-CM

## 2020-02-04 DIAGNOSIS — G89.29 CHRONIC BILATERAL LOW BACK PAIN WITHOUT SCIATICA: ICD-10-CM

## 2020-02-04 DIAGNOSIS — M54.50 CHRONIC BILATERAL LOW BACK PAIN WITHOUT SCIATICA: ICD-10-CM

## 2020-02-04 DIAGNOSIS — E53.8 VITAMIN B12 DEFICIENCY: ICD-10-CM

## 2020-02-04 DIAGNOSIS — L81.9 DISCOLORATION OF SKIN OF LOWER LEG: ICD-10-CM

## 2020-02-04 DIAGNOSIS — J40 BRONCHITIS: ICD-10-CM

## 2020-02-04 DIAGNOSIS — R19.7 DIARRHEA, UNSPECIFIED TYPE: ICD-10-CM

## 2020-02-04 DIAGNOSIS — G89.29 CHRONIC BILATERAL THORACIC BACK PAIN: ICD-10-CM

## 2020-02-04 DIAGNOSIS — L67.9: ICD-10-CM

## 2020-02-04 DIAGNOSIS — I73.9 PAD (PERIPHERAL ARTERY DISEASE) (HCC): ICD-10-CM

## 2020-02-04 DIAGNOSIS — K21.9 GASTROESOPHAGEAL REFLUX DISEASE WITHOUT ESOPHAGITIS: Primary | ICD-10-CM

## 2020-02-04 DIAGNOSIS — K58.2 IRRITABLE BOWEL SYNDROME WITH BOTH CONSTIPATION AND DIARRHEA: ICD-10-CM

## 2020-02-04 DIAGNOSIS — B35.9 RINGWORM: ICD-10-CM

## 2020-02-04 DIAGNOSIS — E55.9 VITAMIN D DEFICIENCY: ICD-10-CM

## 2020-02-04 DIAGNOSIS — E16.2 HYPOGLYCEMIA: ICD-10-CM

## 2020-02-04 DIAGNOSIS — I73.9 INTERMITTENT CLAUDICATION (HCC): Primary | ICD-10-CM

## 2020-02-04 DIAGNOSIS — R25.2 MUSCLE CRAMP: ICD-10-CM

## 2020-02-04 PROCEDURE — 99214 OFFICE O/P EST MOD 30 MIN: CPT | Performed by: PHYSICIAN ASSISTANT

## 2020-02-04 RX ORDER — CYANOCOBALAMIN 1000 UG/ML
1000 INJECTION, SOLUTION INTRAMUSCULAR; SUBCUTANEOUS
Qty: 1 ML | Refills: 5 | Status: SHIPPED | OUTPATIENT
Start: 2020-02-04 | End: 2020-07-10 | Stop reason: SDUPTHER

## 2020-02-04 RX ORDER — ALBUTEROL SULFATE 90 UG/1
2 AEROSOL, METERED RESPIRATORY (INHALATION) EVERY 6 HOURS PRN
Qty: 1 INHALER | Refills: 5 | Status: CANCELLED | OUTPATIENT
Start: 2020-02-04

## 2020-02-04 RX ORDER — CITALOPRAM 20 MG/1
20 TABLET ORAL DAILY
Qty: 30 TABLET | Refills: 5 | Status: CANCELLED | OUTPATIENT
Start: 2020-02-04

## 2020-02-04 RX ORDER — ALBUTEROL SULFATE 1.25 MG/3ML
1 SOLUTION RESPIRATORY (INHALATION) EVERY 6 HOURS PRN
Qty: 30 VIAL | Refills: 5 | Status: SHIPPED | OUTPATIENT
Start: 2020-02-04

## 2020-02-04 RX ORDER — ATORVASTATIN CALCIUM 20 MG/1
20 TABLET, FILM COATED ORAL NIGHTLY
Qty: 30 TABLET | Refills: 5 | Status: SHIPPED | OUTPATIENT
Start: 2020-02-04 | End: 2020-10-22

## 2020-02-04 RX ORDER — SPIRONOLACTONE 25 MG/1
25 TABLET ORAL DAILY
Qty: 30 TABLET | Refills: 5 | Status: SHIPPED | OUTPATIENT
Start: 2020-02-04 | End: 2020-12-03 | Stop reason: SDUPTHER

## 2020-02-04 RX ORDER — ASPIRIN 81 MG/1
81 TABLET ORAL DAILY
Qty: 30 TABLET | Refills: 5 | Status: SHIPPED | OUTPATIENT
Start: 2020-02-04 | End: 2021-02-11 | Stop reason: SDUPTHER

## 2020-02-04 RX ORDER — MIRTAZAPINE 7.5 MG/1
7.5 TABLET, FILM COATED ORAL NIGHTLY
Qty: 30 TABLET | Refills: 5 | Status: CANCELLED | OUTPATIENT
Start: 2020-02-04

## 2020-02-04 RX ORDER — CITALOPRAM 20 MG/1
20 TABLET ORAL DAILY
Qty: 30 TABLET | Refills: 5 | Status: SHIPPED | OUTPATIENT
Start: 2020-02-04 | End: 2020-12-03 | Stop reason: SDUPTHER

## 2020-02-04 RX ORDER — ONDANSETRON 4 MG/1
4 TABLET, ORALLY DISINTEGRATING ORAL EVERY 8 HOURS PRN
Qty: 30 TABLET | Refills: 2 | Status: SHIPPED | OUTPATIENT
Start: 2020-02-04 | End: 2020-07-31

## 2020-02-04 RX ORDER — LORATADINE 10 MG/1
10 TABLET ORAL DAILY
Qty: 30 TABLET | Refills: 5 | Status: SHIPPED | OUTPATIENT
Start: 2020-02-04 | End: 2020-10-22

## 2020-02-04 RX ORDER — ONDANSETRON 4 MG/1
4 TABLET, ORALLY DISINTEGRATING ORAL EVERY 8 HOURS PRN
Qty: 30 TABLET | Refills: 2 | Status: CANCELLED | OUTPATIENT
Start: 2020-02-04

## 2020-02-04 RX ORDER — MIRTAZAPINE 7.5 MG/1
7.5 TABLET, FILM COATED ORAL NIGHTLY
Qty: 30 TABLET | Refills: 5 | Status: SHIPPED | OUTPATIENT
Start: 2020-02-04 | End: 2020-12-03 | Stop reason: SDUPTHER

## 2020-02-04 RX ORDER — ALBUTEROL SULFATE 1.25 MG/3ML
1 SOLUTION RESPIRATORY (INHALATION) EVERY 6 HOURS PRN
Qty: 30 VIAL | Refills: 5 | Status: CANCELLED | OUTPATIENT
Start: 2020-02-04

## 2020-02-04 RX ORDER — POTASSIUM CHLORIDE 750 MG/1
10 TABLET, FILM COATED, EXTENDED RELEASE ORAL DAILY
Qty: 30 TABLET | Refills: 2 | Status: SHIPPED | OUTPATIENT
Start: 2020-02-04 | End: 2020-12-03 | Stop reason: SDUPTHER

## 2020-02-04 RX ORDER — SPIRONOLACTONE 25 MG/1
25 TABLET ORAL DAILY
Qty: 30 TABLET | Refills: 5 | Status: CANCELLED | OUTPATIENT
Start: 2020-02-04

## 2020-02-04 RX ORDER — LORATADINE 10 MG/1
10 TABLET ORAL DAILY
Qty: 30 TABLET | Refills: 5 | Status: CANCELLED | OUTPATIENT
Start: 2020-02-04

## 2020-02-04 RX ORDER — CYCLOBENZAPRINE HCL 10 MG
10 TABLET ORAL 3 TIMES DAILY PRN
Qty: 90 TABLET | Refills: 5 | Status: CANCELLED | OUTPATIENT
Start: 2020-02-04

## 2020-02-04 RX ORDER — ATORVASTATIN CALCIUM 20 MG/1
20 TABLET, FILM COATED ORAL NIGHTLY
Qty: 30 TABLET | Refills: 5 | Status: CANCELLED | OUTPATIENT
Start: 2020-02-04

## 2020-02-04 RX ORDER — CILOSTAZOL 100 MG/1
100 TABLET ORAL DAILY
Qty: 30 TABLET | Refills: 5 | Status: SHIPPED | OUTPATIENT
Start: 2020-02-04 | End: 2020-12-03 | Stop reason: SDUPTHER

## 2020-02-04 RX ORDER — ERGOCALCIFEROL 1.25 MG/1
50000 CAPSULE ORAL 2 TIMES WEEKLY
Qty: 10 CAPSULE | Refills: 5 | Status: CANCELLED | OUTPATIENT
Start: 2020-02-06

## 2020-02-04 RX ORDER — DEXTROSE AND SODIUM CHLORIDE 5; .45 G/100ML; G/100ML
30 INJECTION, SOLUTION INTRAVENOUS CONTINUOUS PRN
Status: CANCELLED | OUTPATIENT
Start: 2020-02-12

## 2020-02-04 RX ORDER — FLUTICASONE PROPIONATE 50 MCG
2 SPRAY, SUSPENSION (ML) NASAL DAILY
Qty: 16 G | Refills: 5 | Status: SHIPPED | OUTPATIENT
Start: 2020-02-04 | End: 2020-12-03 | Stop reason: SDUPTHER

## 2020-02-04 RX ORDER — ASPIRIN 81 MG/1
81 TABLET ORAL DAILY
Qty: 30 TABLET | Refills: 5 | Status: CANCELLED | OUTPATIENT
Start: 2020-02-04

## 2020-02-04 RX ORDER — ERGOCALCIFEROL 1.25 MG/1
50000 CAPSULE ORAL 2 TIMES WEEKLY
Qty: 10 CAPSULE | Refills: 5 | Status: SHIPPED | OUTPATIENT
Start: 2020-02-06 | End: 2020-12-03 | Stop reason: SDUPTHER

## 2020-02-04 RX ORDER — FLUTICASONE PROPIONATE 50 MCG
2 SPRAY, SUSPENSION (ML) NASAL DAILY
Qty: 16 G | Refills: 5 | Status: CANCELLED | OUTPATIENT
Start: 2020-02-04

## 2020-02-04 RX ORDER — ORPHENADRINE CITRATE, ASPIRIN AND CAFFEINE 50; 770; 60 MG/1; MG/1; MG/1
1 TABLET ORAL 3 TIMES DAILY PRN
Qty: 90 TABLET | Refills: 0 | Status: CANCELLED | OUTPATIENT
Start: 2020-02-04

## 2020-02-04 RX ORDER — ALBUTEROL SULFATE 90 UG/1
2 AEROSOL, METERED RESPIRATORY (INHALATION) EVERY 6 HOURS PRN
Qty: 1 INHALER | Refills: 5 | Status: SHIPPED | OUTPATIENT
Start: 2020-02-04 | End: 2020-12-03 | Stop reason: SDUPTHER

## 2020-02-04 RX ORDER — PSEUDOEPHEDRINE HYDROCHLORIDE 60 MG/1
60 TABLET, FILM COATED ORAL EVERY 6 HOURS PRN
Qty: 20 TABLET | Refills: 0 | Status: CANCELLED | OUTPATIENT
Start: 2020-02-04

## 2020-02-04 RX ORDER — CILOSTAZOL 100 MG/1
100 TABLET ORAL DAILY
Qty: 30 TABLET | Refills: 5 | Status: CANCELLED | OUTPATIENT
Start: 2020-02-04

## 2020-02-04 RX ORDER — CYANOCOBALAMIN 1000 UG/ML
1000 INJECTION, SOLUTION INTRAMUSCULAR; SUBCUTANEOUS
Qty: 1 ML | Refills: 5 | Status: CANCELLED | OUTPATIENT
Start: 2020-02-04

## 2020-02-04 RX ORDER — CLOTRIMAZOLE AND BETAMETHASONE DIPROPIONATE 10; .64 MG/G; MG/G
CREAM TOPICAL 2 TIMES DAILY
Qty: 15 G | Refills: 0 | Status: SHIPPED | OUTPATIENT
Start: 2020-02-04 | End: 2021-02-16

## 2020-02-04 NOTE — PROGRESS NOTES
Chief Complaint   Patient presents with   • Abdominal Pain     lower       ENDO PROCEDURE ORDERED: EGD/COLON gerd, n, abd pain, diarrhea    Subjective    Veronica Hicks is a 34 y.o. female. she is here today for follow-up.    History of Present Illness    This 34-year-old female was sent for consultation for abdominal pain by Valentina Hoffman who saw the patient with an abscess, abdominal pain, was given doxycycline on 12/03/2019.  I had previously seen the patient with nausea, vomiting, diarrhea on 11/14/2017.  She had been ordered to have laboratories and upper and lower endoscopy.  She states she could not drink the prep and never returned for a followup.  She states she is mostly having diarrhea.  She has tried prescription medications as well as some over-the-counter medications, but they do not seem to help.  She thinks she has tried Bentyl, Levbid, fiber, lomotil.  She thinks she has had a colonoscopy in the past.  Last EGD she believes was by Dr. Orozco on 06/29/2010 showed a hiatal hernia.  She does have a family history of colon cancer.  She notes she has 3/10 primarily lower abdominal pain.  No heartburn, nausea, vomiting, dysphagia.  Heavy lifting seems to make her pain worse.  If she eats corn, she gets more constipated.  Weight is down 19 pounds since last visit.  She has had no recent imaging or laboratories.    ASSESSMENT/PLAN:  Patient with high risk noncompliance with complaints of abdominal pain, diarrhea, weight loss.  Recommend EGD/colonoscopy.  We will attempt to look at her terminal ileum.  We will do biopsies.  Would consider inflammatory bowel disease.  May need to consider food allergies.  She believes she can do a MiraLax prep.  I encouraged her compliance.  We will do followup after the above, further pending clinical course and the results of the above.    Thank you very much, Valentina, for this consultation, and for allowing us to participate in the care of your patient.  We will  keep you informed.       The following portions of the patient's history were reviewed and updated as appropriate:   Past Medical History:   Diagnosis Date   • Abdominal pain    • Abnormal Pap smear of cervix    • Acute bronchitis    • Acute left otitis media    • Ankle pain    • Anxiety    • Asthma    • Attention deficit hyperactivity disorder    • Bipolar disorder (CMS/HCC)    • Candidiasis    • Candidiasis of vagina    • Depression    • Diabetes mellitus (CMS/HCC)    • Diarrhea    • Dizziness    • Dysuria    • Elbow joint pain     elbow joint - painful on movement   • Elbow joint pain    • Elevated blood pressure    • Encounter for long-term (current) use of medications     Long-term (current) use of other medications    • Epigastric pain    • Excessive thirst    • Fall    • Fatigue    • Feeling tired     tired all the time   • Flank pain    • Gastroesophageal reflux disease    • High risk sexual behavior    • History of malignant neoplasm of cervix    • Hordeolum internum right upper eyelid    • HPV (human papilloma virus) infection    • Hx of blood clots    • Hypokalemia    • Increased frequency of urination    • Infection of skin and subcutaneous tissue    • Irritable bowel syndrome    • Knee pain, left    • Low back pain    • Muscle weakness    • Nausea and vomiting    • Pain in limb    • Pain in wrist    • Peripheral arterial disease (CMS/HCC)    • Postartificial menopausal syndrome    • Postmenopausal state    • Serous otitis media    • Skin lesion    • Smokes tobacco daily    • Upper respiratory infection    • Urinary tract infection    • Vaginitis      Past Surgical History:   Procedure Laterality Date   • APPENDECTOMY     • INJECTION OF MEDICATION  04/17/2015    depo medrol 80mg   • LEG SURGERY     • OOPHORECTOMY  07/22/2015    bilatral, for pain and recurrent ovarian cysts   • ORIF ULNA/RADIUS FRACTURES      treat radius/ulna fracutre-2; no pins, fracture L fa   • PAP SMEAR  09/2012   • TOTAL ABDOMINAL  HYSTERECTOMY      w bso   • TUBAL ABDOMINAL LIGATION       Family History   Problem Relation Age of Onset   • ADD / ADHD Daughter    • Lung cancer Maternal Grandmother    • Crohn's disease Maternal Grandmother    • Cancer Paternal Grandmother    • Uterine cancer Paternal Grandmother    • Colon cancer Paternal Grandmother    • Diabetes Other    • Liver cancer Other    • Breast cancer Mother    • Ovarian cancer Mother    • Crohn's disease Mother    • Birth defects Brother    • Stroke Paternal Grandfather    • Endometrial cancer Neg Hx      OB History        2    Para   1    Term                AB   1    Living   1       SAB   1    TAB        Ectopic        Molar        Multiple        Live Births                  Allergies   Allergen Reactions   • Penicillins Anaphylaxis   • Tomato Anaphylaxis   • Azithromycin Hives   • Bactrim [Sulfamethoxazole-Trimethoprim] Hives   • Cherry Flavor Swelling   • Zoloft [Sertraline Hcl] Hives   • Erythromycin Rash   • Keflex [Cephalexin] Rash     Social History     Socioeconomic History   • Marital status: Single     Spouse name: Not on file   • Number of children: Not on file   • Years of education: Not on file   • Highest education level: Not on file   Occupational History   • Occupation: disabled   Tobacco Use   • Smoking status: Current Every Day Smoker     Types: Electronic Cigarette   • Smokeless tobacco: Never Used   • Tobacco comment: Pt states she vapes, 0 nicotine   Substance and Sexual Activity   • Alcohol use: No   • Drug use: No   • Sexual activity: Yes     Partners: Male     Birth control/protection: Surgical     Current Medications:  Prior to Admission medications    Medication Sig Start Date End Date Taking? Authorizing Provider   albuterol (ACCUNEB) 1.25 MG/3ML nebulizer solution Take 3 mL by nebulization Every 6 (Six) Hours As Needed for Wheezing. 10/29/19  Yes Gina Whittaker APRN   albuterol sulfate  (90 Base) MCG/ACT inhaler Inhale 2 puffs  Every 6 (Six) Hours As Needed for Wheezing or Shortness of Air. 9/16/19  Yes Gina Whittaker APRN   aspirin 81 MG EC tablet Take 1 tablet by mouth Daily. For PAD 9/16/19  Yes Gina Whittaker APRN   atorvastatin (LIPITOR) 20 MG tablet Take 1 tablet by mouth Every Night. 10/22/19  Yes Gina Whittaker APRN   Blood Glucose Monitoring Suppl (ONE TOUCH ULTRA MINI) W/DEVICE kit  11/15/16  Yes Provider, MD Priyank   cilostazol (PLETAL) 100 MG tablet Take 1 tablet by mouth Daily. 9/16/19  Yes Gina Whittaker APRN   citalopram (CeleXA) 20 MG tablet Take 1 tablet by mouth Daily. For anxiety 9/16/19  Yes Gina Whittaker APRN   clotrimazole-betamethasone (LOTRISONE) 1-0.05 % cream Apply  topically to the appropriate area as directed 2 (Two) Times a Day. For ringworm 10/29/19  Yes Gina Whittaker APRN   cyanocobalamin 1000 MCG/ML injection Inject 1 mL into the appropriate muscle as directed by prescriber Every 28 (Twenty-Eight) Days. 9/16/19  Yes Gina Whittaker APRN   cyclobenzaprine (FLEXERIL) 10 MG tablet Take 1 tablet by mouth 3 (Three) Times a Day As Needed for Muscle Spasms. 12/3/19  Yes Gina Whittaker APRN   diclofenac (VOLTAREN) 50 MG EC tablet Take 1 tablet by mouth 2 (Two) Times a Day. 9/16/19  Yes Gina Whittaker APRN   fluticasone (FLONASE) 50 MCG/ACT nasal spray 2 sprays into the nostril(s) as directed by provider Daily. For allergies 9/16/19  Yes Gina Whittaker APRN   glucose blood test strip Check prn for hypoglycemia (bid prn) 12/4/19  Yes Gina Whittaker APRN   glucose monitor monitoring kit 1 each Daily. 9/16/19  Yes Gina Whittaker APRN   loratadine (CLARITIN) 10 MG tablet Take 1 tablet by mouth Daily. For allergies 9/16/19  Yes Gina Whittaker APRN   mirtazapine (REMERON) 7.5 MG tablet Take 1 tablet by mouth Every Night. 11/8/19  Yes Gina Whittaker APRN   ondansetron ODT (ZOFRAN ODT) 4 MG disintegrating tablet Take 1 tablet by mouth Every 8 (Eight)  "Hours As Needed for Nausea or Vomiting. 9/16/19  Yes Gina Whittaker APRN   ONE TOUCH LANCETS misc Check prn for hypoglycemia (bid prn) 12/4/19  Yes Gina Whittaker APRN   orphenadrine-aspirin-caffeine (NORGESIC- DS) -60 MG per tablet Take 1 tablet by mouth 3 (Three) Times a Day As Needed for Muscle Spasms. 10/29/19  Yes Gina Whittaker APRN   potassium chloride (K-DUR) 10 MEQ CR tablet Take 1 tablet by mouth Daily. 12/12/19  Yes Gina Whittaker APRN   promethazine-dextromethorphan (PROMETHAZINE-DM) 6.25-15 MG/5ML syrup Take 5 mL by mouth At Night As Needed (cough and nausea). Do not drive with this medication 11/8/19  Yes Gina Whittaker APRN   pseudoephedrine (SUDAFED) 60 MG tablet Take 1 tablet by mouth Every 6 (Six) Hours As Needed for Congestion. 11/8/19  Yes Gina Whittaker APRN   spironolactone (ALDACTONE) 25 MG tablet Take 1 tablet by mouth Daily. For swelling/hair growth, stop hydrochlorathiazide 9/16/19  Yes Gina Whittaker APRN   Syringe/Needle, Disp, 22G X 1-1/2\" 3 ML misc 1 syringe by Other route Every 30 (Thirty) Days. 9/16/19  Yes Gina Whittaker APRN   vitamin D (ERGOCALCIFEROL) 1.25 MG (72073 UT) capsule capsule Take 1 capsule by mouth 2 (Two) Times a Week. For vit d deficiency 10/31/19  Yes Gina Whittaker APRN   dicyclomine (BENTYL) 10 MG capsule Take 1 capsule by mouth 3 (Three) Times a Day As Needed (diarrhea/abd cramping). 12/12/19   Gina Whittaker APRN   chlorhexidine (PERIDEX) 0.12 % solution Apply 15 mL to the mouth or throat 2 (Two) Times a Day As Needed (for mouth infection until resolved). 12/4/19 2/4/20  Gina Whittaker APRN   NIFEdipine XL (NIFEDICAL XL) 30 MG 24 hr tablet Take 1 tablet by mouth Daily. 11/8/19 2/4/20  Gina Whittaker APRN     Review of Systems  Review of Systems   Constitutional: Negative for unexpected weight change.   HENT: Negative for trouble swallowing.    Gastrointestinal: Positive for abdominal distention, " "abdominal pain, constipation, diarrhea and nausea. Negative for anal bleeding, blood in stool and rectal pain.   All other systems reviewed and are negative.         Objective    /84 (BP Location: Left arm, Patient Position: Sitting)   Pulse 77   Ht 160 cm (63\")   Wt 93.9 kg (207 lb)   LMP  (LMP Unknown) Comment: w BSO  SpO2 98%   BMI 36.67 kg/m²   Physical Exam   Constitutional: She is oriented to person, place, and time. She appears well-developed and well-nourished. No distress.   HENT:   Head: Normocephalic and atraumatic.   Eyes: Pupils are equal, round, and reactive to light. EOM are normal.   Neck: Normal range of motion.   Cardiovascular: Normal rate, regular rhythm and normal heart sounds.   Pulmonary/Chest: Effort normal and breath sounds normal.   Abdominal: Soft. Bowel sounds are normal. She exhibits no shifting dullness, no distension, no abdominal bruit, no ascites and no mass. There is no hepatosplenomegaly. There is tenderness. There is no rigidity, no rebound, no guarding and no CVA tenderness. No hernia. Hernia confirmed negative in the ventral area.   Obese, mild diffuse   Musculoskeletal: Normal range of motion.   Neurological: She is alert and oriented to person, place, and time.   Skin: Skin is warm and dry.   Psychiatric: She has a normal mood and affect. Her behavior is normal. Judgment and thought content normal.   Nursing note and vitals reviewed.    Assessment/Plan      1. Gastroesophageal reflux disease without esophagitis    2. Irritable bowel syndrome with both constipation and diarrhea    3. Nausea and vomiting, intractability of vomiting not specified, unspecified vomiting type    4. Generalized abdominal pain    5. Diarrhea, unspecified type    .   Veronica was seen today for abdominal pain.    Diagnoses and all orders for this visit:    Gastroesophageal reflux disease without esophagitis  -     Case Request; Standing  -     Case Request    Irritable bowel syndrome with " both constipation and diarrhea  -     Case Request; Standing  -     Case Request    Nausea and vomiting, intractability of vomiting not specified, unspecified vomiting type  -     Case Request; Standing  -     Case Request    Generalized abdominal pain  -     Case Request; Standing  -     Case Request    Diarrhea, unspecified type  -     Case Request; Standing  -     Case Request    Other orders  -     Follow Anesthesia Guidelines / Standing Orders; Future  -     Obtain Informed Consent; Future        Orders placed during this encounter include:  Orders Placed This Encounter   Procedures   • Follow Anesthesia Guidelines / Standing Orders     Standing Status:   Future   • Obtain Informed Consent     Standing Status:   Future     Order Specific Question:   Informed Consent Given For     Answer:   ESOPHAGOGASTRODUODENOSCOPY and colonoscopy       Medications prescribed:  No orders of the defined types were placed in this encounter.    Discontinued Medications       Reason for Discontinue     NIFEdipine XL (NIFEDICAL XL) 30 MG 24 hr tablet    *Therapy completed     chlorhexidine (PERIDEX) 0.12 % solution    *Therapy completed        Requested Prescriptions      No prescriptions requested or ordered in this encounter       Review and/or summary of lab tests, radiology, procedures, medications. Review and summary of old records and obtaining of history. The risks and benefits of my recommendations, as well as other treatment options were discussed with the patient today. Questions were answered.    Follow-up: Return if symptoms worsen or fail to improve, for After the above.     ESOPHAGOGASTRODUODENOSCOPY (N/A), COLONOSCOPY (N/A)      This document has been electronically signed by Ousmane Murphy PA-C on February 10, 2020 4:43 PM      Results for orders placed or performed in visit on 10/21/19   Vitamin D 25 Hydroxy   Result Value Ref Range    25 Hydroxy, Vitamin D 26.5 (L) 30.0 - 100.0 ng/ml   TSH   Result Value Ref  Range    TSH 3.100 0.270 - 4.200 uIU/mL   T4, Free   Result Value Ref Range    Free T4 1.14 0.93 - 1.70 ng/dL   Lipid Panel   Result Value Ref Range    Total Cholesterol 218 (H) 150 - 200 mg/dL    Triglycerides 83 <=150 mg/dL    HDL Cholesterol 59 40 - 59 mg/dL    LDL Cholesterol  142 (H) <=100 mg/dL    VLDL Cholesterol 16.6 mg/dL    LDL/HDL Ratio 2.41 0.00 - 3.22   Results for orders placed or performed in visit on 09/19/19   Insulin, Free & Total, Serum   Result Value Ref Range    Insulin, Free 9.5 uU/mL    Insulin 9.8 uU/mL   Testosterone, Free, Total   Result Value Ref Range    Testosterone, Total 28 8 - 48 ng/dL    Testosterone, Free 2.5 0.0 - 4.2 pg/mL   Results for orders placed or performed in visit on 09/12/19   CBC Auto Differential   Result Value Ref Range    WBC 8.91 3.40 - 10.80 10*3/mm3    RBC 5.33 (H) 3.77 - 5.28 10*6/mm3    Hemoglobin 14.6 12.0 - 15.9 g/dL    Hematocrit 44.2 34.0 - 46.6 %    MCV 82.9 79.0 - 97.0 fL    MCH 27.4 26.6 - 33.0 pg    MCHC 33.0 31.5 - 35.7 g/dL    RDW 14.1 12.3 - 15.4 %    RDW-SD 42.1 37.0 - 54.0 fl    MPV 9.0 6.0 - 12.0 fL    Platelets 288 140 - 450 10*3/mm3    Neutrophil % 54.7 42.7 - 76.0 %    Lymphocyte % 32.8 19.6 - 45.3 %    Monocyte % 7.6 5.0 - 12.0 %    Eosinophil % 4.6 0.3 - 6.2 %    Basophil % 0.3 0.0 - 1.5 %    Neutrophils, Absolute 4.87 1.70 - 7.00 10*3/mm3    Lymphocytes, Absolute 2.92 0.70 - 3.10 10*3/mm3    Monocytes, Absolute 0.68 0.10 - 0.90 10*3/mm3    Eosinophils, Absolute 0.41 (H) 0.00 - 0.40 10*3/mm3    Basophils, Absolute 0.03 0.00 - 0.20 10*3/mm3   Vitamin D 25 Hydroxy   Result Value Ref Range    25 Hydroxy, Vitamin D 15.5 (L) 30.0 - 100.0 ng/ml   TSH   Result Value Ref Range    TSH 4.670 (H) 0.270 - 4.200 uIU/mL   T4, Free   Result Value Ref Range    Free T4 1.29 0.93 - 1.70 ng/dL   Hemoglobin A1c   Result Value Ref Range    Hemoglobin A1C 5.30 4.80 - 5.60 %   Vitamin B12   Result Value Ref Range    Vitamin B-12 393 211 - 946 pg/mL   Lipid Panel      Result Value Ref Range    Total Cholesterol 197 150 - 200 mg/dL    Triglycerides 133 <=150 mg/dL    HDL Cholesterol 44 40 - 59 mg/dL    LDL Cholesterol  126 (H) <=100 mg/dL    VLDL Cholesterol 26.6 mg/dL    LDL/HDL Ratio 2.87 0.00 - 3.22   Comprehensive Metabolic Panel   Result Value Ref Range    Glucose 93 70 - 99 mg/dL    BUN 15 7 - 23 mg/dL    Creatinine 0.87 0.52 - 1.04 mg/dL    Sodium 143 137 - 145 mmol/L    Potassium 4.0 3.4 - 5.0 mmol/L    Chloride 106 101 - 112 mmol/L    CO2 28.0 22.0 - 30.0 mmol/L    Calcium 9.6 8.4 - 10.2 mg/dL    Total Protein 7.3 6.3 - 8.6 g/dL    Albumin 4.30 3.50 - 5.00 g/dL    ALT (SGPT) 49 (H) <=35 U/L    AST (SGOT) 34 14 - 36 U/L    Alkaline Phosphatase 74 38 - 126 U/L    Total Bilirubin 0.5 0.2 - 1.3 mg/dL    eGFR Non  Amer 76 64 - 149 mL/min/1.73    Globulin 3.0 2.3 - 3.5 gm/dL    A/G Ratio 1.4 1.1 - 1.8 g/dL    BUN/Creatinine Ratio 17.2 7.0 - 25.0    Anion Gap 9.0 5.0 - 15.0 mmol/L   Results for orders placed or performed in visit on 03/11/19   CBC Auto Differential   Result Value Ref Range    WBC 8.33 3.20 - 9.80 10*3/mm3    RBC 5.12 3.77 - 5.16 10*6/mm3    Hemoglobin 13.4 12.0 - 15.5 g/dL    Hematocrit 42.5 35.0 - 45.0 %    MCV 83.0 80.0 - 98.0 fL    MCH 26.2 (L) 26.5 - 34.0 pg    MCHC 31.5 31.4 - 36.0 g/dL    RDW 14.2 11.5 - 14.5 %    RDW-SD 41.9 36.4 - 46.3 fl    MPV 9.5 8.0 - 12.0 fL    Platelets 283 150 - 450 10*3/mm3    Neutrophil % 53.0 37.0 - 80.0 %    Lymphocyte % 33.4 10.0 - 50.0 %    Monocyte % 6.6 0.0 - 12.0 %    Eosinophil % 6.5 0.0 - 7.0 %    Basophil % 0.5 0.0 - 2.0 %    Neutrophils, Absolute 4.42 2.00 - 8.60 10*3/mm3    Lymphocytes, Absolute 2.78 0.60 - 4.20 10*3/mm3    Monocytes, Absolute 0.55 0.00 - 0.90 10*3/mm3    Eosinophils, Absolute 0.54 0.00 - 0.70 10*3/mm3    Basophils, Absolute 0.04 0.00 - 0.20 10*3/mm3   Vitamin D 25 Hydroxy   Result Value Ref Range    25 Hydroxy, Vitamin D 21.2 (L) 30.0 - 100.0 ng/ml   TSH   Result Value Ref Range    TSH  2.800 0.460 - 4.680 mIU/mL   T4, Free   Result Value Ref Range    Free T4 1.05 0.78 - 2.19 ng/dL   Hemoglobin A1c   Result Value Ref Range    Hemoglobin A1C 5.4 4 - 5.6 %   Vitamin B12   Result Value Ref Range    Vitamin B-12 447 239 - 931 pg/mL   Lipid Panel   Result Value Ref Range    Total Cholesterol 159 150 - 200 mg/dL    Triglycerides 195 (H) <=150 mg/dL    HDL Cholesterol 44 40 - 59 mg/dL    LDL Cholesterol  76 <=100 mg/dL    VLDL Cholesterol 39 mg/dL    LDL/HDL Ratio 1.73 0.00 - 3.22   Comprehensive Metabolic Panel   Result Value Ref Range    Glucose 98 74 - 99 mg/dL    BUN 9 7 - 17 mg/dL    Creatinine 0.89 0.52 - 1.04 mg/dL    Sodium 142 137 - 145 mmol/L    Potassium 4.0 3.4 - 5.0 mmol/L    Chloride 106 98 - 107 mmol/L    CO2 29.0 22.0 - 30.0 mmol/L    Calcium 9.4 8.4 - 10.2 mg/dL    Total Protein 7.4 6.3 - 8.2 g/dL    Albumin 4.40 3.50 - 5.00 g/dL    ALT (SGPT) 46 (H) <=35 U/L    AST (SGOT) 34 14 - 36 U/L    Alkaline Phosphatase 72 38 - 126 U/L    Total Bilirubin 0.4 0.2 - 1.3 mg/dL    eGFR Non  Amer 74 64 - 149 mL/min/1.73    Globulin 3.0 2.3 - 3.5 gm/dL    A/G Ratio 1.5 1.1 - 1.8 g/dL    BUN/Creatinine Ratio 10.1 7.0 - 25.0    Anion Gap 7.0 5.0 - 15.0 mmol/L     *Note: Due to a large number of results and/or encounters for the requested time period, some results have not been displayed. A complete set of results can be found in Results Review.       Some portions of this note have been dictated using voice recognition software and may contain errors and/or omissions.

## 2020-02-04 NOTE — PATIENT INSTRUCTIONS

## 2020-02-28 DIAGNOSIS — E16.2 HYPOGLYCEMIA: ICD-10-CM

## 2020-02-28 DIAGNOSIS — R73.9 HYPERGLYCEMIA: ICD-10-CM

## 2020-03-11 DIAGNOSIS — K64.4 EXTERNAL HEMORRHOID: Primary | ICD-10-CM

## 2020-03-11 RX ORDER — DIAPER,BRIEF,INFANT-TODD,DISP
EACH MISCELLANEOUS 2 TIMES DAILY
Qty: 1.5 G | Refills: 0 | Status: SHIPPED | OUTPATIENT
Start: 2020-03-11 | End: 2020-11-06 | Stop reason: SDUPTHER

## 2020-04-14 ENCOUNTER — TELEMEDICINE (OUTPATIENT)
Dept: FAMILY MEDICINE CLINIC | Facility: CLINIC | Age: 35
End: 2020-04-14

## 2020-04-14 DIAGNOSIS — E55.9 VITAMIN D DEFICIENCY: ICD-10-CM

## 2020-04-14 DIAGNOSIS — E53.8 LOW SERUM VITAMIN B12: ICD-10-CM

## 2020-04-14 DIAGNOSIS — G89.29 CHRONIC BILATERAL LOW BACK PAIN WITH BILATERAL SCIATICA: ICD-10-CM

## 2020-04-14 DIAGNOSIS — R60.9 PERIPHERAL EDEMA: ICD-10-CM

## 2020-04-14 DIAGNOSIS — R73.9 HYPERGLYCEMIA: ICD-10-CM

## 2020-04-14 DIAGNOSIS — Z72.0 SMOKELESS TOBACCO USE: ICD-10-CM

## 2020-04-14 DIAGNOSIS — F31.32 BIPOLAR AFFECTIVE DISORDER, CURRENTLY DEPRESSED, MODERATE (HCC): ICD-10-CM

## 2020-04-14 DIAGNOSIS — I73.9 PAD (PERIPHERAL ARTERY DISEASE) (HCC): ICD-10-CM

## 2020-04-14 DIAGNOSIS — Z91.018 TOMATO ALLERGY: ICD-10-CM

## 2020-04-14 DIAGNOSIS — K58.2 IRRITABLE BOWEL SYNDROME WITH BOTH CONSTIPATION AND DIARRHEA: Chronic | ICD-10-CM

## 2020-04-14 DIAGNOSIS — Z91.018 ALLERGY TO ALPHA-GAL: ICD-10-CM

## 2020-04-14 DIAGNOSIS — J45.20 MILD INTERMITTENT ASTHMA WITHOUT COMPLICATION: Chronic | ICD-10-CM

## 2020-04-14 DIAGNOSIS — Z86.59 HISTORY OF POSTTRAUMATIC STRESS DISORDER (PTSD): ICD-10-CM

## 2020-04-14 DIAGNOSIS — M25.562 CHRONIC PAIN OF LEFT KNEE: ICD-10-CM

## 2020-04-14 DIAGNOSIS — M54.12 CERVICAL RADICULOPATHY: ICD-10-CM

## 2020-04-14 DIAGNOSIS — M54.42 CHRONIC BILATERAL LOW BACK PAIN WITH BILATERAL SCIATICA: ICD-10-CM

## 2020-04-14 DIAGNOSIS — R20.2 PARESTHESIA OF LEFT UPPER AND LOWER EXTREMITY: ICD-10-CM

## 2020-04-14 DIAGNOSIS — R26.81 UNSTABLE GAIT: ICD-10-CM

## 2020-04-14 DIAGNOSIS — G47.00 INSOMNIA, UNSPECIFIED TYPE: ICD-10-CM

## 2020-04-14 DIAGNOSIS — G89.29 CHRONIC PAIN OF LEFT KNEE: ICD-10-CM

## 2020-04-14 DIAGNOSIS — Z87.891 EX-SMOKER: ICD-10-CM

## 2020-04-14 DIAGNOSIS — M54.41 CHRONIC BILATERAL LOW BACK PAIN WITH BILATERAL SCIATICA: ICD-10-CM

## 2020-04-14 DIAGNOSIS — R56.9 SEIZURE-LIKE ACTIVITY (HCC): ICD-10-CM

## 2020-04-14 DIAGNOSIS — I73.00 RAYNAUD'S DISEASE WITHOUT GANGRENE: Primary | ICD-10-CM

## 2020-04-14 DIAGNOSIS — G89.29 CHRONIC BILATERAL THORACIC BACK PAIN: ICD-10-CM

## 2020-04-14 DIAGNOSIS — M54.6 CHRONIC BILATERAL THORACIC BACK PAIN: ICD-10-CM

## 2020-04-14 DIAGNOSIS — R11.2 NAUSEA AND VOMITING, INTRACTABILITY OF VOMITING NOT SPECIFIED, UNSPECIFIED VOMITING TYPE: ICD-10-CM

## 2020-04-14 DIAGNOSIS — G62.9 PERIPHERAL POLYNEUROPATHY: ICD-10-CM

## 2020-04-14 DIAGNOSIS — E16.2 HYPOGLYCEMIA: Chronic | ICD-10-CM

## 2020-04-14 DIAGNOSIS — F33.1 MODERATE EPISODE OF RECURRENT MAJOR DEPRESSIVE DISORDER (HCC): ICD-10-CM

## 2020-04-14 DIAGNOSIS — R20.2 PARESTHESIA OF RIGHT UPPER AND LOWER EXTREMITY: ICD-10-CM

## 2020-04-14 DIAGNOSIS — Z99.89 USE OF CANE AS AMBULATORY AID: ICD-10-CM

## 2020-04-14 DIAGNOSIS — K21.9 GASTROESOPHAGEAL REFLUX DISEASE WITHOUT ESOPHAGITIS: ICD-10-CM

## 2020-04-14 PROCEDURE — 99213 OFFICE O/P EST LOW 20 MIN: CPT | Performed by: NURSE PRACTITIONER

## 2020-04-15 ENCOUNTER — LAB (OUTPATIENT)
Dept: LAB | Facility: OTHER | Age: 35
End: 2020-04-15

## 2020-04-15 DIAGNOSIS — Z13.0 SCREENING FOR DEFICIENCY ANEMIA: ICD-10-CM
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
## 2020-04-15 DIAGNOSIS — E78.5 HYPERLIPIDEMIA, UNSPECIFIED HYPERLIPIDEMIA TYPE: ICD-10-CM

## 2020-04-15 DIAGNOSIS — R23.3 BRUISES EASILY: ICD-10-CM

## 2020-04-15 DIAGNOSIS — E55.9 VITAMIN D DEFICIENCY: ICD-10-CM

## 2020-04-15 DIAGNOSIS — R22.40 LOCALIZED SWELLING OF LOWER LEG: ICD-10-CM

## 2020-04-15 DIAGNOSIS — R34 URINE OUTPUT LOW: ICD-10-CM

## 2020-04-15 DIAGNOSIS — R73.9 HYPERGLYCEMIA: ICD-10-CM

## 2020-04-15 DIAGNOSIS — E53.8 VITAMIN B 12 DEFICIENCY: ICD-10-CM

## 2020-04-15 DIAGNOSIS — R23.8 SKIN SENSITIVITY: ICD-10-CM

## 2020-04-15 DIAGNOSIS — R79.89 ELEVATED TSH: ICD-10-CM

## 2020-04-15 LAB
ALBUMIN SERPL-MCNC: 4.1 G/DL (ref 3.5–5)
ALBUMIN/GLOB SERPL: 1.1 G/DL (ref 1.1–1.8)
ALP SERPL-CCNC: 76 U/L (ref 38–126)
ALT SERPL W P-5'-P-CCNC: 32 U/L
ANION GAP SERPL CALCULATED.3IONS-SCNC: 9 MMOL/L (ref 5–15)
AST SERPL-CCNC: 26 U/L (ref 14–36)
BASOPHILS # BLD AUTO: 0.04 10*3/MM3 (ref 0–0.2)
BASOPHILS NFR BLD AUTO: 0.6 % (ref 0–1.5)
BILIRUB SERPL-MCNC: 0.3 MG/DL (ref 0.2–1.3)
BILIRUB UR QL STRIP: NEGATIVE
BNP SERPL-MCNC: <5 PG/ML (ref 0–100)
BUN BLD-MCNC: 12 MG/DL (ref 7–23)
BUN/CREAT SERPL: 15.4 (ref 7–25)
CALCIUM SPEC-SCNC: 9.6 MG/DL (ref 8.4–10.2)
CHLORIDE SERPL-SCNC: 104 MMOL/L (ref 101–112)
CHOLEST SERPL-MCNC: 202 MG/DL (ref 150–200)
CLARITY UR: ABNORMAL
CO2 SERPL-SCNC: 28 MMOL/L (ref 22–30)
COLOR UR: YELLOW
CREAT BLD-MCNC: 0.78 MG/DL (ref 0.52–1.04)
DEPRECATED RDW RBC AUTO: 40.5 FL (ref 37–54)
EOSINOPHIL # BLD AUTO: 0.19 10*3/MM3 (ref 0–0.4)
EOSINOPHIL NFR BLD AUTO: 2.7 % (ref 0.3–6.2)
ERYTHROCYTE [DISTWIDTH] IN BLOOD BY AUTOMATED COUNT: 13.9 % (ref 12.3–15.4)
GFR SERPL CREATININE-BSD FRML MDRD: 86 ML/MIN/1.73 (ref 64–149)
GLOBULIN UR ELPH-MCNC: 3.6 GM/DL (ref 2.3–3.5)
GLUCOSE BLD-MCNC: 114 MG/DL (ref 70–99)
GLUCOSE UR STRIP-MCNC: NEGATIVE MG/DL
HCT VFR BLD AUTO: 43.8 % (ref 34–46.6)
HDLC SERPL-MCNC: 44 MG/DL (ref 40–59)
HGB BLD-MCNC: 14.3 G/DL (ref 12–15.9)
HGB UR QL STRIP.AUTO: NEGATIVE
INR PPP: 0.99 (ref 0.8–1.2)
KETONES UR QL STRIP: NEGATIVE
LDLC SERPL CALC-MCNC: 114 MG/DL
LDLC/HDLC SERPL: 2.59 {RATIO} (ref 0–3.22)
LEUKOCYTE ESTERASE UR QL STRIP.AUTO: NEGATIVE
LYMPHOCYTES # BLD AUTO: 2.45 10*3/MM3 (ref 0.7–3.1)
LYMPHOCYTES NFR BLD AUTO: 34.5 % (ref 19.6–45.3)
MAGNESIUM SERPL-MCNC: 2.2 MG/DL (ref 1.6–2.3)
MCH RBC QN AUTO: 26.5 PG (ref 26.6–33)
MCHC RBC AUTO-ENTMCNC: 32.6 G/DL (ref 31.5–35.7)
MCV RBC AUTO: 81.1 FL (ref 79–97)
MONOCYTES # BLD AUTO: 0.64 10*3/MM3 (ref 0.1–0.9)
MONOCYTES NFR BLD AUTO: 9 % (ref 5–12)
NEUTROPHILS # BLD AUTO: 3.78 10*3/MM3 (ref 1.7–7)
NEUTROPHILS NFR BLD AUTO: 53.2 % (ref 42.7–76)
NITRITE UR QL STRIP: NEGATIVE
PH UR STRIP.AUTO: 5.5 [PH] (ref 5.5–8)
PLATELET # BLD AUTO: 261 10*3/MM3 (ref 140–450)
PMV BLD AUTO: 9.1 FL (ref 6–12)
POTASSIUM BLD-SCNC: 4.1 MMOL/L (ref 3.4–5)
PROT SERPL-MCNC: 7.7 G/DL (ref 6.3–8.6)
PROT UR QL STRIP: NEGATIVE
PROTHROMBIN TIME: 12.8 SECONDS (ref 11.1–15.3)
RBC # BLD AUTO: 5.4 10*6/MM3 (ref 3.77–5.28)
SODIUM BLD-SCNC: 141 MMOL/L (ref 137–145)
SP GR UR STRIP: >=1.03 (ref 1–1.03)
TRIGL SERPL-MCNC: 220 MG/DL
UROBILINOGEN UR QL STRIP: ABNORMAL
VLDLC SERPL-MCNC: 44 MG/DL
WBC NRBC COR # BLD: 7.1 10*3/MM3 (ref 3.4–10.8)

## 2020-04-15 PROCEDURE — 86225 DNA ANTIBODY NATIVE: CPT | Performed by: NURSE PRACTITIONER

## 2020-04-15 PROCEDURE — 86235 NUCLEAR ANTIGEN ANTIBODY: CPT | Performed by: NURSE PRACTITIONER

## 2020-04-15 PROCEDURE — 83880 ASSAY OF NATRIURETIC PEPTIDE: CPT | Performed by: NURSE PRACTITIONER

## 2020-04-15 PROCEDURE — 84439 ASSAY OF FREE THYROXINE: CPT | Performed by: NURSE PRACTITIONER

## 2020-04-15 PROCEDURE — 82607 VITAMIN B-12: CPT | Performed by: NURSE PRACTITIONER

## 2020-04-15 PROCEDURE — 81003 URINALYSIS AUTO W/O SCOPE: CPT | Performed by: NURSE PRACTITIONER

## 2020-04-15 PROCEDURE — 83735 ASSAY OF MAGNESIUM: CPT | Performed by: NURSE PRACTITIONER

## 2020-04-15 PROCEDURE — 83036 HEMOGLOBIN GLYCOSYLATED A1C: CPT | Performed by: NURSE PRACTITIONER

## 2020-04-15 PROCEDURE — 83516 IMMUNOASSAY NONANTIBODY: CPT | Performed by: NURSE PRACTITIONER

## 2020-04-15 PROCEDURE — 80061 LIPID PANEL: CPT | Performed by: NURSE PRACTITIONER

## 2020-04-15 PROCEDURE — 80050 GENERAL HEALTH PANEL: CPT | Performed by: NURSE PRACTITIONER

## 2020-04-15 PROCEDURE — 82306 VITAMIN D 25 HYDROXY: CPT | Performed by: NURSE PRACTITIONER

## 2020-04-15 PROCEDURE — 85610 PROTHROMBIN TIME: CPT | Performed by: NURSE PRACTITIONER
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
## 2020-04-16 DIAGNOSIS — R73.9 HYPERGLYCEMIA: Primary | ICD-10-CM

## 2020-04-16 LAB
25(OH)D3 SERPL-MCNC: 23.6 NG/ML (ref 30–100)
CENTROMERE B AB SER-ACNC: <0.2 AI (ref 0–0.9)
CHROMATIN AB SERPL-ACNC: <0.2 AI (ref 0–0.9)
DSDNA AB SER-ACNC: <1 IU/ML (ref 0–9)
ENA JO1 AB SER-ACNC: <0.2 AI (ref 0–0.9)
ENA RNP AB SER-ACNC: <0.2 AI (ref 0–0.9)
ENA SCL70 AB SER-ACNC: <0.2 AI (ref 0–0.9)
ENA SM AB SER-ACNC: <0.2 AI (ref 0–0.9)
ENA SS-A AB SER-ACNC: <0.2 AI (ref 0–0.9)
ENA SS-B AB SER-ACNC: <0.2 AI (ref 0–0.9)
Lab: NORMAL
SPECIMEN STATUS: NORMAL
T4 FREE SERPL-MCNC: 1.12 NG/DL (ref 0.93–1.7)
TSH SERPL DL<=0.05 MIU/L-ACNC: 5.86 UIU/ML (ref 0.27–4.2)
VIT B12 BLD-MCNC: 455 PG/ML (ref 211–946)

## 2020-04-16 NOTE — PROGRESS NOTES
Can wait to call til I get the other labs back if you want.    bnp looking for heart issues with circulating blood is negative.   Pt/inr negative, looking for issues with blood coagulation.  Magnesium levels good.   UA shows some cloudiness other negative.  cmp shows liver, kidney and electrolytes good but fasting blood glucose is elevated so I added an a1c to make sure she was not at prediabetic level, it is not back yet.  Cholesterol levels are improving as far as ldls go which is great, we still want this under 100 so we need to keep monitoring it.  Cbc slightly elevated rbc count and slightly low mch, this is most likely due to her vaping, we will monitor as we have in the past but nothing of concern unless it goes up a couple of points and/or the hgb and hct elevated as well.  Sherita panel is negative for everything. I am not sure who diagnosed her with lupus, but I don't think that is what she has. I think when all this calms down she needs to see rheumatology for a further work up. This is what I suggest that we put down her having raynauds and discuss that is associated with other odd autoimmune symptoms and since I cannot get records from 10 years ago, I think we need to take those precautions because until she has proper work up. I am including some information below on this to explain further...she can view it in [employer].      -going to wait to finish her letter completely til I get her alpha gal results and a1c back, also waiting on thyroid and vit b12 and vit d.       Information on Raynauds (I think yours is secondary and you have just never had a proper work up on the causes).  ·Primary Raynaud phenomenon is not associated with signs or symptoms of any underlying disease, is more common in females, and accounts for 80%-90% of patients with Raynaud phenomenon.  ·Secondary Raynaud phenomenon is associated with an underlying disease, most commonly systemic sclerosis, another rheumatologic disease, a  hematologic disorder, or occlusive arterial disease.

## 2020-04-17 LAB — HBA1C MFR BLD: 5.4 % (ref 4.8–5.6)

## 2020-04-20 LAB — ALPHA GAL IGE: 1.01 KU/L

## 2020-04-22 ENCOUNTER — TELEPHONE (OUTPATIENT)
Dept: FAMILY MEDICINE CLINIC | Facility: CLINIC | Age: 35
End: 2020-04-22

## 2020-04-22 NOTE — TELEPHONE ENCOUNTER
Before I could call pt with results, she messaged Gina on [facility]. Tried calling today to see if/where she wanted Epipen sent to, no answer. Left voicemail to return call.

## 2020-04-27 DIAGNOSIS — E88.09 ALPHA GALACTOSIDASE DEFICIENCY: Primary | ICD-10-CM

## 2020-04-27 DIAGNOSIS — Z91.018 ALLERGY TO CHICKEN MEAT: ICD-10-CM

## 2020-04-27 DIAGNOSIS — H66.003 NON-RECURRENT ACUTE SUPPURATIVE OTITIS MEDIA OF BOTH EARS WITHOUT SPONTANEOUS RUPTURE OF TYMPANIC MEMBRANES: ICD-10-CM

## 2020-04-27 RX ORDER — DOXYCYCLINE 100 MG/1
100 TABLET ORAL 2 TIMES DAILY
Qty: 20 TABLET | Refills: 0 | Status: SHIPPED | OUTPATIENT
Start: 2020-04-27 | End: 2020-05-07

## 2020-04-27 RX ORDER — EPINEPHRINE 0.3 MG/.3ML
0.3 INJECTION SUBCUTANEOUS ONCE
Qty: 2 EACH | Refills: 3 | Status: SHIPPED | OUTPATIENT
Start: 2020-04-27 | End: 2020-04-27

## 2020-04-29 ENCOUNTER — LAB (OUTPATIENT)
Dept: LAB | Facility: OTHER | Age: 35
End: 2020-04-29

## 2020-04-29 DIAGNOSIS — Z91.018 ALLERGY TO CHICKEN MEAT: ICD-10-CM

## 2020-04-29 DIAGNOSIS — E88.09 ALPHA GALACTOSIDASE DEFICIENCY: ICD-10-CM

## 2020-04-29 PROCEDURE — 86003 ALLG SPEC IGE CRUDE XTRC EA: CPT | Performed by: NURSE PRACTITIONER

## 2020-04-29 PROCEDURE — 36415 COLL VENOUS BLD VENIPUNCTURE: CPT | Performed by: NURSE PRACTITIONER

## 2020-04-29 PROCEDURE — 86618 LYME DISEASE ANTIBODY: CPT | Performed by: NURSE PRACTITIONER

## 2020-05-01 LAB — B BURGDOR IGG+IGM SER-ACNC: <0.91 ISR (ref 0–0.9)

## 2020-05-02 LAB — CHICKEN FEATHER IGE QN: <0.1 KU/L

## 2020-05-03 LAB
BARLEY IGE QN: 0.14 KU/L
BEEF IGE QN: 1.95 KU/L
CABBAGE IGE QN: 0.21 KU/L
CARROT IGE QN: 0.12 KU/L
CHICKEN MEAT IGE QN: <0.1 KU/L
CODFISH IGE QN: <0.1 KU/L
CONV CLASS DESCRIPTION: ABNORMAL
CORN IGE QN: 0.14 KU/L
COW MILK IGE QN: 0.32 KU/L
CRAB IGE QN: <0.1 KU/L
EGG WHITE IGE QN: <0.1 KU/L
GRAPE IGE QN: <0.1 KU/L
GREEN PEPPER IGE QN: <0.1 KU/L
LETTUCE IGE QN: 0.13 KU/L
OAT IGE QN: 0.11 KU/L
ORANGE IGE QN: 0.12 KU/L
PEANUT IGE QN: 0.21 KU/L
PORK IGE QN: 1.14 KU/L
POTATO IGE QN: 0.17 KU/L
RICE IGE QN: 0.16 KU/L
RYE IGE: 0.16 KU/L
SHRIMP IGE: <0.1 KU/L
SOYBEAN IGE QN: 0.12 KU/L
TOMATO IGE QN: 0.16 KU/L
TUNA IGE QN: <0.1 KU/L
WHEAT IGE QN: 0.18 KU/L
WHITE BEAN IGE QN: 0.15 KU/L

## 2020-05-17 PROBLEM — M54.41 CHRONIC BILATERAL LOW BACK PAIN WITH BILATERAL SCIATICA: Status: ACTIVE | Noted: 2020-05-17

## 2020-05-17 PROBLEM — R10.84 GENERALIZED ABDOMINAL PAIN: Status: RESOLVED | Noted: 2019-11-17 | Resolved: 2020-05-17

## 2020-05-17 PROBLEM — G56.01 RIGHT CARPAL TUNNEL SYNDROME: Status: RESOLVED | Noted: 2019-11-17 | Resolved: 2020-05-17

## 2020-05-17 PROBLEM — G89.29 CHRONIC PAIN OF LEFT KNEE: Status: ACTIVE | Noted: 2020-05-17

## 2020-05-17 PROBLEM — R73.9 HYPERGLYCEMIA: Status: ACTIVE | Noted: 2020-05-17

## 2020-05-17 PROBLEM — J01.00 ACUTE NON-RECURRENT MAXILLARY SINUSITIS: Status: RESOLVED | Noted: 2019-11-17 | Resolved: 2020-05-17

## 2020-05-17 PROBLEM — I73.9 PAD (PERIPHERAL ARTERY DISEASE) (HCC): Status: ACTIVE | Noted: 2020-05-17

## 2020-05-17 PROBLEM — E53.8 LOW SERUM VITAMIN B12: Status: RESOLVED | Noted: 2019-03-04 | Resolved: 2020-05-17

## 2020-05-17 PROBLEM — G45.9 TRANSIENT CEREBRAL ISCHEMIA: Status: RESOLVED | Noted: 2018-01-17 | Resolved: 2020-05-17

## 2020-05-17 PROBLEM — R26.81 UNSTABLE GAIT: Status: ACTIVE | Noted: 2020-05-17

## 2020-05-17 PROBLEM — M54.6 CHRONIC BILATERAL THORACIC BACK PAIN: Status: ACTIVE | Noted: 2020-05-17

## 2020-05-17 PROBLEM — M54.12 CERVICAL RADICULOPATHY: Status: ACTIVE | Noted: 2020-05-17

## 2020-05-17 PROBLEM — M22.2X1 PATELLOFEMORAL STRESS SYNDROME OF BOTH KNEES: Chronic | Status: RESOLVED | Noted: 2017-05-11 | Resolved: 2020-05-17

## 2020-05-17 PROBLEM — K58.2 IRRITABLE BOWEL SYNDROME WITH BOTH CONSTIPATION AND DIARRHEA: Status: RESOLVED | Noted: 2020-02-04 | Resolved: 2020-05-17

## 2020-05-17 PROBLEM — R60.9 PERIPHERAL EDEMA: Status: ACTIVE | Noted: 2020-05-17

## 2020-05-17 PROBLEM — M54.42 CHRONIC BILATERAL LOW BACK PAIN WITH BILATERAL SCIATICA: Status: ACTIVE | Noted: 2020-05-17

## 2020-05-17 PROBLEM — Z91.018 TOMATO ALLERGY: Status: ACTIVE | Noted: 2020-05-17

## 2020-05-17 PROBLEM — M54.50 LOW BACK PAIN: Chronic | Status: RESOLVED | Noted: 2017-10-02 | Resolved: 2020-05-17

## 2020-05-17 PROBLEM — G89.29 CHRONIC BILATERAL LOW BACK PAIN WITH BILATERAL SCIATICA: Status: ACTIVE | Noted: 2020-05-17

## 2020-05-17 PROBLEM — G62.9 PERIPHERAL POLYNEUROPATHY: Status: ACTIVE | Noted: 2020-05-17

## 2020-05-17 PROBLEM — IMO0001 CLASS 3 OBESITY DUE TO EXCESS CALORIES WITH SERIOUS COMORBIDITY AND BODY MASS INDEX (BMI) OF 40.0 TO 44.9 IN ADULT: Chronic | Status: RESOLVED | Noted: 2018-01-17 | Resolved: 2020-05-17

## 2020-05-17 PROBLEM — M25.562 CHRONIC PAIN OF BOTH KNEES: Chronic | Status: RESOLVED | Noted: 2017-05-11 | Resolved: 2020-05-17

## 2020-05-17 PROBLEM — L73.9 FOLLICULITIS: Status: RESOLVED | Noted: 2019-11-17 | Resolved: 2020-05-17

## 2020-05-17 PROBLEM — R43.8: Status: RESOLVED | Noted: 2019-11-17 | Resolved: 2020-05-17

## 2020-05-17 PROBLEM — R20.2 PARESTHESIA OF RIGHT UPPER AND LOWER EXTREMITY: Status: ACTIVE | Noted: 2020-05-17

## 2020-05-17 PROBLEM — R56.9 SEIZURE-LIKE ACTIVITY: Status: ACTIVE | Noted: 2020-05-17

## 2020-05-17 PROBLEM — Z99.89 USE OF CANE AS AMBULATORY AID: Status: ACTIVE | Noted: 2020-05-17

## 2020-05-17 PROBLEM — F31.32 BIPOLAR AFFECTIVE DISORDER, CURRENTLY DEPRESSED, MODERATE (HCC): Status: ACTIVE | Noted: 2020-05-17

## 2020-05-17 PROBLEM — Z86.59 HISTORY OF POSTTRAUMATIC STRESS DISORDER (PTSD): Status: ACTIVE | Noted: 2020-05-17

## 2020-05-17 PROBLEM — M65.321 TRIGGER INDEX FINGER OF RIGHT HAND: Status: RESOLVED | Noted: 2019-11-17 | Resolved: 2020-05-17

## 2020-05-17 PROBLEM — M25.561 CHRONIC PAIN OF BOTH KNEES: Chronic | Status: RESOLVED | Noted: 2017-05-11 | Resolved: 2020-05-17

## 2020-05-17 PROBLEM — R25.2 MUSCLE CRAMP: Status: RESOLVED | Noted: 2019-11-17 | Resolved: 2020-05-17

## 2020-05-17 PROBLEM — G89.29 CHRONIC PAIN OF BOTH KNEES: Chronic | Status: RESOLVED | Noted: 2017-05-11 | Resolved: 2020-05-17

## 2020-05-17 PROBLEM — M22.2X2 PATELLOFEMORAL STRESS SYNDROME OF BOTH KNEES: Chronic | Status: RESOLVED | Noted: 2017-05-11 | Resolved: 2020-05-17

## 2020-05-17 PROBLEM — R20.2 PARESTHESIA OF LEFT UPPER AND LOWER EXTREMITY: Status: ACTIVE | Noted: 2020-05-17

## 2020-05-17 PROBLEM — M25.562 CHRONIC PAIN OF LEFT KNEE: Status: ACTIVE | Noted: 2020-05-17

## 2020-05-17 PROBLEM — R19.7 DIARRHEA: Status: RESOLVED | Noted: 2019-11-17 | Resolved: 2020-05-17

## 2020-05-17 PROBLEM — B35.9 RINGWORM: Status: RESOLVED | Noted: 2019-11-17 | Resolved: 2020-05-17

## 2020-05-17 PROBLEM — G89.29 CHRONIC BILATERAL THORACIC BACK PAIN: Status: ACTIVE | Noted: 2020-05-17

## 2020-05-17 PROBLEM — F17.218 NICOTINE DEPENDENCE, CIGARETTES, WITH OTHER NICOTINE-INDUCED DISORDERS: Chronic | Status: RESOLVED | Noted: 2018-01-17 | Resolved: 2020-05-17

## 2020-07-10 ENCOUNTER — OFFICE VISIT (OUTPATIENT)
Dept: FAMILY MEDICINE CLINIC | Facility: CLINIC | Age: 35
End: 2020-07-10

## 2020-07-10 DIAGNOSIS — M54.41 CHRONIC BILATERAL LOW BACK PAIN WITH BILATERAL SCIATICA: Primary | ICD-10-CM

## 2020-07-10 DIAGNOSIS — H60.503 ACUTE OTITIS EXTERNA OF BOTH EARS, UNSPECIFIED TYPE: ICD-10-CM

## 2020-07-10 DIAGNOSIS — E53.8 VITAMIN B 12 DEFICIENCY: ICD-10-CM

## 2020-07-10 DIAGNOSIS — H66.003 ACUTE SUPPURATIVE OTITIS MEDIA OF BOTH EARS WITHOUT SPONTANEOUS RUPTURE OF TYMPANIC MEMBRANES, RECURRENCE NOT SPECIFIED: ICD-10-CM

## 2020-07-10 DIAGNOSIS — M54.42 CHRONIC BILATERAL LOW BACK PAIN WITH BILATERAL SCIATICA: Primary | ICD-10-CM

## 2020-07-10 DIAGNOSIS — G47.00 INSOMNIA, UNSPECIFIED TYPE: ICD-10-CM

## 2020-07-10 DIAGNOSIS — G89.29 CHRONIC BILATERAL LOW BACK PAIN WITH BILATERAL SCIATICA: Primary | ICD-10-CM

## 2020-07-10 DIAGNOSIS — R42 DIZZINESS: ICD-10-CM

## 2020-07-10 PROCEDURE — 99442 PR PHYS/QHP TELEPHONE EVALUATION 11-20 MIN: CPT | Performed by: NURSE PRACTITIONER

## 2020-07-10 RX ORDER — DOXYCYCLINE 100 MG/1
100 TABLET ORAL 2 TIMES DAILY
Qty: 20 TABLET | Refills: 0 | Status: SHIPPED | OUTPATIENT
Start: 2020-07-10 | End: 2020-07-20

## 2020-07-10 RX ORDER — METHOCARBAMOL 500 MG/1
500 TABLET, FILM COATED ORAL 3 TIMES DAILY PRN
Qty: 90 TABLET | Refills: 5 | Status: SHIPPED | OUTPATIENT
Start: 2020-07-10 | End: 2020-08-07 | Stop reason: SDUPTHER

## 2020-07-10 RX ORDER — MECLIZINE HCL 12.5 MG/1
12.5 TABLET ORAL 3 TIMES DAILY PRN
Qty: 30 TABLET | Refills: 5 | Status: SHIPPED | OUTPATIENT
Start: 2020-07-10 | End: 2021-02-16

## 2020-07-10 RX ORDER — CYANOCOBALAMIN 1000 UG/ML
1000 INJECTION, SOLUTION INTRAMUSCULAR; SUBCUTANEOUS
Qty: 1 ML | Refills: 5 | Status: SHIPPED | OUTPATIENT
Start: 2020-07-10 | End: 2020-12-03 | Stop reason: SDUPTHER

## 2020-07-31 DIAGNOSIS — R11.0 NAUSEA: ICD-10-CM

## 2020-07-31 RX ORDER — ONDANSETRON 4 MG/1
4 TABLET, ORALLY DISINTEGRATING ORAL EVERY 8 HOURS PRN
Qty: 30 TABLET | Refills: 2 | Status: SHIPPED | OUTPATIENT
Start: 2020-07-31 | End: 2021-02-16

## 2020-08-03 RX ORDER — CYCLOBENZAPRINE HCL 10 MG
TABLET ORAL
Qty: 90 TABLET | Refills: 5 | OUTPATIENT
Start: 2020-08-03

## 2020-08-03 NOTE — TELEPHONE ENCOUNTER
I never do two muscle relaxers, she refilled robaxin more recently so I d/c this one and kept robaxin.

## 2020-08-07 ENCOUNTER — TELEPHONE (OUTPATIENT)
Dept: FAMILY MEDICINE CLINIC | Facility: CLINIC | Age: 35
End: 2020-08-07

## 2020-08-07 DIAGNOSIS — G89.29 CHRONIC BILATERAL LOW BACK PAIN WITH BILATERAL SCIATICA: ICD-10-CM

## 2020-08-07 DIAGNOSIS — M54.41 CHRONIC BILATERAL LOW BACK PAIN WITH BILATERAL SCIATICA: ICD-10-CM

## 2020-08-07 DIAGNOSIS — M54.42 CHRONIC BILATERAL LOW BACK PAIN WITH BILATERAL SCIATICA: ICD-10-CM

## 2020-08-07 RX ORDER — METHOCARBAMOL 750 MG/1
750 TABLET, FILM COATED ORAL 3 TIMES DAILY PRN
Qty: 90 TABLET | Refills: 5 | Status: SHIPPED | OUTPATIENT
Start: 2020-08-07 | End: 2021-02-16 | Stop reason: SDUPTHER

## 2020-08-07 NOTE — TELEPHONE ENCOUNTER
----- Message from Cyndi Womack MA sent at 8/7/2020  7:34 AM CDT -----  Regarding: FW: Prescription Question  Contact: [phone]      ----- Message -----  From: Veronica Hicks  Sent: 8/7/2020   5:38 AM CDT  [facility]  Subject: Prescription Question                            Hey mrs melara, they have denied one of my meds. I believe it was the flexiril. Not sure if that was supposed to continue being filled ot not. Its definitely been a major help to my back and my knees although I only take it as necessary not daily most days. Please let me know. Thank claritza

## 2020-08-07 NOTE — TELEPHONE ENCOUNTER
----- Message from Cyndi Womack MA sent at 8/7/2020 11:56 AM CDT -----  Regarding: FW: medicine  Contact: [phone]      ----- Message -----  From: Veronica Hicks  Sent: 8/7/2020  11:45 AM CDT  To: [facility]  Subject: RE: medicine                                     That will be fine    ----- Message -----  From: ABHISHEK Bowman  Sent: 8/7/20 12:18 PM  To: Veronica Hicks  Subject: RE: medicine    Insurance won't cover both, sorry girl. I can increase the robaxin though to the 750mg if you think that will help.     ----- Message -----     From: Veronica Hicks     Sent: 8/7/2020 10:37 AM CDT       To: EFRA MICHEL  Subject: RE: medicine    She had me on both. The robaxin as a daily and the flexiril when badly needed for my back and knees. But I'll stick to the robaxin for now.     ----- Message -----  From: EFRA MICHEL  Sent: 8/7/20 11:32 AM  To: Veronica Hicks  Subject: medicine    Gina wants to know which muscle relaxer would you like she can only prescribe one would you like flexeril or robaxin the robaxin was what was sent in last just let me know which one you want. Thank you.

## 2020-10-22 DIAGNOSIS — E78.5 HYPERLIPIDEMIA, UNSPECIFIED HYPERLIPIDEMIA TYPE: ICD-10-CM

## 2020-10-22 DIAGNOSIS — J30.1 CHRONIC ALLERGIC RHINITIS DUE TO POLLEN: ICD-10-CM

## 2020-10-22 RX ORDER — ATORVASTATIN CALCIUM 20 MG/1
20 TABLET, FILM COATED ORAL NIGHTLY
Qty: 30 TABLET | Refills: 2 | Status: SHIPPED | OUTPATIENT
Start: 2020-10-22 | End: 2020-12-03 | Stop reason: SDUPTHER

## 2020-10-22 RX ORDER — LORATADINE 10 MG/1
10 TABLET ORAL DAILY
Qty: 30 TABLET | Refills: 2 | Status: SHIPPED | OUTPATIENT
Start: 2020-10-22 | End: 2021-02-16 | Stop reason: SDUPTHER

## 2020-10-30 ENCOUNTER — OFFICE VISIT (OUTPATIENT)
Dept: FAMILY MEDICINE CLINIC | Facility: CLINIC | Age: 35
End: 2020-10-30

## 2020-10-30 ENCOUNTER — LAB (OUTPATIENT)
Dept: LAB | Facility: OTHER | Age: 35
End: 2020-10-30

## 2020-10-30 DIAGNOSIS — E53.8 VITAMIN B 12 DEFICIENCY: ICD-10-CM

## 2020-10-30 DIAGNOSIS — R73.9 HYPERGLYCEMIA: ICD-10-CM

## 2020-10-30 DIAGNOSIS — E88.09 ALPHA GALACTOSIDASE DEFICIENCY: ICD-10-CM

## 2020-10-30 DIAGNOSIS — E55.9 VITAMIN D DEFICIENCY: ICD-10-CM

## 2020-10-30 DIAGNOSIS — E78.5 HYPERLIPIDEMIA, UNSPECIFIED HYPERLIPIDEMIA TYPE: ICD-10-CM

## 2020-10-30 DIAGNOSIS — E53.8 VITAMIN B 12 DEFICIENCY: Primary | ICD-10-CM

## 2020-10-30 DIAGNOSIS — K21.9 GASTROESOPHAGEAL REFLUX DISEASE WITHOUT ESOPHAGITIS: Primary | ICD-10-CM

## 2020-10-30 LAB
ALBUMIN SERPL-MCNC: 4.6 G/DL (ref 3.5–5)
ALBUMIN/GLOB SERPL: 1.4 G/DL (ref 1.1–1.8)
ALP SERPL-CCNC: 89 U/L (ref 38–126)
ALT SERPL W P-5'-P-CCNC: 39 U/L
ANION GAP SERPL CALCULATED.3IONS-SCNC: 8 MMOL/L (ref 5–15)
AST SERPL-CCNC: 29 U/L (ref 14–36)
BASOPHILS # BLD AUTO: 0.04 10*3/MM3 (ref 0–0.2)
BASOPHILS NFR BLD AUTO: 0.4 % (ref 0–1.5)
BILIRUB SERPL-MCNC: 0.4 MG/DL (ref 0.2–1.3)
BUN SERPL-MCNC: 11 MG/DL (ref 7–23)
BUN/CREAT SERPL: 13.8 (ref 7–25)
CALCIUM SPEC-SCNC: 10 MG/DL (ref 8.4–10.2)
CHLORIDE SERPL-SCNC: 105 MMOL/L (ref 101–112)
CHOLEST SERPL-MCNC: 195 MG/DL (ref 150–200)
CO2 SERPL-SCNC: 25 MMOL/L (ref 22–30)
CREAT SERPL-MCNC: 0.8 MG/DL (ref 0.52–1.04)
DEPRECATED RDW RBC AUTO: 42 FL (ref 37–54)
EOSINOPHIL # BLD AUTO: 0.24 10*3/MM3 (ref 0–0.4)
EOSINOPHIL NFR BLD AUTO: 2.3 % (ref 0.3–6.2)
ERYTHROCYTE [DISTWIDTH] IN BLOOD BY AUTOMATED COUNT: 14.2 % (ref 12.3–15.4)
GFR SERPL CREATININE-BSD FRML MDRD: 83 ML/MIN/1.73 (ref 64–149)
GLOBULIN UR ELPH-MCNC: 3.4 GM/DL (ref 2.3–3.5)
GLUCOSE SERPL-MCNC: 94 MG/DL (ref 70–99)
HCT VFR BLD AUTO: 44.8 % (ref 34–46.6)
HDLC SERPL-MCNC: 46 MG/DL (ref 40–59)
HGB BLD-MCNC: 14.4 G/DL (ref 12–15.9)
LDLC SERPL CALC-MCNC: 117 MG/DL
LDLC/HDLC SERPL: 2.46 {RATIO} (ref 0–3.22)
LYMPHOCYTES # BLD AUTO: 2.84 10*3/MM3 (ref 0.7–3.1)
LYMPHOCYTES NFR BLD AUTO: 27.7 % (ref 19.6–45.3)
MCH RBC QN AUTO: 26.3 PG (ref 26.6–33)
MCHC RBC AUTO-ENTMCNC: 32.1 G/DL (ref 31.5–35.7)
MCV RBC AUTO: 81.9 FL (ref 79–97)
MONOCYTES # BLD AUTO: 0.61 10*3/MM3 (ref 0.1–0.9)
MONOCYTES NFR BLD AUTO: 5.9 % (ref 5–12)
NEUTROPHILS NFR BLD AUTO: 6.53 10*3/MM3 (ref 1.7–7)
NEUTROPHILS NFR BLD AUTO: 63.7 % (ref 42.7–76)
PLATELET # BLD AUTO: 334 10*3/MM3 (ref 140–450)
PMV BLD AUTO: 9.2 FL (ref 6–12)
POTASSIUM SERPL-SCNC: 4.2 MMOL/L (ref 3.4–5)
PROT SERPL-MCNC: 8 G/DL (ref 6.3–8.6)
RBC # BLD AUTO: 5.47 10*6/MM3 (ref 3.77–5.28)
SODIUM SERPL-SCNC: 138 MMOL/L (ref 137–145)
TRIGL SERPL-MCNC: 179 MG/DL
VLDLC SERPL-MCNC: 32 MG/DL (ref 5–40)
WBC # BLD AUTO: 10.26 10*3/MM3 (ref 3.4–10.8)

## 2020-10-30 PROCEDURE — 99442 PR PHYS/QHP TELEPHONE EVALUATION 11-20 MIN: CPT | Performed by: NURSE PRACTITIONER

## 2020-10-30 PROCEDURE — 86003 ALLG SPEC IGE CRUDE XTRC EA: CPT | Performed by: NURSE PRACTITIONER

## 2020-10-30 PROCEDURE — 82306 VITAMIN D 25 HYDROXY: CPT | Performed by: NURSE PRACTITIONER

## 2020-10-30 PROCEDURE — 84439 ASSAY OF FREE THYROXINE: CPT | Performed by: NURSE PRACTITIONER

## 2020-10-30 PROCEDURE — 83036 HEMOGLOBIN GLYCOSYLATED A1C: CPT | Performed by: NURSE PRACTITIONER

## 2020-10-30 PROCEDURE — 83525 ASSAY OF INSULIN: CPT | Performed by: NURSE PRACTITIONER

## 2020-10-30 PROCEDURE — 86008 ALLG SPEC IGE RECOMB EA: CPT | Performed by: NURSE PRACTITIONER

## 2020-10-30 PROCEDURE — 80061 LIPID PANEL: CPT | Performed by: NURSE PRACTITIONER

## 2020-10-30 PROCEDURE — 80050 GENERAL HEALTH PANEL: CPT | Performed by: NURSE PRACTITIONER

## 2020-10-30 PROCEDURE — 82607 VITAMIN B-12: CPT | Performed by: NURSE PRACTITIONER

## 2020-10-30 PROCEDURE — 86038 ANTINUCLEAR ANTIBODIES: CPT | Performed by: NURSE PRACTITIONER

## 2020-10-30 PROCEDURE — 83527 ASSAY OF INSULIN: CPT | Performed by: NURSE PRACTITIONER

## 2020-10-31 LAB
25(OH)D3 SERPL-MCNC: 12.6 NG/ML (ref 30–100)
HBA1C MFR BLD: 5.44 % (ref 4.8–5.6)
T4 FREE SERPL-MCNC: 0.95 NG/DL (ref 0.93–1.7)
TSH SERPL DL<=0.05 MIU/L-ACNC: 2.81 UIU/ML (ref 0.27–4.2)
VIT B12 BLD-MCNC: 439 PG/ML (ref 211–946)

## 2020-11-02 ENCOUNTER — TELEPHONE (OUTPATIENT)
Dept: FAMILY MEDICINE CLINIC | Facility: CLINIC | Age: 35
End: 2020-11-02

## 2020-11-02 LAB — ANA SER QL: NEGATIVE

## 2020-11-02 RX ORDER — DICYCLOMINE HYDROCHLORIDE 10 MG/1
10 CAPSULE ORAL 3 TIMES DAILY PRN
Qty: 30 CAPSULE | Refills: 0 | Status: SHIPPED | OUTPATIENT
Start: 2020-11-02 | End: 2021-02-16 | Stop reason: SDUPTHER

## 2020-11-03 LAB
BARLEY IGE QN: 0.12 KU/L
BEEF IGE QN: 0.71 KU/L
CABBAGE IGE QN: 0.11 KU/L
CARROT IGE QN: 0.11 KU/L
CHICKEN MEAT IGE QN: <0.1 KU/L
CODFISH IGE QN: <0.1 KU/L
CONV CLASS DESCRIPTION: ABNORMAL
CORN IGE QN: 0.12 KU/L
COW MILK IGE QN: 0.18 KU/L
CRAB IGE QN: <0.1 KU/L
EGG WHITE IGE QN: <0.1 KU/L
GRAPE IGE QN: <0.1 KU/L
GREEN PEPPER IGE QN: <0.1 KU/L
LETTUCE IGE QN: <0.1 KU/L
OAT IGE QN: <0.1 KU/L
ORANGE IGE QN: <0.1 KU/L
PEANUT IGE QN: 0.15 KU/L
PORK IGE QN: 0.57 KU/L
POTATO IGE QN: 0.13 KU/L
RICE IGE QN: 0.14 KU/L
RYE IGE QN: <0.1 KU/L
SHRIMP IGE QN: <0.1 KU/L
SOYBEAN IGE QN: 0.1 KU/L
TOMATO IGE QN: 0.13 KU/L
TUNA IGE QN: <0.1 KU/L
WHEAT IGE QN: 0.11 KU/L
WHITE BEAN IGE QN: 0.12 KU/L

## 2020-11-05 LAB
ALPHA-GAL IGE QN: 1.16 KU/L
BEEF IGE QN: 0.78 KU/L
DEPRECATED BEEF IGE RAST QL: 2
DEPRECATED LAMB IGE RAST QL: ABNORMAL
DEPRECATED PORK IGE RAST QL: 1
LAMB IGE QN: 0.27 KU/L
PORK IGE QN: 0.53 KU/L

## 2020-11-06 DIAGNOSIS — R73.9 HYPERGLYCEMIA: ICD-10-CM

## 2020-11-06 DIAGNOSIS — K64.4 EXTERNAL HEMORRHOID: ICD-10-CM

## 2020-11-06 DIAGNOSIS — E16.2 HYPOGLYCEMIA: ICD-10-CM

## 2020-11-06 RX ORDER — DIAPER,BRIEF,INFANT-TODD,DISP
EACH MISCELLANEOUS 2 TIMES DAILY
Qty: 1.5 G | Refills: 0 | Status: SHIPPED | OUTPATIENT
Start: 2020-11-06 | End: 2021-02-16

## 2020-11-09 LAB
INSULIN FREE SERPL-ACNC: 17 UU/ML
INSULIN SERPL-ACNC: 17 UU/ML

## 2020-11-12 DIAGNOSIS — R73.02 GLUCOSE INTOLERANCE (IMPAIRED GLUCOSE TOLERANCE): Primary | ICD-10-CM

## 2020-11-16 NOTE — PROGRESS NOTES
Subjective   Veronica Hicks is a 34 y.o. female who presents via telephone visit for     History of Present Illness     Last labs: N/A    You have chosen to receive care through a telephone visit. Do you consent to use a telephone visit for your medical care today? Yes    Telephone visit lasted 15  minutes.      Referral     F/U: N/A     The following portions of the patient's history were reviewed and updated as appropriate: allergies, current medications, past family history, past medical history, past social history, past surgical history and problem list.    Review of Systems      Objective   LMP  (LMP Unknown) Comment: w BSO  Physical Exam     PHQ-2/PHQ-9 Depression Screening 3/18/2019   Little interest or pleasure in doing things 0   Feeling down, depressed, or hopeless 0   Trouble falling or staying asleep, or sleeping too much -   Feeling tired or having little energy -   Poor appetite or overeating -   Feeling bad about yourself - or that you are a failure or have let yourself or your family down -   Trouble concentrating on things, such as reading the newspaper or watching television -   Moving or speaking so slowly that other people could have noticed. Or the opposite - being so fidgety or restless that you have been moving around a lot more than usual -   Thoughts that you would be better off dead, or of hurting yourself in some way -   Total Score 0   If you checked off any problems, how difficult have these problems made it for you to do your work, take care of things at home, or get along with other people? -         Assessment/Plan   Diagnoses and all orders for this visit:    1. Gastroesophageal reflux disease without esophagitis (Primary)             Plan of Care:    Please See History of Present Illness(HPI) above for Plan of Care (POC) for individualized diagnoses as well as when to follow up.     Patient educated to follow-up sooner than next scheduled appointment if condition(s) worse or do  not improve. Patient states understanding and is in agreeance with plan of care. An After Visit Summary was printed and given to the patient.      ABHISHEK Bowman        This document has been electronically signed by ABHISHEK Bowman on November 15, 2020 22:17 CST      EMR/Transcription Dragon Disclaimer:  Some of this note may be an electronic dragon transcription/translation of spoken language to printed text. The electronic translation of spoken language may permit erroneous, or at times, nonsensical words or phrases to be inadvertently transcribed. Although I have reviewed the note for such errors, some may still exist.

## 2020-12-03 DIAGNOSIS — M54.6 CHRONIC BILATERAL THORACIC BACK PAIN: ICD-10-CM

## 2020-12-03 DIAGNOSIS — E55.9 VITAMIN D DEFICIENCY: ICD-10-CM

## 2020-12-03 DIAGNOSIS — G89.29 CHRONIC BILATERAL LOW BACK PAIN WITHOUT SCIATICA: ICD-10-CM

## 2020-12-03 DIAGNOSIS — M54.50 CHRONIC BILATERAL LOW BACK PAIN WITHOUT SCIATICA: ICD-10-CM

## 2020-12-03 DIAGNOSIS — I73.9 PAD (PERIPHERAL ARTERY DISEASE) (HCC): ICD-10-CM

## 2020-12-03 DIAGNOSIS — E78.5 HYPERLIPIDEMIA, UNSPECIFIED HYPERLIPIDEMIA TYPE: ICD-10-CM

## 2020-12-03 DIAGNOSIS — E53.8 VITAMIN B 12 DEFICIENCY: ICD-10-CM

## 2020-12-03 DIAGNOSIS — E87.6 HYPOKALEMIA: ICD-10-CM

## 2020-12-03 DIAGNOSIS — L67.9: ICD-10-CM

## 2020-12-03 DIAGNOSIS — G89.29 CHRONIC BILATERAL THORACIC BACK PAIN: ICD-10-CM

## 2020-12-03 DIAGNOSIS — J30.1 CHRONIC ALLERGIC RHINITIS DUE TO POLLEN: ICD-10-CM

## 2020-12-03 DIAGNOSIS — J44.9 CHRONIC OBSTRUCTIVE PULMONARY DISEASE, UNSPECIFIED COPD TYPE (HCC): ICD-10-CM

## 2020-12-04 RX ORDER — POTASSIUM CHLORIDE 750 MG/1
10 TABLET, FILM COATED, EXTENDED RELEASE ORAL DAILY
Qty: 30 TABLET | Refills: 2 | Status: SHIPPED | OUTPATIENT
Start: 2020-12-04 | End: 2021-02-16 | Stop reason: SDUPTHER

## 2020-12-04 RX ORDER — CILOSTAZOL 100 MG/1
100 TABLET ORAL DAILY
Qty: 30 TABLET | Refills: 5 | Status: SHIPPED | OUTPATIENT
Start: 2020-12-04 | End: 2021-02-16 | Stop reason: SDUPTHER

## 2020-12-04 RX ORDER — ALBUTEROL SULFATE 90 UG/1
2 AEROSOL, METERED RESPIRATORY (INHALATION) EVERY 6 HOURS PRN
Qty: 18 G | Refills: 5 | Status: SHIPPED | OUTPATIENT
Start: 2020-12-04 | End: 2021-02-16 | Stop reason: SDUPTHER

## 2020-12-04 RX ORDER — SPIRONOLACTONE 25 MG/1
25 TABLET ORAL DAILY
Qty: 30 TABLET | Refills: 5 | Status: SHIPPED | OUTPATIENT
Start: 2020-12-04 | End: 2021-02-16 | Stop reason: SDUPTHER

## 2020-12-04 RX ORDER — MIRTAZAPINE 7.5 MG/1
7.5 TABLET, FILM COATED ORAL NIGHTLY
Qty: 30 TABLET | Refills: 5 | Status: SHIPPED | OUTPATIENT
Start: 2020-12-04 | End: 2021-02-16

## 2020-12-04 RX ORDER — FLUTICASONE PROPIONATE 50 MCG
2 SPRAY, SUSPENSION (ML) NASAL DAILY
Qty: 16 G | Refills: 5 | Status: SHIPPED | OUTPATIENT
Start: 2020-12-04 | End: 2021-02-16 | Stop reason: SDUPTHER

## 2020-12-04 RX ORDER — ATORVASTATIN CALCIUM 20 MG/1
20 TABLET, FILM COATED ORAL NIGHTLY
Qty: 30 TABLET | Refills: 2 | Status: SHIPPED | OUTPATIENT
Start: 2020-12-04 | End: 2021-02-16 | Stop reason: SDUPTHER

## 2020-12-04 RX ORDER — CITALOPRAM 20 MG/1
20 TABLET ORAL DAILY
Qty: 30 TABLET | Refills: 5 | Status: SHIPPED | OUTPATIENT
Start: 2020-12-04 | End: 2021-02-16 | Stop reason: SDUPTHER

## 2020-12-04 RX ORDER — CYANOCOBALAMIN 1000 UG/ML
1000 INJECTION, SOLUTION INTRAMUSCULAR; SUBCUTANEOUS
Qty: 1 ML | Refills: 5 | Status: SHIPPED | OUTPATIENT
Start: 2020-12-04 | End: 2021-02-16 | Stop reason: SDUPTHER

## 2020-12-04 RX ORDER — ERGOCALCIFEROL 1.25 MG/1
50000 CAPSULE ORAL 2 TIMES WEEKLY
Qty: 10 CAPSULE | Refills: 5 | Status: SHIPPED | OUTPATIENT
Start: 2020-12-07 | End: 2021-02-16 | Stop reason: SDUPTHER

## 2020-12-07 DIAGNOSIS — E16.2 HYPOGLYCEMIA: ICD-10-CM

## 2020-12-07 DIAGNOSIS — R73.9 HYPERGLYCEMIA: ICD-10-CM

## 2020-12-08 RX ORDER — LANCETS 30 GAUGE
EACH MISCELLANEOUS
Qty: 100 EACH | Refills: 5 | Status: SHIPPED | OUTPATIENT
Start: 2020-12-08

## 2021-01-04 ENCOUNTER — LAB (OUTPATIENT)
Dept: LAB | Facility: OTHER | Age: 36
End: 2021-01-04

## 2021-01-04 DIAGNOSIS — R73.02 GLUCOSE INTOLERANCE (IMPAIRED GLUCOSE TOLERANCE): ICD-10-CM

## 2021-01-04 LAB
ALBUMIN SERPL-MCNC: 4.4 G/DL (ref 3.5–5)
ALBUMIN/GLOB SERPL: 1.4 G/DL (ref 1.1–1.8)
ALP SERPL-CCNC: 73 U/L (ref 38–126)
ALT SERPL W P-5'-P-CCNC: 43 U/L
ANION GAP SERPL CALCULATED.3IONS-SCNC: 8 MMOL/L (ref 5–15)
AST SERPL-CCNC: 29 U/L (ref 14–36)
BILIRUB SERPL-MCNC: 0.5 MG/DL (ref 0.2–1.3)
BUN SERPL-MCNC: 13 MG/DL (ref 7–23)
BUN/CREAT SERPL: 13.7 (ref 7–25)
CALCIUM SPEC-SCNC: 9.5 MG/DL (ref 8.4–10.2)
CHLORIDE SERPL-SCNC: 103 MMOL/L (ref 101–112)
CO2 SERPL-SCNC: 28 MMOL/L (ref 22–30)
CREAT SERPL-MCNC: 0.95 MG/DL (ref 0.52–1.04)
GFR SERPL CREATININE-BSD FRML MDRD: 68 ML/MIN/1.73 (ref 64–149)
GLOBULIN UR ELPH-MCNC: 3.1 GM/DL (ref 2.3–3.5)
GLUCOSE SERPL-MCNC: 102 MG/DL (ref 70–99)
POTASSIUM SERPL-SCNC: 3.6 MMOL/L (ref 3.4–5)
PROT SERPL-MCNC: 7.5 G/DL (ref 6.3–8.6)
SODIUM SERPL-SCNC: 139 MMOL/L (ref 137–145)

## 2021-01-04 PROCEDURE — 80053 COMPREHEN METABOLIC PANEL: CPT | Performed by: NURSE PRACTITIONER

## 2021-01-05 ENCOUNTER — OFFICE VISIT (OUTPATIENT)
Dept: FAMILY MEDICINE CLINIC | Facility: CLINIC | Age: 36
End: 2021-01-05

## 2021-01-05 DIAGNOSIS — R10.10 UPPER ABDOMINAL PAIN: ICD-10-CM

## 2021-01-05 DIAGNOSIS — R11.2 NAUSEA AND VOMITING, INTRACTABILITY OF VOMITING NOT SPECIFIED, UNSPECIFIED VOMITING TYPE: ICD-10-CM

## 2021-01-05 DIAGNOSIS — G47.00 INSOMNIA, UNSPECIFIED TYPE: ICD-10-CM

## 2021-01-05 DIAGNOSIS — R19.7 DIARRHEA, UNSPECIFIED TYPE: ICD-10-CM

## 2021-01-05 DIAGNOSIS — R07.89 MID STERNAL CHEST PAIN: Primary | ICD-10-CM

## 2021-01-05 PROCEDURE — 99443 PR PHYS/QHP TELEPHONE EVALUATION 21-30 MIN: CPT | Performed by: NURSE PRACTITIONER

## 2021-01-05 RX ORDER — ZOLPIDEM TARTRATE 10 MG/1
10 TABLET ORAL NIGHTLY
Qty: 30 TABLET | Refills: 0 | Status: SHIPPED | OUTPATIENT
Start: 2021-01-05 | End: 2021-02-16 | Stop reason: SDUPTHER

## 2021-01-11 ENCOUNTER — LAB (OUTPATIENT)
Dept: LAB | Facility: OTHER | Age: 36
End: 2021-01-11

## 2021-01-11 DIAGNOSIS — R19.7 DIARRHEA, UNSPECIFIED TYPE: ICD-10-CM

## 2021-01-11 DIAGNOSIS — R10.10 UPPER ABDOMINAL PAIN: ICD-10-CM

## 2021-01-11 DIAGNOSIS — R11.2 NAUSEA AND VOMITING, INTRACTABILITY OF VOMITING NOT SPECIFIED, UNSPECIFIED VOMITING TYPE: ICD-10-CM

## 2021-01-11 LAB
ALBUMIN SERPL-MCNC: 4.3 G/DL (ref 3.5–5)
ALBUMIN/GLOB SERPL: 1.3 G/DL (ref 1.1–1.8)
ALP SERPL-CCNC: 77 U/L (ref 38–126)
ALT SERPL W P-5'-P-CCNC: 45 U/L
AMYLASE SERPL-CCNC: 51 U/L (ref 30–110)
ANION GAP SERPL CALCULATED.3IONS-SCNC: 7 MMOL/L (ref 5–15)
AST SERPL-CCNC: 33 U/L (ref 14–36)
BASOPHILS # BLD MANUAL: 0.11 10*3/MM3 (ref 0–0.2)
BASOPHILS NFR BLD AUTO: 1 % (ref 0–1.5)
BILIRUB SERPL-MCNC: 0.3 MG/DL (ref 0.2–1.3)
BUN SERPL-MCNC: 15 MG/DL (ref 7–23)
BUN/CREAT SERPL: 17.6 (ref 7–25)
CALCIUM SPEC-SCNC: 9.9 MG/DL (ref 8.4–10.2)
CHLORIDE SERPL-SCNC: 105 MMOL/L (ref 101–112)
CO2 SERPL-SCNC: 28 MMOL/L (ref 22–30)
CREAT SERPL-MCNC: 0.85 MG/DL (ref 0.52–1.04)
DEPRECATED RDW RBC AUTO: 41 FL (ref 37–54)
EOSINOPHIL # BLD MANUAL: 0.23 10*3/MM3 (ref 0–0.4)
EOSINOPHIL NFR BLD MANUAL: 2 % (ref 0.3–6.2)
ERYTHROCYTE [DISTWIDTH] IN BLOOD BY AUTOMATED COUNT: 14.1 % (ref 12.3–15.4)
ERYTHROCYTE [SEDIMENTATION RATE] IN BLOOD: 6 MM/HR (ref 0–20)
GFR SERPL CREATININE-BSD FRML MDRD: 77 ML/MIN/1.73 (ref 64–149)
GGT SERPL-CCNC: 26 U/L (ref 5–36)
GLOBULIN UR ELPH-MCNC: 3.3 GM/DL (ref 2.3–3.5)
GLUCOSE SERPL-MCNC: 106 MG/DL (ref 70–99)
HCT VFR BLD AUTO: 44.5 % (ref 34–46.6)
HGB BLD-MCNC: 14.8 G/DL (ref 12–15.9)
LIPASE SERPL-CCNC: 36 U/L (ref 13–60)
LYMPHOCYTES # BLD MANUAL: 3.18 10*3/MM3 (ref 0.7–3.1)
LYMPHOCYTES NFR BLD MANUAL: 28 % (ref 19.6–45.3)
LYMPHOCYTES NFR BLD MANUAL: 8 % (ref 5–12)
MCH RBC QN AUTO: 26.5 PG (ref 26.6–33)
MCHC RBC AUTO-ENTMCNC: 33.3 G/DL (ref 31.5–35.7)
MCV RBC AUTO: 79.6 FL (ref 79–97)
MICROCYTES BLD QL: ABNORMAL
MONOCYTES # BLD AUTO: 0.91 10*3/MM3 (ref 0.1–0.9)
NEUTROPHILS # BLD AUTO: 6.71 10*3/MM3 (ref 1.7–7)
NEUTROPHILS NFR BLD MANUAL: 59 % (ref 42.7–76)
PLATELET # BLD AUTO: 345 10*3/MM3 (ref 140–450)
PMV BLD AUTO: 9 FL (ref 6–12)
POTASSIUM SERPL-SCNC: 4.1 MMOL/L (ref 3.4–5)
PROT SERPL-MCNC: 7.6 G/DL (ref 6.3–8.6)
RBC # BLD AUTO: 5.59 10*6/MM3 (ref 3.77–5.28)
SMALL PLATELETS BLD QL SMEAR: ADEQUATE
SODIUM SERPL-SCNC: 140 MMOL/L (ref 137–145)
VARIANT LYMPHS NFR BLD MANUAL: 2 % (ref 0–5)
WBC # BLD AUTO: 11.37 10*3/MM3 (ref 3.4–10.8)
WBC MORPH BLD: NORMAL

## 2021-01-11 PROCEDURE — 86671 FUNGUS NES ANTIBODY: CPT | Performed by: NURSE PRACTITIONER

## 2021-01-11 PROCEDURE — 86003 ALLG SPEC IGE CRUDE XTRC EA: CPT | Performed by: NURSE PRACTITIONER

## 2021-01-11 PROCEDURE — 83690 ASSAY OF LIPASE: CPT | Performed by: NURSE PRACTITIONER

## 2021-01-11 PROCEDURE — 86677 HELICOBACTER PYLORI ANTIBODY: CPT | Performed by: NURSE PRACTITIONER

## 2021-01-11 PROCEDURE — 82977 ASSAY OF GGT: CPT | Performed by: NURSE PRACTITIONER

## 2021-01-11 PROCEDURE — 83516 IMMUNOASSAY NONANTIBODY: CPT | Performed by: NURSE PRACTITIONER

## 2021-01-11 PROCEDURE — 85025 COMPLETE CBC W/AUTO DIFF WBC: CPT | Performed by: NURSE PRACTITIONER

## 2021-01-11 PROCEDURE — 82150 ASSAY OF AMYLASE: CPT | Performed by: NURSE PRACTITIONER

## 2021-01-11 PROCEDURE — 86256 FLUORESCENT ANTIBODY TITER: CPT | Performed by: NURSE PRACTITIONER

## 2021-01-11 PROCEDURE — 80053 COMPREHEN METABOLIC PANEL: CPT | Performed by: NURSE PRACTITIONER

## 2021-01-11 PROCEDURE — 85651 RBC SED RATE NONAUTOMATED: CPT | Performed by: NURSE PRACTITIONER

## 2021-01-12 LAB
GLIADIN PEPTIDE IGA SER-ACNC: 2 UNITS (ref 0–19)
GLIADIN PEPTIDE IGG SER-ACNC: 2 UNITS (ref 0–19)
H PYLORI IGA SER-ACNC: <9 UNITS (ref 0–8.9)
H PYLORI IGG SER IA-ACNC: 1.35 INDEX VALUE (ref 0–0.79)
TTG IGA SER-ACNC: <2 U/ML (ref 0–3)
TTG IGG SER-ACNC: 6 U/ML (ref 0–5)

## 2021-01-13 LAB
BAKER'S YEAST IGA QN: <20 UNITS (ref 0–24.9)
BAKER'S YEAST IGG QN: <20 UNITS (ref 0–24.9)
P-ANCA ATYPICAL TITR SER IF: NORMAL TITER

## 2021-01-15 LAB
CODFISH IGE QN: <0.1 KU/L
CONV CLASS DESCRIPTION: ABNORMAL
COW MILK IGE QN: 0.11 KU/L
EGG WHITE IGE QN: <0.1 KU/L
GLUTEN IGE QN: <0.1 KU/L
HAZELNUT IGE QN: <0.1 KU/L
PEANUT IGE QN: 0.14 KU/L
SCALLOP IGE QN: <0.1 KU/L
SESAME SEED IGE QN: 0.12 KU/L
SHRIMP IGE QN: <0.1 KU/L
SOYBEAN IGE QN: <0.1 KU/L
WALNUT IGE QN: <0.1 KU/L
WHEAT IGE QN: 0.1 KU/L

## 2021-01-19 DIAGNOSIS — R19.8 ALTERED BOWEL FUNCTION: ICD-10-CM

## 2021-01-19 DIAGNOSIS — A04.8 H. PYLORI INFECTION: ICD-10-CM

## 2021-01-19 DIAGNOSIS — K76.0 FATTY LIVER: Primary | ICD-10-CM

## 2021-01-19 NOTE — PROGRESS NOTES
I am going to go over the results and then try and answer patient's question that she sent to me via [facility].    Patient has fatty liver which is usually secondary to family history/weight gain/metabolic disorders.  Would be a good idea to have her see GI just to establish care and follow-up with which she should be hearing from them soon especially due to some of her results which I will continue to discuss.  GI will decide if fatty liver symptoms need to continue to monitor.    Her amylase and lipase enzymes are normal which means she does not have any pancreatitis.    Her gamma GT and sed rates are not elevated which usually means that it is less likely to be a gallbladder problem and there is no inflammatory problems going on such as like a Crohn's.    Her inflammatory bowel disorders panel that I did was negative for work-ups however she does have some other findings that are nonspecific for underlying inflammatory bowel disease so she definitely needs a scope to look at everything.  Most likely could be secondary due to the H. pylori infection that she has positive for.    Allergies show that she has allergies to peanuts, sesame seeds, cows milk, and wheat.  Does not mean she will necessarily have a reaction to this but is a possibility.  So please keep these in mind to be very cautious.    She does have a somewhat elevated white blood cell count as well so I am wondering if this is due to the underlying infection from the H. pylori.    So I am going to go into discussing H. pylori a little bit more now that we reviewed all her results.  H. pylori as an infection of the gut and has to be treated with reflux medicine twice a day and 2 types of antibiotics.  I will review her allergy list to her back with her later today via [facility] message to tell her what type of antibiotics is going to put her on because she has a lot of allergies and to interact so we need to make sure that look at this very closely and  research the treatments for H. pylori.  It will be a 2-week treatment.  What can happen with H. pylori as it can attack the stomach and cause ulcer so she does need a scope to look into this and to rule out any Crohn's or underlying inflammatory disorders and take biopsies if they see anything abnormal.  So I am going to refer her to GI to consult her on her elevated liver enzymes, fatty liver and do a follow-up for a colonoscopy.  May be endoscopy depending on what they say.    On her labs it does not show that she is becoming anemic.  It shows that she is dividing an infection which is most likely secondary to the H. pylori however I really think we need to scope to make sure nothing else is going on to collect some biopsies to test for other infections of the stomach.  Anemia would be if the hematocrit or hemoglobin are below normal levels which they are currently not.  Her red blood cells are slightly elevated that does not mean anemia that actually means opposite of polycythemia which is probably secondary to vaping/smoking.  Those levels are not high enough to treat we just watch them and make sure they do not climb especially that H and H which is a hematocrit and hemoglobin if they start to climb to then we would have to get blood drawn off.  Because of the risk for clots.    I am hoping that the infection is causing your stomach to not empty right and the constipation however the goal would be to treat the infection and then reassess and if that is not the case we probably need to do a GI gastric emptying test to make sure that she do not have gastroparesis which is delaying your emptying process.  The nausea and pain waves are most likely due to the H. pylori as well.  Does she want something for nausea.    I do not want to fully rule out that it is not her gallbladder she could have issues with her gallbladder functioning which we will reevaluate but it is less likely and we need to treat the infection  first do a scope if not improving will do a GI emptying test and then do a HIDA scan that is the way the insurance covers it and the way that suggested to do because I do not think it is her gallbladder.  Especially because her gamma GT was normal.  Now as far as her liver goes it is probably not causing any of the GI problems that she is having how ever the worry is that eventually she could develop cirrhosis or liver cancer over time as well as to seeing why she is having liver issues in the first place could just be due to metabolic disorders/weight gain etc. but that is what we will have GI do a further work-up on to evaluate but I do not think the liver problems are contributing to her nausea abdominal pain and constipation and decreased bowel movements/GI motility problems.      If that does not make sense to just have her message me back and I will look at her my chart messages.     So step 1 treat with antibiotics x2 different types with proton pump inhibitor like Nexium or omeprazole twice a day I will call this regimen and later today after I do some research please send back to me so remember to do that but I am also going to leave this up on my result note so I do not forget to do it.    Step 2 will be getting a scope done to make sure that there are no other underlying issues.  Get biopsies of any irritated areas of the entire GI tract and do cultures to see what is going on with them.    Step 3 if constipation/pain/nausea are still an issue we will do a GI emptying test to see if she has gastroparesis which would be delayed GI motility leading to her constipation and other problems.  If this is negative or even if it is not negative we will do a HIDA scan after this.  A HIDA scan evaluates how her gallbladder is functioning just because we look that with an ultrasound means that we ruled out that it does not have any sludge in it it is not inflamed and it does not have gallstones but it does not mean  it is functioning properly however based on her symptoms this is least likely so we will state that to last due to what insurance will cover.    Let me know she is okay with all this.  Also let her know is in her my chart to review.

## 2021-01-22 ENCOUNTER — DOCUMENTATION (OUTPATIENT)
Dept: FAMILY MEDICINE CLINIC | Facility: CLINIC | Age: 36
End: 2021-01-22

## 2021-01-22 RX ORDER — OMEPRAZOLE 40 MG/1
40 CAPSULE, DELAYED RELEASE ORAL 2 TIMES DAILY
Qty: 28 CAPSULE | Refills: 0 | Status: SHIPPED | OUTPATIENT
Start: 2021-01-22 | End: 2021-02-05

## 2021-01-22 RX ORDER — METRONIDAZOLE 500 MG/1
500 TABLET ORAL 2 TIMES DAILY
Qty: 14 TABLET | Refills: 0 | Status: SHIPPED | OUTPATIENT
Start: 2021-01-22 | End: 2021-01-29

## 2021-01-22 RX ORDER — TETRACYCLINE HYDROCHLORIDE 250 MG/1
250 CAPSULE ORAL 4 TIMES DAILY
Qty: 56 CAPSULE | Refills: 0 | Status: SHIPPED | OUTPATIENT
Start: 2021-01-22 | End: 2021-02-05

## 2021-01-29 ENCOUNTER — TELEPHONE (OUTPATIENT)
Dept: FAMILY MEDICINE CLINIC | Facility: CLINIC | Age: 36
End: 2021-01-29

## 2021-02-01 ENCOUNTER — TELEPHONE (OUTPATIENT)
Dept: FAMILY MEDICINE CLINIC | Facility: CLINIC | Age: 36
End: 2021-02-01

## 2021-02-01 RX ORDER — GUAIFENESIN 600 MG/1
1200 TABLET, EXTENDED RELEASE ORAL 2 TIMES DAILY
Qty: 40 TABLET | Refills: 0 | Status: SHIPPED | OUTPATIENT
Start: 2021-02-01 | End: 2021-02-16

## 2021-02-03 ENCOUNTER — TELEPHONE (OUTPATIENT)
Dept: FAMILY MEDICINE CLINIC | Facility: CLINIC | Age: 36
End: 2021-02-03

## 2021-02-03 DIAGNOSIS — A04.8 H. PYLORI INFECTION: Primary | ICD-10-CM

## 2021-02-03 NOTE — TELEPHONE ENCOUNTER
----- Message from Winnie Miner MA sent at 2/3/2021  7:46 AM CST -----  Regarding: FW: Prescription Question  Contact: [phone]    ----- Message -----  From: Belinda Newell MA  Sent: 2/3/2021   7:28 AM CST  To: Cyndi Womack MA  Subject: FW: Prescription Question                          ----- Message -----  From: Veronica Hicks  Sent: 2/2/2021  10:16 PM CST  To: [facility]  Subject: RE: Prescription Question                        Sounds great on all fronts and if you are ok with it, we can set me up for a retest on the h pylori and if I get in with gi before the restart we can go by what they figure out. But at the same time, if I haven't gotten in with them we can still get to recheck on it. Sound like a plan?    ----- Message -----  From: ABHISHEK Bowman  Sent: 2/2/21, 5:00 PM  To: Veronica Hicks  Subject: RE: Prescription Question    Shikha Ms. Hicks,  I will look at [facility] staff and refill it.  Not sure why he does not have refills on it but it may have been just a click of a thing where I refill it too fast and forget to put the refills on it.  On the antibiotic stuff, when we discussed it I was concerned about putting you on a different antibiotic other than the doxycycline because you have so many allergies and or make anything worse.  Flagyl is the first course for it so what we could do is do the Flagyl and then retest if you want.  We could do the Flagyl and wait until you see GI and see what they say and add an antibiotic according to what they think.  But also in the meantime I will try to get a preauthorization on the doxycycline to get it approved..  Let me know if that is okay.      ----- Message -----       From:Veronica Hicks       Sent:2/1/2021  4:59 PM EST         To:ABHISHEK Bowman    Subject:Prescription Question    Sandy Fuentes, 2 issues. 1 [facility] has no refills. 2 they won't cover the other antibiotic or the other med  for my stomach for the h pylori stuff.

## 2021-02-11 DIAGNOSIS — I73.9 PAD (PERIPHERAL ARTERY DISEASE) (HCC): ICD-10-CM

## 2021-02-11 RX ORDER — OMEPRAZOLE 40 MG/1
40 CAPSULE, DELAYED RELEASE ORAL 2 TIMES DAILY
Qty: 30 CAPSULE | Refills: 0 | Status: SHIPPED | OUTPATIENT
Start: 2021-02-11 | End: 2021-02-16

## 2021-02-11 RX ORDER — ASPIRIN 81 MG/1
81 TABLET ORAL DAILY
Qty: 30 TABLET | Refills: 5 | Status: SHIPPED | OUTPATIENT
Start: 2021-02-11 | End: 2021-02-16 | Stop reason: SDUPTHER

## 2021-02-16 ENCOUNTER — OFFICE VISIT (OUTPATIENT)
Dept: FAMILY MEDICINE CLINIC | Facility: CLINIC | Age: 36
End: 2021-02-16

## 2021-02-16 VITALS — HEIGHT: 63 IN | BODY MASS INDEX: 43.41 KG/M2 | WEIGHT: 245 LBS

## 2021-02-16 DIAGNOSIS — G89.29 CHRONIC BILATERAL LOW BACK PAIN WITH BILATERAL SCIATICA: ICD-10-CM

## 2021-02-16 DIAGNOSIS — E55.9 VITAMIN D DEFICIENCY: ICD-10-CM

## 2021-02-16 DIAGNOSIS — E53.8 VITAMIN B 12 DEFICIENCY: ICD-10-CM

## 2021-02-16 DIAGNOSIS — M54.42 CHRONIC BILATERAL LOW BACK PAIN WITH BILATERAL SCIATICA: ICD-10-CM

## 2021-02-16 DIAGNOSIS — L73.2 SUPPURATIVE HIDRADENITIS: ICD-10-CM

## 2021-02-16 DIAGNOSIS — J44.9 CHRONIC OBSTRUCTIVE PULMONARY DISEASE, UNSPECIFIED COPD TYPE (HCC): ICD-10-CM

## 2021-02-16 DIAGNOSIS — Z91.09 ENVIRONMENTAL ALLERGIES: ICD-10-CM

## 2021-02-16 DIAGNOSIS — E78.5 HYPERLIPIDEMIA, UNSPECIFIED HYPERLIPIDEMIA TYPE: ICD-10-CM

## 2021-02-16 DIAGNOSIS — E87.6 HYPOKALEMIA: ICD-10-CM

## 2021-02-16 DIAGNOSIS — G89.29 CHRONIC BILATERAL THORACIC BACK PAIN: ICD-10-CM

## 2021-02-16 DIAGNOSIS — G89.29 CHRONIC BILATERAL LOW BACK PAIN WITHOUT SCIATICA: ICD-10-CM

## 2021-02-16 DIAGNOSIS — J30.1 CHRONIC ALLERGIC RHINITIS DUE TO POLLEN: ICD-10-CM

## 2021-02-16 DIAGNOSIS — F41.9 ANXIETY: Primary | ICD-10-CM

## 2021-02-16 DIAGNOSIS — K21.9 GASTROESOPHAGEAL REFLUX DISEASE WITHOUT ESOPHAGITIS: ICD-10-CM

## 2021-02-16 DIAGNOSIS — L67.9: ICD-10-CM

## 2021-02-16 DIAGNOSIS — M25.50 ARTHRALGIA, UNSPECIFIED JOINT: ICD-10-CM

## 2021-02-16 DIAGNOSIS — M54.41 CHRONIC BILATERAL LOW BACK PAIN WITH BILATERAL SCIATICA: ICD-10-CM

## 2021-02-16 DIAGNOSIS — I73.9 PAD (PERIPHERAL ARTERY DISEASE) (HCC): ICD-10-CM

## 2021-02-16 DIAGNOSIS — M54.50 CHRONIC BILATERAL LOW BACK PAIN WITHOUT SCIATICA: ICD-10-CM

## 2021-02-16 DIAGNOSIS — M54.6 CHRONIC BILATERAL THORACIC BACK PAIN: ICD-10-CM

## 2021-02-16 DIAGNOSIS — E53.8 VITAMIN B12 DEFICIENCY: ICD-10-CM

## 2021-02-16 DIAGNOSIS — G47.00 INSOMNIA, UNSPECIFIED TYPE: ICD-10-CM

## 2021-02-16 PROCEDURE — 99443 PR PHYS/QHP TELEPHONE EVALUATION 21-30 MIN: CPT | Performed by: NURSE PRACTITIONER

## 2021-02-16 RX ORDER — ATORVASTATIN CALCIUM 20 MG/1
20 TABLET, FILM COATED ORAL NIGHTLY
Qty: 30 TABLET | Refills: 5 | Status: SHIPPED | OUTPATIENT
Start: 2021-02-16

## 2021-02-16 RX ORDER — CILOSTAZOL 100 MG/1
100 TABLET ORAL DAILY
Qty: 30 TABLET | Refills: 5 | Status: SHIPPED | OUTPATIENT
Start: 2021-02-16

## 2021-02-16 RX ORDER — CYANOCOBALAMIN 1000 UG/ML
1000 INJECTION, SOLUTION INTRAMUSCULAR; SUBCUTANEOUS
Qty: 1 ML | Refills: 5 | Status: SHIPPED | OUTPATIENT
Start: 2021-02-16

## 2021-02-16 RX ORDER — CITALOPRAM 20 MG/1
20 TABLET ORAL DAILY
Qty: 30 TABLET | Refills: 5 | Status: SHIPPED | OUTPATIENT
Start: 2021-02-16 | End: 2021-05-27

## 2021-02-16 RX ORDER — SPIRONOLACTONE 25 MG/1
25 TABLET ORAL DAILY
Qty: 30 TABLET | Refills: 5 | Status: SHIPPED | OUTPATIENT
Start: 2021-02-16

## 2021-02-16 RX ORDER — LORATADINE 10 MG/1
10 TABLET ORAL DAILY
Qty: 30 TABLET | Refills: 2 | Status: SHIPPED | OUTPATIENT
Start: 2021-02-16

## 2021-02-16 RX ORDER — FLUTICASONE PROPIONATE 50 MCG
2 SPRAY, SUSPENSION (ML) NASAL DAILY
Qty: 16 G | Refills: 5 | Status: SHIPPED | OUTPATIENT
Start: 2021-02-16

## 2021-02-16 RX ORDER — ERGOCALCIFEROL 1.25 MG/1
50000 CAPSULE ORAL 2 TIMES WEEKLY
Qty: 10 CAPSULE | Refills: 5 | Status: SHIPPED | OUTPATIENT
Start: 2021-02-18

## 2021-02-16 RX ORDER — POTASSIUM CHLORIDE 750 MG/1
10 TABLET, FILM COATED, EXTENDED RELEASE ORAL DAILY
Qty: 30 TABLET | Refills: 5 | Status: SHIPPED | OUTPATIENT
Start: 2021-02-16

## 2021-02-16 RX ORDER — CLINDAMYCIN PHOSPHATE 10 UG/ML
LOTION TOPICAL 2 TIMES DAILY
Qty: 60 ML | Refills: 5 | Status: SHIPPED | OUTPATIENT
Start: 2021-02-16 | End: 2021-11-17

## 2021-02-16 RX ORDER — MONTELUKAST SODIUM 10 MG/1
10 TABLET ORAL NIGHTLY
Qty: 30 TABLET | Refills: 5 | Status: SHIPPED | OUTPATIENT
Start: 2021-02-16 | End: 2021-09-28 | Stop reason: SDUPTHER

## 2021-02-16 RX ORDER — ZOLPIDEM TARTRATE 10 MG/1
10 TABLET ORAL NIGHTLY
Qty: 30 TABLET | Refills: 2 | Status: SHIPPED | OUTPATIENT
Start: 2021-02-16 | End: 2021-06-07 | Stop reason: SDUPTHER

## 2021-02-16 RX ORDER — ASPIRIN 81 MG/1
81 TABLET ORAL DAILY
Qty: 30 TABLET | Refills: 5 | Status: SHIPPED | OUTPATIENT
Start: 2021-02-16

## 2021-02-16 RX ORDER — DICYCLOMINE HYDROCHLORIDE 10 MG/1
10 CAPSULE ORAL 3 TIMES DAILY PRN
Qty: 30 CAPSULE | Refills: 5 | Status: SHIPPED | OUTPATIENT
Start: 2021-02-16 | End: 2021-05-28

## 2021-02-16 RX ORDER — MELOXICAM 15 MG/1
15 TABLET ORAL DAILY
Qty: 30 TABLET | Refills: 5 | Status: SHIPPED | OUTPATIENT
Start: 2021-02-16 | End: 2021-06-24

## 2021-02-16 RX ORDER — FAMOTIDINE 20 MG/1
20 TABLET, FILM COATED ORAL 2 TIMES DAILY
Qty: 60 TABLET | Refills: 5 | Status: SHIPPED | OUTPATIENT
Start: 2021-02-16 | End: 2021-09-28 | Stop reason: SDUPTHER

## 2021-02-16 RX ORDER — METHOCARBAMOL 750 MG/1
750 TABLET, FILM COATED ORAL 3 TIMES DAILY PRN
Qty: 90 TABLET | Refills: 5 | Status: SHIPPED | OUTPATIENT
Start: 2021-02-16

## 2021-02-16 RX ORDER — ALBUTEROL SULFATE 90 UG/1
2 AEROSOL, METERED RESPIRATORY (INHALATION) EVERY 6 HOURS PRN
Qty: 18 G | Refills: 5 | Status: SHIPPED | OUTPATIENT
Start: 2021-02-16

## 2021-03-01 ENCOUNTER — LAB (OUTPATIENT)
Dept: LAB | Facility: OTHER | Age: 36
End: 2021-03-01

## 2021-03-01 DIAGNOSIS — A04.8 H. PYLORI INFECTION: ICD-10-CM

## 2021-03-01 LAB
BASOPHILS # BLD AUTO: 0.06 10*3/MM3 (ref 0–0.2)
BASOPHILS NFR BLD AUTO: 0.8 % (ref 0–1.5)
DEPRECATED RDW RBC AUTO: 42 FL (ref 37–54)
EOSINOPHIL # BLD AUTO: 0.22 10*3/MM3 (ref 0–0.4)
EOSINOPHIL NFR BLD AUTO: 2.9 % (ref 0.3–6.2)
ERYTHROCYTE [DISTWIDTH] IN BLOOD BY AUTOMATED COUNT: 14 % (ref 12.3–15.4)
HCT VFR BLD AUTO: 42.8 % (ref 34–46.6)
HGB BLD-MCNC: 13.6 G/DL (ref 12–15.9)
LYMPHOCYTES # BLD AUTO: 2.92 10*3/MM3 (ref 0.7–3.1)
LYMPHOCYTES NFR BLD AUTO: 38.5 % (ref 19.6–45.3)
MCH RBC QN AUTO: 26.3 PG (ref 26.6–33)
MCHC RBC AUTO-ENTMCNC: 31.8 G/DL (ref 31.5–35.7)
MCV RBC AUTO: 82.8 FL (ref 79–97)
MONOCYTES # BLD AUTO: 0.54 10*3/MM3 (ref 0.1–0.9)
MONOCYTES NFR BLD AUTO: 7.1 % (ref 5–12)
NEUTROPHILS NFR BLD AUTO: 3.85 10*3/MM3 (ref 1.7–7)
NEUTROPHILS NFR BLD AUTO: 50.7 % (ref 42.7–76)
PLATELET # BLD AUTO: 308 10*3/MM3 (ref 140–450)
PMV BLD AUTO: 9.2 FL (ref 6–12)
RBC # BLD AUTO: 5.17 10*6/MM3 (ref 3.77–5.28)
WBC # BLD AUTO: 7.59 10*3/MM3 (ref 3.4–10.8)

## 2021-03-01 PROCEDURE — 86677 HELICOBACTER PYLORI ANTIBODY: CPT | Performed by: NURSE PRACTITIONER

## 2021-03-01 PROCEDURE — 85025 COMPLETE CBC W/AUTO DIFF WBC: CPT | Performed by: NURSE PRACTITIONER

## 2021-03-04 LAB
H PYLORI IGA SER-ACNC: <9 UNITS (ref 0–8.9)
H PYLORI IGG SER IA-ACNC: 1.49 INDEX VALUE (ref 0–0.79)

## 2021-03-09 NOTE — PROGRESS NOTES
Levels for h pylori have not changed, since IgA was not positive I am not sure if previous or acute infection. So let's see GI on Thursday and see if he thinks this would be a good idea for him to decide to retreat for possible hpylori vs infectious gastritis/diverticultitis etc. He will most likely want a scope. Thanks. Continue probiotic or yogurt in the meantime.

## 2021-04-22 ENCOUNTER — OFFICE VISIT (OUTPATIENT)
Dept: GASTROENTEROLOGY | Facility: CLINIC | Age: 36
End: 2021-04-22

## 2021-04-22 VITALS
SYSTOLIC BLOOD PRESSURE: 145 MMHG | HEART RATE: 88 BPM | DIASTOLIC BLOOD PRESSURE: 84 MMHG | HEIGHT: 63 IN | BODY MASS INDEX: 38.27 KG/M2 | WEIGHT: 216 LBS

## 2021-04-22 DIAGNOSIS — R10.13 EPIGASTRIC PAIN: ICD-10-CM

## 2021-04-22 DIAGNOSIS — R19.4 CHANGE IN BOWEL HABITS: Primary | ICD-10-CM

## 2021-04-22 PROCEDURE — 99214 OFFICE O/P EST MOD 30 MIN: CPT | Performed by: INTERNAL MEDICINE

## 2021-04-22 RX ORDER — SYRINGE WITH NEEDLE, 1 ML 25GX5/8"
SYRINGE, EMPTY DISPOSABLE MISCELLANEOUS
COMMUNITY
Start: 2021-03-24

## 2021-04-22 RX ORDER — DEXTROSE AND SODIUM CHLORIDE 5; .45 G/100ML; G/100ML
30 INJECTION, SOLUTION INTRAVENOUS CONTINUOUS PRN
Status: CANCELLED | OUTPATIENT
Start: 2021-07-06

## 2021-04-22 NOTE — PATIENT INSTRUCTIONS
Fatty Liver Disease    Fatty liver disease occurs when too much fat has built up in your liver cells. Fatty liver disease is also called hepatic steatosis or steatohepatitis. The liver removes harmful substances from your bloodstream and produces fluids that your body needs. It also helps your body use and store energy from the food you eat.  In many cases, fatty liver disease does not cause symptoms or problems. It is often diagnosed when tests are being done for other reasons. However, over time, fatty liver can cause inflammation that may lead to more serious liver problems, such as scarring of the liver (cirrhosis) and liver failure.  Fatty liver is associated with insulin resistance, increased body fat, high blood pressure (hypertension), and high cholesterol. These are features of metabolic syndrome and increase your risk for stroke, diabetes, and heart disease.  What are the causes?  This condition may be caused by:  · Drinking too much alcohol.  · Poor nutrition.  · Obesity.  · Cushing's syndrome.  · Diabetes.  · High cholesterol.  · Certain drugs.  · Poisons.  · Some viral infections.  · Pregnancy.  What increases the risk?  You are more likely to develop this condition if you:  · Abuse alcohol.  · Are overweight.  · Have diabetes.  · Have hepatitis.  · Have a high triglyceride level.  · Are pregnant.  What are the signs or symptoms?  Fatty liver disease often does not cause symptoms. If symptoms do develop, they can include:  · Fatigue.  · Weakness.  · Weight loss.  · Confusion.  · Abdominal pain.  · Nausea and vomiting.  · Yellowing of your skin and the white parts of your eyes (jaundice).  · Itchy skin.  How is this diagnosed?  This condition may be diagnosed by:  · A physical exam and medical history.  · Blood tests.  · Imaging tests, such as an ultrasound, CT scan, or MRI.  · A liver biopsy. A small sample of liver tissue is removed using a needle. The sample is then looked at under a microscope.  How  is this treated?  Fatty liver disease is often caused by other health conditions. Treatment for fatty liver may involve medicines and lifestyle changes to manage conditions such as:  · Alcoholism.  · High cholesterol.  · Diabetes.  · Being overweight or obese.  Follow these instructions at home:    · Do not drink alcohol. If you have trouble quitting, ask your health care provider how to safely quit with the help of medicine or a supervised program. This is important to keep your condition from getting worse.  · Eat a healthy diet as told by your health care provider. Ask your health care provider about working with a diet and nutrition specialist (dietitian) to develop an eating plan.  · Exercise regularly. This can help you lose weight and control your cholesterol and diabetes. Talk to your health care provider about an exercise plan and which activities are best for you.  · Take over-the-counter and prescription medicines only as told by your health care provider.  · Keep all follow-up visits as told by your health care provider. This is important.  Contact a health care provider if:  You have trouble controlling your:  · Blood sugar. This is especially important if you have diabetes.  · Cholesterol.  · Drinking of alcohol.  Get help right away if:  · You have abdominal pain.  · You have jaundice.  · You have nausea and vomiting.  · You vomit blood or material that looks like coffee grounds.  · You have stools that are black, tar-like, or bloody.  Summary  · Fatty liver disease develops when too much fat builds up in the cells of your liver.  · Fatty liver disease often causes no symptoms or problems. However, over time, fatty liver can cause inflammation that may lead to more serious liver problems, such as scarring of the liver (cirrhosis).  · You are more likely to develop this condition if you abuse alcohol, are pregnant, are overweight, have diabetes, have hepatitis, or have high triglyceride  levels.  · Contact your health care provider if you have trouble controlling your weight, blood sugar, cholesterol, or drinking of alcohol.  This information is not intended to replace advice given to you by your health care provider. Make sure you discuss any questions you have with your health care provider.  Document Revised: 11/30/2018 Document Reviewed: 09/26/2018  [employer] Patient Education © 2021 [employer]

## 2021-04-22 NOTE — PROGRESS NOTES
[facility]      Chief Complaint:   Chief Complaint   Patient presents with   • Fatty Liver     Referred Per ABHISHEK Trivedi        Subjective     HPI:   Patient with possible H. pylori has been treated with some antibiotics but has not been able to take the full course of antibiotic.  Patient also with alpha gal but is continuing to eat red meat.  Patient does have diarrhea but has been off red meat and continues to have diarrhea even when off of red meat.  Patient has abdominal pain and difficulty eating with early satiety.    Plan; schedule patient her EGD and colonoscopy.  Discussed with patient importance of staying away from red meat because of the alpha gal.  Patient does have an EpiPen    Past Medical History:   Past Medical History:   Diagnosis Date   • Abdominal pain    • Abnormal Pap smear of cervix    • Acute bronchitis    • Acute left otitis media    • Ankle pain    • Anxiety    • Asthma    • Attention deficit hyperactivity disorder    • Bipolar disorder (CMS/HCC)    • Candidiasis    • Candidiasis of vagina    • Depression    • Diabetes mellitus (CMS/HCC)    • Diarrhea    • Dizziness    • Dysuria    • Elbow joint pain     elbow joint - painful on movement   • Elbow joint pain    • Elevated blood pressure    • Encounter for long-term (current) use of medications     Long-term (current) use of other medications    • Epigastric pain    • Excessive thirst    • Fall    • Fatigue    • Feeling tired     tired all the time   • Flank pain    • Gastroesophageal reflux disease    • High risk sexual behavior    • History of malignant neoplasm of cervix    • Hordeolum internum right upper eyelid    • HPV (human papilloma virus) infection    • Hx of blood clots    • Hypokalemia    • Increased frequency of urination    • Infection of skin and subcutaneous tissue    • Irritable bowel syndrome    • Knee pain, left    • Low back pain    • Muscle weakness    • Nausea and vomiting    • Pain in  limb    • Pain in wrist    • Peripheral arterial disease (CMS/HCC)    • Postartificial menopausal syndrome    • Postmenopausal state    • Serous otitis media    • Skin lesion    • Smokes tobacco daily    • Upper respiratory infection    • Urinary tract infection    • Vaginitis        Past Surgical History:  Past Surgical History:   Procedure Laterality Date   • APPENDECTOMY     • INJECTION OF MEDICATION  04/17/2015    depo medrol 80mg   • LEG SURGERY     • OOPHORECTOMY  07/22/2015    bilatral, for pain and recurrent ovarian cysts   • ORIF ULNA/RADIUS FRACTURES      treat radius/ulna fracutre-2; no pins, fracture L fa   • PAP SMEAR  09/2012   • TOTAL ABDOMINAL HYSTERECTOMY      w bso   • TUBAL ABDOMINAL LIGATION         Family History:  Family History   Problem Relation Age of Onset   • ADD / ADHD Daughter    • Lung cancer Maternal Grandmother    • Crohn's disease Maternal Grandmother    • Cancer Paternal Grandmother    • Uterine cancer Paternal Grandmother    • Colon cancer Paternal Grandmother    • Diabetes Other    • Liver cancer Other    • Breast cancer Mother    • Ovarian cancer Mother    • Crohn's disease Mother    • Birth defects Brother    • Stroke Paternal Grandfather    • Endometrial cancer Neg Hx        Social History:   reports that she quit smoking about 7 years ago. Her smoking use included cigarettes. She smoked 2.00 packs per day. She has never used smokeless tobacco. She reports that she does not drink alcohol and does not use drugs.    Medications:   Prior to Admission medications    Medication Sig Start Date End Date Taking? Authorizing Provider   albuterol (ACCUNEB) 1.25 MG/3ML nebulizer solution Take 3 mL by nebulization Every 6 (Six) Hours As Needed for Wheezing. 2/4/20  Yes Gina Whittaker APRN   albuterol sulfate  (90 Base) MCG/ACT inhaler Inhale 2 puffs Every 6 (Six) Hours As Needed for Wheezing or Shortness of Air. 2/16/21  Yes Gina Whittaker APRN   aspirin (aspirin) 81 MG EC  tablet Take 1 tablet by mouth Daily. For PAD 2/16/21  Yes Gina Whittaker APRN   atorvastatin (LIPITOR) 20 MG tablet Take 1 tablet by mouth Every Night. 2/16/21  Yes Gina Whittaker APRN   Blood Glucose Monitoring Suppl (ONE TOUCH ULTRA MINI) W/DEVICE kit  11/15/16  Yes Provider, MD Priyank   cilostazol (PLETAL) 100 MG tablet Take 1 tablet by mouth Daily. 2/16/21  Yes Gina Whittaker APRN   citalopram (CeleXA) 20 MG tablet Take 1 tablet by mouth Daily. For anxiety 2/16/21  Yes Gina Whittaker APRN   clindamycin (CLEOCIN T) 1 % lotion Apply  topically to the appropriate area as directed 2 (Two) Times a Day. For HS. 2/16/21  Yes Gina Whittaker APRN   cyanocobalamin 1000 MCG/ML injection Inject 1 mL into the appropriate muscle as directed by prescriber Every 28 (Twenty-Eight) Days. 2/16/21  Yes Gina Whittaker APRN   dicyclomine (Bentyl) 10 MG capsule Take 1 capsule by mouth 3 (Three) Times a Day As Needed (diarrhea/abd cramping). 2/16/21  Yes Gina Whittaker APRN   famotidine (Pepcid) 20 MG tablet Take 1 tablet by mouth 2 (Two) Times a Day. 2/16/21  Yes Gina Whittaker APRN   fluticasone (FLONASE) 50 MCG/ACT nasal spray 2 sprays into the nostril(s) as directed by provider Daily. For allergies 2/16/21  Yes Gina Whittaker APRN   glucose blood test strip Check prn for hypoglycemia (bid prn) 11/6/20  Yes Gina Whittaker APRN   glucose monitor monitoring kit 1 each Daily. 9/16/19  Yes Gina Whittaker APRN   Lancets (OneTouch Delica Plus Vrybyx60S) misc 1 EACH 2 (TWO) TIMES A DAY. 12/8/20  Yes Gina Whittaker APRN   loratadine (CLARITIN) 10 MG tablet Take 1 tablet by mouth Daily. For allergies 2/16/21  Yes Gina Whittaker APRN   meloxicam (MOBIC) 15 MG tablet Take 1 tablet by mouth Daily. For arthritis 2/16/21  Yes Gina Whittaker APRN   methocarbamol (ROBAXIN) 750 MG tablet Take 1 tablet by mouth 3 (Three) Times a Day As Needed for Muscle Spasms. 2/16/21  Yes  "Gina Whittaker APRN   montelukast (Singulair) 10 MG tablet Take 1 tablet by mouth Every Night. For allergies 2/16/21  Yes Gina Whittaker APRN   potassium chloride 10 MEQ CR tablet Take 1 tablet by mouth Daily. 2/16/21  Yes Gina Whittaker APRN   spironolactone (Aldactone) 25 MG tablet Take 1 tablet by mouth Daily. For swelling/hair growth, stop hydrochlorathiazide 2/16/21  Yes Gina Whittaker APRN   Syringe/Needle, Disp, 22G X 1-1/2\" 3 ML misc 1 syringe by Other route Every 30 (Thirty) Days. 2/16/21  Yes Gina Whittaker APRN   vitamin D (ERGOCALCIFEROL) 1.25 MG (61666 UT) capsule capsule Take 1 capsule by mouth 2 (Two) Times a Week. For vit d deficiency 2/18/21  Yes Gina Whittaker APRN   zolpidem (AMBIEN) 10 MG tablet Take 1 tablet by mouth Every Night. For sleep. 2/16/21  Yes Gina Whittaker APRN   B-D 3CC LUER-NOAM SYR 25GX1\" 25G X 1\" 3 ML misc  3/24/21   Provider, MD Priyank   diclofenac (VOLTAREN) 50 MG EC tablet  3/24/21   Provider, MD Priyank   neomycin-polymyxin-hydrocortisone (CORTISPORIN) 3.5-77230-1 otic solution Administer 3 drops into both ears 3 (Three) Times a Day. 4/9/21   Gina Whittaker APRN       Allergies:  Cherry flavor, Penicillins, Tomato, Azithromycin, Bactrim [sulfamethoxazole-trimethoprim], Zoloft [sertraline hcl], Alpha-gal, Codeine, Erythromycin, and Keflex [cephalexin]    ROS:    Review of Systems   Constitutional: Negative for activity change, appetite change, chills, diaphoresis, fatigue, fever and unexpected weight change.   HENT: Negative for sore throat and trouble swallowing.    Respiratory: Negative for shortness of breath.    Gastrointestinal: Positive for abdominal pain and diarrhea. Negative for abdominal distention, anal bleeding, blood in stool, constipation, nausea, rectal pain and vomiting.   Endocrine: Negative for polydipsia, polyphagia and polyuria.   Genitourinary: Negative for difficulty urinating.   Musculoskeletal: Negative " "for arthralgias.   Skin: Negative for pallor.   Allergic/Immunologic: Negative for food allergies.   Neurological: Negative for weakness and light-headedness.   Psychiatric/Behavioral: Negative for behavioral problems.     Objective     Blood pressure 145/84, pulse 88, height 158.8 cm (62.5\"), weight 98 kg (216 lb), not currently breastfeeding.    Physical Exam  Constitutional:       General: She is not in acute distress.     Appearance: She is well-developed. She is not diaphoretic.   HENT:      Head: Normocephalic and atraumatic.   Cardiovascular:      Rate and Rhythm: Normal rate and regular rhythm.      Heart sounds: Normal heart sounds. No murmur heard.   No friction rub. No gallop.    Pulmonary:      Effort: No respiratory distress.      Breath sounds: Normal breath sounds. No wheezing or rales.   Chest:      Chest wall: No tenderness.   Abdominal:      General: Bowel sounds are normal. There is no distension.      Palpations: Abdomen is soft. There is no mass.      Tenderness: There is no abdominal tenderness. There is no guarding or rebound.      Hernia: No hernia is present.   Musculoskeletal:         General: Normal range of motion.   Skin:     General: Skin is warm and dry.      Coloration: Skin is not pale.      Findings: No erythema or rash.   Neurological:      Mental Status: She is alert and oriented to person, place, and time.   Psychiatric:         Behavior: Behavior normal.         Thought Content: Thought content normal.         Judgment: Judgment normal.          Assessment/Plan   Diagnoses and all orders for this visit:    1. Change in bowel habits (Primary)  -     Case Request; Standing  -     dextrose 5 % and sodium chloride 0.45 % infusion  -     Case Request    2. Epigastric pain  -     Case Request; Standing  -     dextrose 5 % and sodium chloride 0.45 % infusion  -     Case Request    Other orders  -     Follow Anesthesia Guidelines / Standing Orders; Future  -     Obtain Informed Consent; " Future  -     Implement Anesthesia Orders Day of Procedure; Standing  -     Obtain Informed Consent; Standing  -     POC Glucose Once; Standing  -     Pregnancy, Urine -; Standing  -     Insert Peripheral IV; Standing        ESOPHAGOGASTRODUODENOSCOPY (N/A), COLONOSCOPY (N/A)     Diagnosis Plan   1. Change in bowel habits  Case Request    dextrose 5 % and sodium chloride 0.45 % infusion    Case Request   2. Epigastric pain  Case Request    dextrose 5 % and sodium chloride 0.45 % infusion    Case Request       Anticipated Surgical Procedure:  Orders Placed This Encounter   Procedures   • Follow Anesthesia Guidelines / Standing Orders     Standing Status:   Future   • Obtain Informed Consent     Standing Status:   Future     Order Specific Question:   Informed Consent Given For     Answer:   egd and colonoscopy       The risks, benefits, and alternatives of this procedure have been discussed with the patient or the responsible party- the patient understands and agrees to proceed.

## 2021-04-23 ENCOUNTER — LAB (OUTPATIENT)
Dept: LAB | Facility: OTHER | Age: 36
End: 2021-04-23

## 2021-04-23 DIAGNOSIS — Z91.09 ENVIRONMENTAL ALLERGIES: Primary | ICD-10-CM

## 2021-04-23 DIAGNOSIS — E88.09 ALPHA GALACTOSIDASE DEFICIENCY: ICD-10-CM

## 2021-04-23 PROCEDURE — 86003 ALLG SPEC IGE CRUDE XTRC EA: CPT | Performed by: NURSE PRACTITIONER

## 2021-04-23 PROCEDURE — 86008 ALLG SPEC IGE RECOMB EA: CPT | Performed by: NURSE PRACTITIONER

## 2021-04-27 ENCOUNTER — OFFICE VISIT (OUTPATIENT)
Dept: FAMILY MEDICINE CLINIC | Facility: CLINIC | Age: 36
End: 2021-04-27

## 2021-04-27 VITALS
OXYGEN SATURATION: 99 % | BODY MASS INDEX: 38.8 KG/M2 | SYSTOLIC BLOOD PRESSURE: 156 MMHG | HEART RATE: 85 BPM | DIASTOLIC BLOOD PRESSURE: 90 MMHG | WEIGHT: 219 LBS | HEIGHT: 63 IN

## 2021-04-27 DIAGNOSIS — L20.89 OTHER ATOPIC DERMATITIS: ICD-10-CM

## 2021-04-27 DIAGNOSIS — H60.503 ACUTE OTITIS EXTERNA OF BOTH EARS, UNSPECIFIED TYPE: ICD-10-CM

## 2021-04-27 DIAGNOSIS — S89.92XA INJURY OF LEFT KNEE, INITIAL ENCOUNTER: Primary | ICD-10-CM

## 2021-04-27 DIAGNOSIS — L02.91 ABSCESS: ICD-10-CM

## 2021-04-27 PROCEDURE — 99214 OFFICE O/P EST MOD 30 MIN: CPT | Performed by: NURSE PRACTITIONER

## 2021-04-27 RX ORDER — TRIAMCINOLONE ACETONIDE 5 MG/G
CREAM TOPICAL 2 TIMES DAILY
Qty: 60 G | Refills: 0 | Status: SHIPPED | OUTPATIENT
Start: 2021-04-27

## 2021-04-27 RX ORDER — OFLOXACIN 3 MG/ML
5 SOLUTION AURICULAR (OTIC) DAILY
Qty: 10 ML | Refills: 1 | Status: SHIPPED | OUTPATIENT
Start: 2021-04-27 | End: 2021-05-04

## 2021-04-27 RX ORDER — DOXYCYCLINE 100 MG/1
100 TABLET ORAL 2 TIMES DAILY
Qty: 28 TABLET | Refills: 0 | Status: SHIPPED | OUTPATIENT
Start: 2021-04-27 | End: 2021-05-11

## 2021-04-29 LAB
BARLEY IGE QN: <0.1 KU/L
BEEF IGE QN: 0.61 KU/L
CABBAGE IGE QN: <0.1 KU/L
CARROT IGE QN: <0.1 KU/L
CHICKEN MEAT IGE QN: <0.1 KU/L
CODFISH IGE QN: <0.1 KU/L
CONV CLASS DESCRIPTION: ABNORMAL
CORN IGE QN: <0.1 KU/L
COW MILK IGE QN: 0.14 KU/L
CRAB IGE QN: <0.1 KU/L
EGG WHITE IGE QN: <0.1 KU/L
GRAPE IGE QN: <0.1 KU/L
GREEN PEPPER IGE QN: <0.1 KU/L
LETTUCE IGE QN: <0.1 KU/L
OAT IGE QN: <0.1 KU/L
ORANGE IGE QN: <0.1 KU/L
PEANUT IGE QN: 0.12 KU/L
PORK IGE QN: 0.4 KU/L
POTATO IGE QN: 0.11 KU/L
RICE IGE QN: 0.1 KU/L
RYE IGE QN: <0.1 KU/L
SHRIMP IGE QN: <0.1 KU/L
SOYBEAN IGE QN: <0.1 KU/L
TOMATO IGE QN: 0.11 KU/L
TUNA IGE QN: <0.1 KU/L
WHEAT IGE QN: <0.1 KU/L
WHITE BEAN IGE QN: <0.1 KU/L

## 2021-04-29 NOTE — PROGRESS NOTES
Will call pt once alpha gal is back.     Food panel improving some and some allergens have gone away. Which is surprising, we will see what is going on with her alpha gal then decide plan of care.

## 2021-04-30 LAB
ALPHA-GAL IGE QN: 1.1 KU/L
BEEF IGE QN: 0.69 KU/L
DEPRECATED BEEF IGE RAST QL: 1
DEPRECATED LAMB IGE RAST QL: ABNORMAL
DEPRECATED PORK IGE RAST QL: 1
LAMB IGE QN: 0.23 KU/L
PORK IGE QN: 0.49 KU/L

## 2021-05-10 DIAGNOSIS — S89.92XA INJURY OF LEFT KNEE, INITIAL ENCOUNTER: Primary | ICD-10-CM

## 2021-05-28 RX ORDER — DICYCLOMINE HYDROCHLORIDE 10 MG/1
10 CAPSULE ORAL 3 TIMES DAILY PRN
Qty: 30 CAPSULE | Refills: 5 | Status: SHIPPED | OUTPATIENT
Start: 2021-05-28 | End: 2021-07-29

## 2021-06-07 ENCOUNTER — OFFICE VISIT (OUTPATIENT)
Dept: FAMILY MEDICINE CLINIC | Facility: CLINIC | Age: 36
End: 2021-06-07

## 2021-06-07 ENCOUNTER — LAB (OUTPATIENT)
Dept: LAB | Facility: OTHER | Age: 36
End: 2021-06-07

## 2021-06-07 VITALS
OXYGEN SATURATION: 98 % | HEIGHT: 63 IN | DIASTOLIC BLOOD PRESSURE: 98 MMHG | SYSTOLIC BLOOD PRESSURE: 152 MMHG | BODY MASS INDEX: 38.27 KG/M2 | HEART RATE: 96 BPM | WEIGHT: 216 LBS

## 2021-06-07 DIAGNOSIS — R21 RASH: ICD-10-CM

## 2021-06-07 DIAGNOSIS — M54.50 CHRONIC BILATERAL LOW BACK PAIN WITHOUT SCIATICA: Primary | ICD-10-CM

## 2021-06-07 DIAGNOSIS — M25.562 CHRONIC PAIN OF LEFT KNEE: ICD-10-CM

## 2021-06-07 DIAGNOSIS — E88.09 ALPHA GALACTOSIDASE DEFICIENCY: ICD-10-CM

## 2021-06-07 DIAGNOSIS — M54.6 CHRONIC BILATERAL THORACIC BACK PAIN: ICD-10-CM

## 2021-06-07 DIAGNOSIS — G89.29 CHRONIC BILATERAL THORACIC BACK PAIN: ICD-10-CM

## 2021-06-07 DIAGNOSIS — G89.29 CHRONIC CERVICAL PAIN: ICD-10-CM

## 2021-06-07 DIAGNOSIS — M54.2 CHRONIC CERVICAL PAIN: ICD-10-CM

## 2021-06-07 DIAGNOSIS — Z90.722: ICD-10-CM

## 2021-06-07 DIAGNOSIS — S61.002A OPEN WOUND OF LEFT THUMB, INITIAL ENCOUNTER: ICD-10-CM

## 2021-06-07 DIAGNOSIS — R23.2 HOT FLASHES: ICD-10-CM

## 2021-06-07 DIAGNOSIS — K64.9 HEMORRHOIDS, UNSPECIFIED HEMORRHOID TYPE: ICD-10-CM

## 2021-06-07 DIAGNOSIS — M79.604 ACUTE LEG PAIN, RIGHT: ICD-10-CM

## 2021-06-07 DIAGNOSIS — Z13.29 SCREENING FOR THYROID DISORDER: ICD-10-CM

## 2021-06-07 DIAGNOSIS — G89.29 CHRONIC PAIN OF LEFT KNEE: ICD-10-CM

## 2021-06-07 DIAGNOSIS — L72.9 CYST OF SKIN: ICD-10-CM

## 2021-06-07 DIAGNOSIS — G89.29 CHRONIC BILATERAL LOW BACK PAIN WITHOUT SCIATICA: Primary | ICD-10-CM

## 2021-06-07 DIAGNOSIS — G47.00 INSOMNIA, UNSPECIFIED TYPE: ICD-10-CM

## 2021-06-07 PROCEDURE — 83525 ASSAY OF INSULIN: CPT | Performed by: NURSE PRACTITIONER

## 2021-06-07 PROCEDURE — 84144 ASSAY OF PROGESTERONE: CPT | Performed by: NURSE PRACTITIONER

## 2021-06-07 PROCEDURE — 83001 ASSAY OF GONADOTROPIN (FSH): CPT | Performed by: NURSE PRACTITIONER

## 2021-06-07 PROCEDURE — 83527 ASSAY OF INSULIN: CPT | Performed by: NURSE PRACTITIONER

## 2021-06-07 PROCEDURE — 84443 ASSAY THYROID STIM HORMONE: CPT | Performed by: NURSE PRACTITIONER

## 2021-06-07 PROCEDURE — 82670 ASSAY OF TOTAL ESTRADIOL: CPT | Performed by: NURSE PRACTITIONER

## 2021-06-07 PROCEDURE — 84439 ASSAY OF FREE THYROXINE: CPT | Performed by: NURSE PRACTITIONER

## 2021-06-07 PROCEDURE — 83002 ASSAY OF GONADOTROPIN (LH): CPT | Performed by: NURSE PRACTITIONER

## 2021-06-07 PROCEDURE — 99214 OFFICE O/P EST MOD 30 MIN: CPT | Performed by: NURSE PRACTITIONER

## 2021-06-07 RX ORDER — ZOLPIDEM TARTRATE 10 MG/1
10 TABLET ORAL NIGHTLY
Qty: 30 TABLET | Refills: 2 | Status: SHIPPED | OUTPATIENT
Start: 2021-06-07 | End: 2021-11-17 | Stop reason: DRUGHIGH

## 2021-06-07 RX ORDER — NYSTATIN 100000 U/G
CREAM TOPICAL 2 TIMES DAILY
Qty: 30 G | Refills: 5 | Status: SHIPPED | OUTPATIENT
Start: 2021-06-07 | End: 2021-11-17

## 2021-06-07 RX ORDER — EPINEPHRINE 0.3 MG/.3ML
0.3 INJECTION SUBCUTANEOUS ONCE
Qty: 2 EACH | Refills: 5 | Status: SHIPPED | OUTPATIENT
Start: 2021-06-07 | End: 2021-06-07

## 2021-06-07 NOTE — PROGRESS NOTES
Chief Complaint  Chief Complaint   Patient presents with   • chiropractor       Veronica HEAD Kurt presents to [facility] via office visit for referral for chiropractic.     SUBJECTIVE        History of Present Illness    Lumbar/thoracic/cervical back pain-chronic uncontrolled  -wants to see chiropractor, states needs referral for insurance to cover them. C/o of upper back pain, in between shoulders and travels down to the lower back.   -POC: [place] [facility] referral placed because pt states she gets more visits covered. Recommended patient start weekly. xrays ordered.     Right lower leg muscular pain/calf pain-acute, worsening/injury  -pt prefers xray for now then maybe mri. Pt states injury and muscle not in pain and painful to walk on. Pt x 2 weeks ago, step down off ladder when painting wrong and thinks she detached the muscle. Area of back of lower part of leg. Doing topicals with no relief. Pain to put weight on.    -POC: xray first. F/u pending results.     Chronic knee pain-left-chronic, uncontrolled  -mri not approved til PT done. Concerned for instability.   -POC: Referral to physical therapy.     Hot flashes/postmenopausal symptoms-acute, uncontrolled. Abnormal dark hair growth on chin.   Hot flashes worse from 10pm to 10am. Sweat all night.   -ovaries removed previously and been on hormone replacement previously.   -POC: tsh/t4 free, hormone levels. Then f/u. Consider aldactone.     Hemorrhoids per pt. Pt states dr barth doesn't fix these, wants referral for these. Will refer to dr chávez     Pt has scattered cysts on legs, wants dr chávez to remove/biospy if needed as well. States painful and discomfort with them.     Pt is taking probiotic centrum mvt.     No appetite-chronic problem but woresning. Will check labs and then f/u accordingly.     Left upper breast rash-acute, improving some with otc.  -has spread, tried all yeast infection meds.   -poc: nystatin  "cream    Insomnia-chronic, controlled with ambien 10mg at hs.  -not working with current pain but working currently.   -POC: refilled ambien x three mtns. Patient understands the risks associated with this controlled medication, including tolerance and addiction.  she also agrees to only obtain this medication from me or my collaborating physician, Dr. Anahi Pan, and not from a another provider, unless that provider is covering for me in my absence.  she also agrees to be compliant in dosing, and not self adjust the dose of medication.  A signed controlled substance agreement is on file, and she has received a controlled substance education sheet at this visit.  she has also signed a consent for treatment with a controlled substance as per [facility] policy. BOBBY was obtained and reviewed today.     Alpha gal deficiency-chronic, improving with diet  -epipen reordered as pt's script is expiring next week.     Left thumb wound-acute, improivng per pt.   Replace duct work   Outside of left thumb wound from duck work-wound healing.   -Poc: continue to monitor at home, keep and abx ointment bid. F/u if needed.     Bobby reviewed 6/27/2021 and appropriate.     Review of Systems    The following portions of the patient's history were reviewed and updated as appropriate: allergies, current medications, past family history, past medical history, past social history, past surgical history and problem list.    OBJECTIVE  Vital Signs:   Vitals:    06/07/21 1440   BP: 152/98   Pulse: 96   SpO2: 98%   Weight: 98 kg (216 lb)   Height: 158.8 cm (62.5\")     Body mass index is 38.88 kg/m².    Physical Exam   Physical Exam  Vitals and nursing note reviewed.   Constitutional:       General: She is not in acute distress.     Appearance: Normal appearance. She is well-developed. She is obese.   HENT:      Head: Normocephalic.      Nose: Nose normal.   Cardiovascular:      Rate and Rhythm: Normal rate and regular rhythm.      " Pulses:           Radial pulses are 2+ on the right side and 2+ on the left side.        Dorsalis pedis pulses are 2+ on the right side and 2+ on the left side.      Heart sounds: Normal heart sounds. No murmur heard.   No friction rub. No gallop.    Pulmonary:      Effort: Pulmonary effort is normal. No respiratory distress.      Breath sounds: Normal breath sounds. No wheezing or rales.   Abdominal:      General: There is no distension.      Palpations: Abdomen is soft.   Musculoskeletal:         General: Tenderness present.      Cervical back: Tenderness present. Pain with movement present. Decreased range of motion (due to pain).      Thoracic back: Tenderness present. No spasms. Decreased range of motion (due to pain).      Lumbar back: Spasms and tenderness present. Decreased range of motion (due to pain).      Left knee: Swelling and bony tenderness present. No deformity, effusion, erythema, ecchymosis, lacerations or crepitus. Decreased range of motion. Tenderness present. Abnormal alignment.      Right lower leg: No edema.      Left lower leg: No edema.   Skin:     General: Skin is warm and dry.   Neurological:      Mental Status: She is alert and oriented to person, place, and time. Mental status is at baseline. She is not disoriented.   Psychiatric:         Attention and Perception: She is attentive.         Mood and Affect: Mood normal.         Speech: Speech normal.         Behavior: Behavior normal. Behavior is cooperative.       Result Review :                Assessment and Plan      Visit Diagnoses:    ICD-10-CM ICD-9-CM   1. Chronic bilateral low back pain without sciatica  M54.5 724.2    G89.29 338.29   2. Chronic bilateral thoracic back pain  M54.6 724.1    G89.29 338.29   3. Chronic cervical pain  M54.2 723.1    G89.29 338.29   4. Acute leg pain, right  M79.604 729.5   5. Alpha galactosidase deficiency  E75.6 272.7   6. Chronic pain of left knee  M25.562 719.46    G89.29 338.29   7. Insomnia,  unspecified type  G47.00 780.52   8. Screening for thyroid disorder  Z13.29 V77.0   9. Hot flashes  R23.2 782.62   10. History of removal of both ovaries  Z90.722 V45.77   11. Rash  R21 782.1   12. Open wound of left thumb, initial encounter  S61.002A 883.0   13. Cyst of skin  L72.9 706.2   14. Hemorrhoids, unspecified hemorrhoid type  K64.9 455.6       Plan of Care:    Please See History of Present Illness(HPI) above for Plan of Care (POC) for individualized diagnoses as well as when to follow up.         Follow Up   No follow-ups on file.  Patient was given instructions and counseling regarding her condition or for health maintenance advice. Please see specific information pulled into the AVS if appropriate.         This document has been electronically signed by ABHISHEK Bowman on June 27, 2021 22:56 CDT      Part of this note may be an electronic transcription/translation of spoken language to printed text using the Dragon Dictation System

## 2021-06-08 LAB
ESTRADIOL SERPL HS-MCNC: 8.9 PG/ML
FSH SERPL-ACNC: 56.5 MIU/ML
LH SERPL-ACNC: 40.3 MIU/ML
PROGEST SERPL-MCNC: 0.14 NG/ML
T4 FREE SERPL-MCNC: 1.01 NG/DL (ref 0.93–1.7)
TSH SERPL DL<=0.05 MIU/L-ACNC: 3.09 UIU/ML (ref 0.27–4.2)

## 2021-06-08 NOTE — PROGRESS NOTES
Let pt know thyroid levels normal.   LSH and FH within postmenopausal status  Progesterone is more in a difference stage so let's get testosteorne levels then we will discuss.  Insulin levels pending too.     Will have those back later this week.

## 2021-06-13 LAB
INSULIN FREE SERPL-ACNC: 10 UU/ML
INSULIN SERPL-ACNC: 10 UU/ML

## 2021-06-14 ENCOUNTER — LAB (OUTPATIENT)
Dept: LAB | Facility: OTHER | Age: 36
End: 2021-06-14

## 2021-06-14 DIAGNOSIS — R23.2 HOT FLASHES: ICD-10-CM

## 2021-06-14 DIAGNOSIS — Z90.722: ICD-10-CM

## 2021-06-14 PROCEDURE — 84403 ASSAY OF TOTAL TESTOSTERONE: CPT | Performed by: NURSE PRACTITIONER

## 2021-06-14 PROCEDURE — 84402 ASSAY OF FREE TESTOSTERONE: CPT | Performed by: NURSE PRACTITIONER

## 2021-06-14 NOTE — PROGRESS NOTES
Insulin levels normal, once she gets testosterone levels back we can address her hormone levels. Sry they didn't have enough blood for that one. It takes about five days to process.

## 2021-06-19 LAB
TESTOST FREE SERPL-MCNC: 2.4 PG/ML (ref 0–4.2)
TESTOST SERPL-MCNC: 20 NG/DL (ref 8–60)

## 2021-06-24 RX ORDER — MIRTAZAPINE 7.5 MG/1
7.5 TABLET, FILM COATED ORAL NIGHTLY
Qty: 30 TABLET | Refills: 5 | OUTPATIENT
Start: 2021-06-24

## 2021-06-25 ENCOUNTER — TELEPHONE (OUTPATIENT)
Dept: FAMILY MEDICINE CLINIC | Facility: CLINIC | Age: 36
End: 2021-06-25

## 2021-06-25 RX ORDER — FLUCONAZOLE 150 MG/1
TABLET ORAL
Qty: 2 TABLET | Refills: 0 | Status: SHIPPED | OUTPATIENT
Start: 2021-06-25 | End: 2021-07-01

## 2021-06-25 NOTE — TELEPHONE ENCOUNTER
----- Message from Bess Torres sent at 6/25/2021 11:27 AM CDT -----  NEEDS A SCRIPT FOR DIFLUCAN FOR YEAST. SHE SAID SHE GOT IT FROM SWIMMING IN A PARR THAT PUT ESSENTIAL OILS IN THERE POOL. MCP

## 2021-06-27 PROBLEM — G89.29 CHRONIC BILATERAL LOW BACK PAIN WITHOUT SCIATICA: Status: ACTIVE | Noted: 2021-06-27

## 2021-06-27 PROBLEM — G89.29 CHRONIC CERVICAL PAIN: Status: ACTIVE | Noted: 2021-06-27

## 2021-06-27 PROBLEM — Z90.722: Status: ACTIVE | Noted: 2021-06-27

## 2021-06-27 PROBLEM — R23.2 HOT FLASHES: Status: ACTIVE | Noted: 2021-06-27

## 2021-06-27 PROBLEM — M54.2 CHRONIC CERVICAL PAIN: Status: ACTIVE | Noted: 2021-06-27

## 2021-06-27 PROBLEM — M54.50 CHRONIC BILATERAL LOW BACK PAIN WITHOUT SCIATICA: Status: ACTIVE | Noted: 2021-06-27

## 2021-06-27 PROBLEM — M79.604 ACUTE LEG PAIN, RIGHT: Status: ACTIVE | Noted: 2021-06-27

## 2021-06-27 PROBLEM — R21 RASH: Status: ACTIVE | Noted: 2021-06-27

## 2021-06-27 PROBLEM — E88.09 ALPHA GALACTOSIDASE DEFICIENCY: Status: ACTIVE | Noted: 2021-06-27

## 2021-08-02 RX ORDER — DICYCLOMINE HYDROCHLORIDE 10 MG/1
10 CAPSULE ORAL 3 TIMES DAILY PRN
Qty: 30 CAPSULE | Refills: 0 | Status: SHIPPED | OUTPATIENT
Start: 2021-08-02 | End: 2021-11-17

## 2021-08-26 ENCOUNTER — LAB (OUTPATIENT)
Dept: LAB | Facility: OTHER | Age: 36
End: 2021-08-26

## 2021-08-26 PROCEDURE — 87635 SARS-COV-2 COVID-19 AMP PRB: CPT | Performed by: PHYSICIAN ASSISTANT

## 2021-09-22 DIAGNOSIS — M25.50 ARTHRALGIA, UNSPECIFIED JOINT: ICD-10-CM

## 2021-09-22 DIAGNOSIS — K21.9 GASTROESOPHAGEAL REFLUX DISEASE WITHOUT ESOPHAGITIS: ICD-10-CM

## 2021-09-22 DIAGNOSIS — J30.1 CHRONIC ALLERGIC RHINITIS DUE TO POLLEN: ICD-10-CM

## 2021-09-23 RX ORDER — MONTELUKAST SODIUM 10 MG/1
10 TABLET ORAL NIGHTLY
Qty: 30 TABLET | Refills: 5 | OUTPATIENT
Start: 2021-09-23

## 2021-09-23 RX ORDER — MELOXICAM 15 MG/1
15 TABLET ORAL DAILY
Qty: 30 TABLET | Refills: 5 | OUTPATIENT
Start: 2021-09-23

## 2021-09-23 RX ORDER — FAMOTIDINE 20 MG/1
20 TABLET, FILM COATED ORAL 2 TIMES DAILY
Qty: 60 TABLET | Refills: 5 | OUTPATIENT
Start: 2021-09-23

## 2021-09-23 NOTE — TELEPHONE ENCOUNTER
Rx Refill Note  Requested Prescriptions     Pending Prescriptions Disp Refills   • famotidine (PEPCID) 20 MG tablet [Pharmacy Med Name: FAMOTIDINE 20 MG TABS 20 Tablet] 60 tablet 5     Sig: TAKE 1 TABLET BY MOUTH 2 (TWO) TIMES A DAY.   • montelukast (SINGULAIR) 10 MG tablet [Pharmacy Med Name: MONTELUKAST SOD 10 MG TAB 10 Tablet] 30 tablet 5     Sig: TAKE 1 TABLET BY MOUTH EVERY NIGHT. FOR ALLERGIES   • meloxicam (MOBIC) 15 MG tablet [Pharmacy Med Name: MELOXICAM 15MG TAB 15 Tablet] 30 tablet 5     Sig: TAKE 1 TABLET BY MOUTH DAILY. FOR ARTHRITIS      Last office visit with prescribing clinician: 6/7/2021      Next office visit with prescribing clinician: Visit date not found            Mele Figueroa LPN  09/23/21, 07:31 CDT

## 2021-09-28 DIAGNOSIS — K21.9 GASTROESOPHAGEAL REFLUX DISEASE WITHOUT ESOPHAGITIS: ICD-10-CM

## 2021-09-28 DIAGNOSIS — J30.1 CHRONIC ALLERGIC RHINITIS DUE TO POLLEN: ICD-10-CM

## 2021-09-28 RX ORDER — MONTELUKAST SODIUM 10 MG/1
10 TABLET ORAL NIGHTLY
Qty: 30 TABLET | Refills: 0 | Status: SHIPPED | OUTPATIENT
Start: 2021-09-28 | End: 2021-11-15

## 2021-09-28 RX ORDER — FAMOTIDINE 20 MG/1
20 TABLET, FILM COATED ORAL 2 TIMES DAILY
Qty: 60 TABLET | Refills: 0 | Status: SHIPPED | OUTPATIENT
Start: 2021-09-28 | End: 2021-11-15

## 2021-11-15 DIAGNOSIS — E16.2 HYPOGLYCEMIA: ICD-10-CM

## 2021-11-15 DIAGNOSIS — K21.9 GASTROESOPHAGEAL REFLUX DISEASE WITHOUT ESOPHAGITIS: ICD-10-CM

## 2021-11-15 DIAGNOSIS — R73.9 HYPERGLYCEMIA: ICD-10-CM

## 2021-11-15 DIAGNOSIS — J30.1 CHRONIC ALLERGIC RHINITIS DUE TO POLLEN: ICD-10-CM

## 2021-11-15 RX ORDER — MONTELUKAST SODIUM 10 MG/1
10 TABLET ORAL NIGHTLY
Qty: 30 TABLET | Refills: 0 | Status: SHIPPED | OUTPATIENT
Start: 2021-11-15

## 2021-11-15 RX ORDER — FAMOTIDINE 20 MG/1
20 TABLET, FILM COATED ORAL 2 TIMES DAILY
Qty: 60 TABLET | Refills: 0 | Status: SHIPPED | OUTPATIENT
Start: 2021-11-15

## 2021-11-17 ENCOUNTER — OFFICE VISIT (OUTPATIENT)
Dept: FAMILY MEDICINE CLINIC | Facility: CLINIC | Age: 36
End: 2021-11-17

## 2021-11-17 ENCOUNTER — LAB (OUTPATIENT)
Dept: LAB | Facility: OTHER | Age: 36
End: 2021-11-17

## 2021-11-17 VITALS
WEIGHT: 218 LBS | DIASTOLIC BLOOD PRESSURE: 84 MMHG | BODY MASS INDEX: 40.12 KG/M2 | HEIGHT: 62 IN | SYSTOLIC BLOOD PRESSURE: 130 MMHG | HEART RATE: 107 BPM | OXYGEN SATURATION: 99 %

## 2021-11-17 DIAGNOSIS — E55.9 VITAMIN D DEFICIENCY: ICD-10-CM

## 2021-11-17 DIAGNOSIS — Z76.89 ENCOUNTER TO ESTABLISH CARE: ICD-10-CM

## 2021-11-17 DIAGNOSIS — Z91.018 MULTIPLE FOOD ALLERGIES: ICD-10-CM

## 2021-11-17 DIAGNOSIS — R73.9 HYPERGLYCEMIA: ICD-10-CM

## 2021-11-17 DIAGNOSIS — E16.2 HYPOGLYCEMIA: ICD-10-CM

## 2021-11-17 DIAGNOSIS — Z91.018 ALLERGY TO ALPHA-GAL: ICD-10-CM

## 2021-11-17 DIAGNOSIS — F32.A ANXIETY AND DEPRESSION: ICD-10-CM

## 2021-11-17 DIAGNOSIS — Z00.00 ANNUAL PHYSICAL EXAM: ICD-10-CM

## 2021-11-17 DIAGNOSIS — G45.9 TIA (TRANSIENT ISCHEMIC ATTACK): ICD-10-CM

## 2021-11-17 DIAGNOSIS — E53.8 B12 DEFICIENCY: ICD-10-CM

## 2021-11-17 DIAGNOSIS — M79.641 RIGHT HAND PAIN: ICD-10-CM

## 2021-11-17 DIAGNOSIS — K21.9 GASTROESOPHAGEAL REFLUX DISEASE WITHOUT ESOPHAGITIS: ICD-10-CM

## 2021-11-17 DIAGNOSIS — Z23 NEED FOR VACCINATION: ICD-10-CM

## 2021-11-17 DIAGNOSIS — I73.9 PAD (PERIPHERAL ARTERY DISEASE) (HCC): ICD-10-CM

## 2021-11-17 DIAGNOSIS — Z00.00 ANNUAL PHYSICAL EXAM: Primary | ICD-10-CM

## 2021-11-17 DIAGNOSIS — E78.5 HYPERLIPIDEMIA, UNSPECIFIED HYPERLIPIDEMIA TYPE: ICD-10-CM

## 2021-11-17 DIAGNOSIS — F31.9 BIPOLAR AFFECTIVE DISORDER, REMISSION STATUS UNSPECIFIED (HCC): ICD-10-CM

## 2021-11-17 DIAGNOSIS — F41.9 ANXIETY AND DEPRESSION: ICD-10-CM

## 2021-11-17 DIAGNOSIS — R56.9 SEIZURES (HCC): ICD-10-CM

## 2021-11-17 DIAGNOSIS — G47.00 INSOMNIA, UNSPECIFIED TYPE: ICD-10-CM

## 2021-11-17 LAB
ALBUMIN SERPL-MCNC: 4.5 G/DL (ref 3.5–5)
ALBUMIN/GLOB SERPL: 1.5 G/DL (ref 1.1–1.8)
ALP SERPL-CCNC: 66 U/L (ref 38–126)
ALT SERPL W P-5'-P-CCNC: 49 U/L
ANION GAP SERPL CALCULATED.3IONS-SCNC: 8 MMOL/L (ref 5–15)
AST SERPL-CCNC: 36 U/L (ref 14–36)
BASOPHILS # BLD AUTO: 0.05 10*3/MM3 (ref 0–0.2)
BASOPHILS NFR BLD AUTO: 0.4 % (ref 0–1.5)
BILIRUB SERPL-MCNC: 0.4 MG/DL (ref 0.2–1.3)
BUN SERPL-MCNC: 11 MG/DL (ref 7–23)
BUN/CREAT SERPL: 13.1 (ref 7–25)
CALCIUM SPEC-SCNC: 10 MG/DL (ref 8.4–10.2)
CHLORIDE SERPL-SCNC: 103 MMOL/L (ref 101–112)
CO2 SERPL-SCNC: 30 MMOL/L (ref 22–30)
CREAT SERPL-MCNC: 0.84 MG/DL (ref 0.52–1.04)
DEPRECATED RDW RBC AUTO: 43.6 FL (ref 37–54)
EOSINOPHIL # BLD AUTO: 0.26 10*3/MM3 (ref 0–0.4)
EOSINOPHIL NFR BLD AUTO: 2.2 % (ref 0.3–6.2)
ERYTHROCYTE [DISTWIDTH] IN BLOOD BY AUTOMATED COUNT: 14.3 % (ref 12.3–15.4)
GFR SERPL CREATININE-BSD FRML MDRD: 78 ML/MIN/1.73 (ref 64–149)
GLOBULIN UR ELPH-MCNC: 3.1 GM/DL (ref 2.3–3.5)
GLUCOSE SERPL-MCNC: 108 MG/DL (ref 70–99)
HCT VFR BLD AUTO: 44.2 % (ref 34–46.6)
HGB BLD-MCNC: 14 G/DL (ref 12–15.9)
LYMPHOCYTES # BLD AUTO: 3.52 10*3/MM3 (ref 0.7–3.1)
LYMPHOCYTES NFR BLD AUTO: 30.1 % (ref 19.6–45.3)
MCH RBC QN AUTO: 26.4 PG (ref 26.6–33)
MCHC RBC AUTO-ENTMCNC: 31.7 G/DL (ref 31.5–35.7)
MCV RBC AUTO: 83.4 FL (ref 79–97)
MONOCYTES # BLD AUTO: 0.91 10*3/MM3 (ref 0.1–0.9)
MONOCYTES NFR BLD AUTO: 7.8 % (ref 5–12)
NEUTROPHILS NFR BLD AUTO: 59.5 % (ref 42.7–76)
NEUTROPHILS NFR BLD AUTO: 6.95 10*3/MM3 (ref 1.7–7)
PLATELET # BLD AUTO: 340 10*3/MM3 (ref 140–450)
PMV BLD AUTO: 9.2 FL (ref 6–12)
POTASSIUM SERPL-SCNC: 4.3 MMOL/L (ref 3.4–5)
PROT SERPL-MCNC: 7.6 G/DL (ref 6.3–8.6)
RBC # BLD AUTO: 5.3 10*6/MM3 (ref 3.77–5.28)
SODIUM SERPL-SCNC: 141 MMOL/L (ref 137–145)
WBC NRBC COR # BLD: 11.69 10*3/MM3 (ref 3.4–10.8)

## 2021-11-17 PROCEDURE — 82607 VITAMIN B-12: CPT | Performed by: NURSE PRACTITIONER

## 2021-11-17 PROCEDURE — 86803 HEPATITIS C AB TEST: CPT | Performed by: NURSE PRACTITIONER

## 2021-11-17 PROCEDURE — 80061 LIPID PANEL: CPT | Performed by: NURSE PRACTITIONER

## 2021-11-17 PROCEDURE — 80050 GENERAL HEALTH PANEL: CPT | Performed by: NURSE PRACTITIONER

## 2021-11-17 PROCEDURE — 90471 IMMUNIZATION ADMIN: CPT | Performed by: NURSE PRACTITIONER

## 2021-11-17 PROCEDURE — 99395 PREV VISIT EST AGE 18-39: CPT | Performed by: NURSE PRACTITIONER

## 2021-11-17 PROCEDURE — 82306 VITAMIN D 25 HYDROXY: CPT | Performed by: NURSE PRACTITIONER

## 2021-11-17 PROCEDURE — 83036 HEMOGLOBIN GLYCOSYLATED A1C: CPT | Performed by: NURSE PRACTITIONER

## 2021-11-17 PROCEDURE — 90686 IIV4 VACC NO PRSV 0.5 ML IM: CPT | Performed by: NURSE PRACTITIONER

## 2021-11-17 PROCEDURE — 3008F BODY MASS INDEX DOCD: CPT | Performed by: NURSE PRACTITIONER

## 2021-11-17 PROCEDURE — 2014F MENTAL STATUS ASSESS: CPT | Performed by: NURSE PRACTITIONER

## 2021-11-17 RX ORDER — ZOLPIDEM TARTRATE 12.5 MG/1
12.5 TABLET, FILM COATED, EXTENDED RELEASE ORAL NIGHTLY PRN
Qty: 30 TABLET | Refills: 0 | Status: SHIPPED | OUTPATIENT
Start: 2021-11-17

## 2021-11-17 RX ORDER — BLOOD SUGAR DIAGNOSTIC
STRIP MISCELLANEOUS
Refills: 5 | OUTPATIENT
Start: 2021-11-17

## 2021-11-17 RX ORDER — CITALOPRAM 40 MG/1
40 TABLET ORAL DAILY
Qty: 30 TABLET | Refills: 5 | Status: SHIPPED | OUTPATIENT
Start: 2021-11-17

## 2021-11-17 NOTE — PROGRESS NOTES
"CC: Establish Care, Hand Pain (bump on right hand), and rapid covid test (Still having covin pneum symptoms and needs to test negative so pt can get covid vaccine)      Subjective:  Veronica Hicks is a 35 y.o. female who presents for     Patient presents to office to establish care today.  She is formally a patient of ABHISHEK Ponce    CMH: GERD, chronic allergies, alpha gal, food allergies, insomnia, vitamin D deficiency, hyperlipidemia, PAD, and B12 deficiency.  She also has episodes of hyper and hypoglycemia. Has petit seizures no treatment for, ADHD, Bipolar disorder, Depression, and Anxiety.    States right hand on mejia surface x 3 weeks. States she was walking past her tub, she fell and hit her hand on the side of the tub. States this spot keeps getting bigger. No associated bruising or erythema.     [facility] further evaluation and treatment.    Is due for a pap. States she will get this scheduled with GYN for.    States had COVID 3 months ago. Had Covid Pneumonia. States still has days where she has wheezing. Uses Nebulizer for. States needs to have negative Covid swab in order to get Covid vaccine.     Does want  Pneumonia and flu shots today.    Feels that her Celexa needs to be increased. Her depression could be better. Does have Suicidal thoughts, but would never act on them. States this is her \"mental coping mechanism.\"    States has had 51 TIAs. Has seen Dr. Bauer for in the past, but not currently seen by a neurologist.       The following portions of the patient's history were reviewed and updated as appropriate: allergies, current medications, past family history, past medical history, past social history, past surgical history and problem list.    Past Medical History:   Diagnosis Date   • Abdominal pain    • Abnormal Pap smear of cervix    • Acute bronchitis    • Acute left otitis media    • Ankle pain    • Anxiety    • Arthritis    • Asthma    • " Attention deficit hyperactivity disorder    • Bipolar disorder ([facility])    • Candidiasis    • Candidiasis of vagina    • Cervical cancer ([facility])    • CHF (congestive heart failure) ([facility])    • Depression    • Diabetes mellitus ([facility])     diet controlled   • Diarrhea    • Dizziness    • Dysuria    • Elbow joint pain     elbow joint - painful on movement   • Elbow joint pain    • Elevated blood pressure    • Encounter for long-term (current) use of medications     Long-term (current) use of other medications    • Epigastric pain    • Excessive thirst    • Fall    • Fatigue    • Feeling tired     tired all the time   • Flank pain    • Gastroesophageal reflux disease    • High risk sexual behavior    • History of malignant neoplasm of cervix    • Hordeolum internum right upper eyelid    • HPV (human papilloma virus) infection    • Hx of blood clots    • Hyperlipidemia    • Hypertension    • Hypokalemia    • Increased frequency of urination    • Infection of skin and subcutaneous tissue    • Irritable bowel syndrome    • Knee pain, left    • Low back pain    • Muscle weakness    • Nausea and vomiting    • Pain in limb    • Pain in wrist    • Peripheral arterial disease ([facility])    • PONV (postoperative nausea and vomiting)    • Postartificial menopausal syndrome    • Postmenopausal state    • Seizure ([facility])    • Serous otitis media    • Skin lesion    • Smokes tobacco daily    • Stroke ([facility])     TIA   • Upper respiratory infection    • Urinary tract infection    • Vaginitis          Current Outpatient Medications:   •  albuterol (ACCUNEB) 1.25 MG/3ML nebulizer solution, Take 3 mL by nebulization Every 6 (Six) Hours As Needed for Wheezing., Disp: 30 vial, Rfl: 5  •  albuterol sulfate  (90 Base) MCG/ACT inhaler, Inhale 2 puffs Every 6 (Six) Hours As Needed for Wheezing or Shortness of Air., Disp: 18 g, Rfl: 5  •  aspirin (aspirin) 81 MG EC tablet, Take 1 tablet by mouth Daily. For PAD, Disp: 30 tablet, Rfl: 5  •  atorvastatin  "(LIPITOR) 20 MG tablet, Take 1 tablet by mouth Every Night., Disp: 30 tablet, Rfl: 5  •  B-D 3CC LUER-NOAM SYR 25GX1\" 25G X 1\" 3 ML misc, , Disp: , Rfl:   •  Blood Glucose Monitoring Suppl (ONE TOUCH ULTRA MINI) W/DEVICE kit, , Disp: , Rfl: 0  •  cilostazol (PLETAL) 100 MG tablet, Take 1 tablet by mouth Daily., Disp: 30 tablet, Rfl: 5  •  cyanocobalamin 1000 MCG/ML injection, Inject 1 mL into the appropriate muscle as directed by prescriber Every 28 (Twenty-Eight) Days., Disp: 1 mL, Rfl: 5  •  diclofenac (VOLTAREN) 50 MG EC tablet, TAKE 1 TABLET BY MOUTH 2 (TWO) TIMES A DAY. (Patient taking differently: Take 50 mg by mouth 2 (Two) Times a Day.), Disp: 60 tablet, Rfl: 5  •  famotidine (PEPCID) 20 MG tablet, TAKE 1 TABLET BY MOUTH 2 (TWO) TIMES A DAY., Disp: 60 tablet, Rfl: 0  •  fluticasone (FLONASE) 50 MCG/ACT nasal spray, 2 sprays into the nostril(s) as directed by provider Daily. For allergies, Disp: 16 g, Rfl: 5  •  glucose monitor monitoring kit, 1 each Daily., Disp: 1 each, Rfl: 0  •  Lancets (OneTouch Delica Plus [id number]) misc, 1 EACH 2 (TWO) TIMES A DAY., Disp: 100 each, Rfl: 5  •  loratadine (CLARITIN) 10 MG tablet, Take 1 tablet by mouth Daily. For allergies, Disp: 30 tablet, Rfl: 2  •  methocarbamol (ROBAXIN) 750 MG tablet, Take 1 tablet by mouth 3 (Three) Times a Day As Needed for Muscle Spasms., Disp: 90 tablet, Rfl: 5  •  montelukast (SINGULAIR) 10 MG tablet, TAKE 1 TABLET BY MOUTH EVERY NIGHT. FOR ALLERGIES, Disp: 30 tablet, Rfl: 0  •  potassium chloride 10 MEQ CR tablet, Take 1 tablet by mouth Daily., Disp: 30 tablet, Rfl: 5  •  spironolactone (Aldactone) 25 MG tablet, Take 1 tablet by mouth Daily. For swelling/hair growth, stop hydrochlorathiazide, Disp: 30 tablet, Rfl: 5  •  Syringe/Needle, Disp, 22G X 1-1/2\" 3 ML misc, 1 syringe by Other route Every 30 (Thirty) Days., Disp: 1 each, Rfl: 5  •  triamcinolone (KENALOG) 0.5 % cream, Apply  topically to the appropriate area as directed 2 (Two) Times a " "Day. To atopic dermatitis of scalp, apply light layer twice a day prn, Disp: 60 g, Rfl: 0  •  vitamin D (ERGOCALCIFEROL) 1.25 MG (72593 UT) capsule capsule, Take 1 capsule by mouth 2 (Two) Times a Week. For vit d deficiency, Disp: 10 capsule, Rfl: 5  •  citalopram (CeleXA) 40 MG tablet, Take 1 tablet by mouth Daily., Disp: 30 tablet, Rfl: 5  •  glucose blood test strip, Check prn for hypoglycemia (bid prn), Disp: 100 each, Rfl: 5  •  zolpidem CR (Ambien CR) 12.5 MG CR tablet, Take 1 tablet by mouth At Night As Needed for Sleep., Disp: 30 tablet, Rfl: 0  No current facility-administered medications for this visit.    Review of Systems    Review of Systems   Constitutional: Negative.    HENT: Negative.    Eyes: Negative.    Respiratory: Negative.    Cardiovascular: Negative.    Gastrointestinal: Negative.    Endocrine: Negative.    Genitourinary: Negative.    Musculoskeletal: Negative.    Skin:        Lesion on right hand   Allergic/Immunologic: Negative.    Neurological: Positive for seizures.   Hematological: Negative.    Psychiatric/Behavioral: Positive for agitation, confusion, sleep disturbance and suicidal ideas. Negative for decreased concentration and self-injury. The patient is nervous/anxious and is hyperactive.    All other systems reviewed and are negative.      Objective  Vitals:    11/17/21 1419   BP: 130/84   Pulse: 107   SpO2: 99%   Weight: 98.9 kg (218 lb)   Height: 157.5 cm (62\")     Body mass index is 39.87 kg/m².    Physical Exam    Physical Exam  Vitals and nursing note reviewed.   Constitutional:       General: She is not in acute distress.     Appearance: Normal appearance. She is well-developed. She is not ill-appearing, toxic-appearing or diaphoretic.   HENT:      Head: Normocephalic and atraumatic.      Right Ear: Tympanic membrane, ear canal and external ear normal. There is no impacted cerumen.      Left Ear: Tympanic membrane, ear canal and external ear normal. There is no impacted " cerumen.   Eyes:      General: No scleral icterus.        Right eye: No discharge.         Left eye: No discharge.      Extraocular Movements: Extraocular movements intact.      Conjunctiva/sclera: Conjunctivae normal.   Neck:      Vascular: No carotid bruit.   Cardiovascular:      Rate and Rhythm: Normal rate and regular rhythm.      Pulses: Normal pulses.      Heart sounds: Normal heart sounds. No murmur heard.  No friction rub. No gallop.    Pulmonary:      Effort: Pulmonary effort is normal. No respiratory distress.      Breath sounds: Normal breath sounds. No stridor. No wheezing, rhonchi or rales.   Chest:      Chest wall: No tenderness.   Abdominal:      General: Bowel sounds are normal. There is no distension.      Palpations: Abdomen is soft. There is no mass.      Tenderness: There is no abdominal tenderness. There is no guarding or rebound.      Hernia: No hernia is present.   Musculoskeletal:      Cervical back: Normal range of motion and neck supple. No rigidity or tenderness. No muscular tenderness.      Right lower leg: No edema.      Left lower leg: No edema.      Comments: Rt hand with some soft tissue swelling noted.    Lymphadenopathy:      Cervical: No cervical adenopathy.   Skin:     General: Skin is warm and dry.      Capillary Refill: Capillary refill takes less than 2 seconds.      Coloration: Skin is not jaundiced or pale.      Findings: No bruising, erythema, lesion or rash.   Neurological:      General: No focal deficit present.      Mental Status: She is alert and oriented to person, place, and time. Mental status is at baseline.   Psychiatric:         Attention and Perception: Attention and perception normal.         Mood and Affect: Mood normal.         Speech: Speech normal.         Behavior: Behavior normal.         Thought Content: Thought content normal.         Cognition and Memory: Cognition normal.         Judgment: Judgment normal.      Comments: Talkative, answers questions  appropriately, well kempt, good eye contact.             Diagnoses and all orders for this visit:    1. Annual physical exam (Primary)  -     CBC w AUTO Differential; Future  -     Comprehensive metabolic panel; Future  -     TSH  -     Lipid Panel With LDL/HDL Ratio; Future  -     Hemoglobin A1c  -     Hepatitis C antibody; Future    2. Encounter to establish care    3. Hypoglycemia  -     glucose blood test strip; Check prn for hypoglycemia (bid prn)  Dispense: 100 each; Refill: 5    4. Hyperglycemia  -     glucose blood test strip; Check prn for hypoglycemia (bid prn)  Dispense: 100 each; Refill: 5    5. Gastroesophageal reflux disease without esophagitis    6. Insomnia, unspecified type  -     zolpidem CR (Ambien CR) 12.5 MG CR tablet; Take 1 tablet by mouth At Night As Needed for Sleep.  Dispense: 30 tablet; Refill: 0    7. Vitamin D deficiency  -     Vitamin D 25 Hydroxy; Future    8. Hyperlipidemia, unspecified hyperlipidemia type    9. PAD (peripheral artery disease) ([facility])  -     Ambulatory Referral to Vascular Surgery    10. B12 deficiency  -     Vitamin B12; Future    11. Allergy to alpha-gal    12. Multiple food allergies    13. Bipolar affective disorder, remission status unspecified ([facility])  -     citalopram (CeleXA) 40 MG tablet; Take 1 tablet by mouth Daily.  Dispense: 30 tablet; Refill: 5    14. Seizures ([facility])  -     Ambulatory Referral to Neurology    15. Anxiety and depression  -     citalopram (CeleXA) 40 MG tablet; Take 1 tablet by mouth Daily.  Dispense: 30 tablet; Refill: 5    16. Need for vaccination  -     pneumococcal polysaccharide 23-valent (PNEUMOVAX-23) vaccine 0.5 mL  -     FluLaval/Fluarix/Fluzone >6 Months    17. TIA (transient ischemic attack)  -     Ambulatory Referral to Neurology    18. Right hand pain  -     XR Hand 3+ View Right; Future    Patient establish care today.  Annual physical exam performed we will check routine labs with a CBC, CMP, TSH, lipids, A1c, vitamin D level,  vitamin B12 level and a hepatitis C antibody.  Patient is fasting today and will get these drawn on her way out today.  We will notify her of these results once they are available.  Crease patient's Ambien to 12.5 mg 1 p.o. every night as needed for sleep as she is having more sleep difficulties.  For the PAD will refer patient to the [facility] for further evaluation and possible treatment.  For the bipolar disorder and anxiety/depression we will increase patient's Celexa to 40 mg 1 p.o. daily.  For the seizures and TIAs will refer patient to neurology for further evaluation and treatment.  For the right hand pain we will obtain x-rays today on patient's way out.  We will notify her of those results once they are available.  Patient given pneumonia shot and flu shot today while in office.  She tolerated well without complications.  Did advise patient that she did not need to have a negative Covid test in order to get the COVID-19 vaccine.  Patient states she will go ahead and get the COVID-19 vaccine scheduled.  She is going to schedule her Pap with a GYN.  Patient to follow-up in 3 months or sooner if needed for follow-up on her Ambien.  Answered all questions.  Patient verbalized understanding of plan of care.    Patient understands the risks associated with this controlled medication, including tolerance and addiction.  she also agrees to only obtain this medication from me, and not from a another provider, unless that provider is covering for me in my absence.  she also agrees to be compliant in dosing, and not self adjust the dose of medication.  A signed controlled substance agreement is on file, and she has received a controlled substance education sheet at this a previous visit.  she has also signed a consent for treatment with a controlled substance as per [facility] policy. BOBBY was obtained.              This document has been electronically signed by ABHISHEK Stockton on  November 17, 2021 15:18 CST

## 2021-11-18 LAB
25(OH)D3 SERPL-MCNC: 26.9 NG/ML (ref 30–100)
CHOLEST SERPL-MCNC: 191 MG/DL (ref 0–200)
HBA1C MFR BLD: 5.46 % (ref 4.8–5.6)
HCV AB SER DONR QL: NORMAL
HDLC SERPL-MCNC: 48 MG/DL (ref 40–60)
LDLC SERPL CALC-MCNC: 101 MG/DL (ref 0–100)
LDLC/HDLC SERPL: 1.96 {RATIO}
TRIGL SERPL-MCNC: 245 MG/DL (ref 0–150)
TSH SERPL DL<=0.05 MIU/L-ACNC: 1.84 UIU/ML (ref 0.27–4.2)
VIT B12 BLD-MCNC: 388 PG/ML (ref 211–946)
VLDLC SERPL-MCNC: 42 MG/DL (ref 5–40)

## 2021-11-18 RX ORDER — ROSUVASTATIN CALCIUM 5 MG/1
5 TABLET, COATED ORAL NIGHTLY
Qty: 30 TABLET | Refills: 5 | Status: SHIPPED | OUTPATIENT
Start: 2021-11-18

## 2021-11-22 DIAGNOSIS — E53.8 VITAMIN B12 DEFICIENCY: ICD-10-CM

## 2021-11-23 RX ORDER — SYRINGE W-NEEDLE,DISPOSAB,3 ML 25GX1"
SYRINGE, EMPTY DISPOSABLE MISCELLANEOUS
Qty: 1 EACH | Refills: 5 | OUTPATIENT
Start: 2021-11-23

## 2021-11-23 NOTE — TELEPHONE ENCOUNTER
"Rx Refill Note  Requested Prescriptions     Pending Prescriptions Disp Refills   • MONOJECT 3CC SYR 25GX1\" 25G X 1\" 3 ML misc [Pharmacy Med Name: MONOJECT 3 ML SYRN 25GX1\" 25G X 1\" Miscellaneous] 1 each 5     Sig: USE WITH B12 AS DIRECTED (THIRTY) DAYS.      Last office visit with prescribing clinician: 6/7/2021      Next office visit with prescribing clinician: Visit date not found            Mele Figueroa LPN  11/23/21, 07:31 CST    "

## 2021-12-17 ENCOUNTER — PATIENT ROUNDING (BHMG ONLY) (OUTPATIENT)
Dept: FAMILY MEDICINE CLINIC | Facility: CLINIC | Age: 36
End: 2021-12-17

## 2021-12-17 NOTE — PROGRESS NOTES
"December 17, 2021    Hello, may I speak with Veronica Hicks?    My name is Popeye, the Clinical Coordinator    I am  with [facility] [place]  [facility] - [address] [place] [id number] [phone].    Before we get started may I verify your date of birth? 1985    I am calling to officially welcome you to our practice and ask about your recent visit. Is this a good time to talk? yes    Tell me about your visit with us. What things went well?  \" Other than I will be switching providers, it was okay. The provider did not listen to me.\"       We're always looking for ways to make our patients' experiences even better. Do you have recommendations on ways we may improve?  yes, \"Provider needs to listen to the patient.\"    Overall were you satisfied with your first visit to our practice? no       I appreciate you taking the time to speak with me today. Is there anything else I can do for you? no      Thank you, and have a great day.    12/17/2021: Notified patient that I would speak with the provider regarding the patient experience and concerns. If there is anything that we can do or another provider we are happy to assist in getting appt setup and are here to help the patient. Patient verbalized understanding, and Thanked me for my time.   "

## 2021-12-22 DIAGNOSIS — E53.8 VITAMIN B12 DEFICIENCY: ICD-10-CM

## 2021-12-22 RX ORDER — SYRINGE W-NEEDLE,DISPOSAB,3 ML 25GX1"
SYRINGE, EMPTY DISPOSABLE MISCELLANEOUS
Qty: 1 EACH | Refills: 5 | Status: SHIPPED | OUTPATIENT
Start: 2021-12-22